# Patient Record
Sex: FEMALE | Race: WHITE
[De-identification: names, ages, dates, MRNs, and addresses within clinical notes are randomized per-mention and may not be internally consistent; named-entity substitution may affect disease eponyms.]

---

## 2017-01-05 ENCOUNTER — RX ONLY (OUTPATIENT)
Age: 62
Setting detail: RX ONLY
End: 2017-01-05

## 2017-02-04 ENCOUNTER — OFFICE VISIT (OUTPATIENT)
Dept: URGENT CARE | Facility: CLINIC | Age: 62
End: 2017-02-04
Payer: COMMERCIAL

## 2017-02-04 VITALS
SYSTOLIC BLOOD PRESSURE: 130 MMHG | BODY MASS INDEX: 23.8 KG/M2 | OXYGEN SATURATION: 97 % | TEMPERATURE: 98.5 F | HEART RATE: 87 BPM | WEIGHT: 143 LBS | DIASTOLIC BLOOD PRESSURE: 84 MMHG

## 2017-02-04 DIAGNOSIS — R21 SKIN ERUPTION: ICD-10-CM

## 2017-02-04 PROCEDURE — 99202 OFFICE O/P NEW SF 15 MIN: CPT | Performed by: FAMILY MEDICINE

## 2017-02-04 RX ORDER — PREDNISONE 10 MG/1
30 TABLET ORAL EVERY MORNING
Qty: 21 TAB | Refills: 0 | Status: SHIPPED | OUTPATIENT
Start: 2017-02-04 | End: 2017-02-11

## 2017-02-04 RX ORDER — VALACYCLOVIR HYDROCHLORIDE 1 G/1
1000 TABLET, FILM COATED ORAL 3 TIMES DAILY
Qty: 21 TAB | Refills: 0 | Status: SHIPPED | OUTPATIENT
Start: 2017-02-04 | End: 2017-02-11

## 2017-02-04 RX ORDER — PRAMOXINE HYDROCHLORIDE 10 MG/ML
LOTION TOPICAL
Qty: 1 BOTTLE | Refills: 0 | Status: SHIPPED | OUTPATIENT
Start: 2017-02-04 | End: 2022-12-20

## 2017-02-04 RX ORDER — HYDROXYZINE PAMOATE 25 MG/1
CAPSULE ORAL
Qty: 20 CAP | Refills: 1 | Status: SHIPPED | OUTPATIENT
Start: 2017-02-04 | End: 2020-01-11

## 2017-02-04 ASSESSMENT — ENCOUNTER SYMPTOMS
FEVER: 0
SHORTNESS OF BREATH: 0
FOCAL WEAKNESS: 0
DIZZINESS: 0
ORTHOPNEA: 0
CHILLS: 0
HEMOPTYSIS: 0

## 2017-02-04 NOTE — MR AVS SNAPSHOT
Adrienne Morales   2017 4:00 PM   Office Visit   MRN: [id number]    Department:  [facility]   Dept Phone:  [phone]    Description:  Female : 1955   Provider:  Wild Mullen M.D.           Reason for Visit     Rash rash on torso and legs,  itchy x 2 days      Allergies as of 2017     Allergen Noted Reactions    Nkda [No Known Drug Allergy] 10/21/2011         You were diagnosed with     Skin eruption   [[id number]]         Vital Signs     Blood Pressure Pulse Temperature Weight Oxygen Saturation Smoking Status    130/84 mmHg 87 36.9 °C (98.5 °F) 64.864 kg (143 lb) 97% Never Smoker       Basic Information     Date Of Birth Sex Race Ethnicity Preferred Language    1955 Female White Non- English      Problem List              ICD-10-CM Priority Class Noted - Resolved    Preventative health care (Chronic) Z00.00   10/21/2011 - Present    Insomnia G47.00   10/21/2011 - Present    History of endometrial cancer Z85.42   10/21/2011 - Present    History of migraine Z86.69   10/21/2011 - Present    Osteopenia (Chronic) M85.80   10/21/2011 - Present    Perforated tympanic membrane H72.90   2012 - Present    Family history of cardiovascular disease (Chronic) Z82.49   2013 - Present    Dyslipidemia (Chronic) E78.5   2014 - Present      Health Maintenance        Date Due Completion Dates    IMM ZOSTER VACCINE 3/28/2015 ---    IMM INFLUENZA (1) 2016 ---    MAMMOGRAM 2017, 2015, 6/3/2013, 6/3/2013, 6/3/2013, 6/3/2013, 6/3/2013    COLONOSCOPY 2023    IMM DTaP/Tdap/Td Vaccine (2 - Td) 2024            Current Immunizations     Tdap Vaccine 2014      Below and/or attached are the medications your provider expects you to take. Review all of your home medications and newly ordered medications with your provider and/or pharmacist. Follow medication instructions as directed by your provider and/or pharmacist. Please keep  your medication list with you and share with your provider. Update the information when medications are discontinued, doses are changed, or new medications (including over-the-counter products) are added; and carry medication information at all times in the event of emergency situations     Allergies:  NKDA - (reactions not documented)               Medications  Valid as of: February 04, 2017 -  4:37 PM    Generic Name Brand Name Tablet Size Instructions for use    HydrOXYzine Pamoate (Cap) VISTARIL 25 MG 1-2 tabs qhs for itching        Naproxen Sodium   Take  by mouth.        Pramoxine HCl (Lotion) Pramoxine HCl 1 % otc use as directed        PredniSONE (Tab) DELTASONE 10 MG Take 3 Tabs by mouth every morning for 7 days.        SUMAtriptan Succinate (Tab) IMITREX 100 MG Take 1 Tab by mouth Once PRN for Migraine (take one at onset of migraine, maximum 2 per day).        Triamcinolone Acetonide (Cream) KENALOG 0.1 % Apply 1 Application to affected area(s) 2 times a day as needed.        ValACYclovir HCl (Tab) VALTREX 1 GM Take 1 Tab by mouth 3 times a day for 7 days.        .                 Medicines prescribed today were sent to:     [facility] [address] [place] [address], [place] - [address]    [address] [facility] [place] [address]    Phone: [phone] Fax: [phone]    Open 24 Hours?: No      Medication refill instructions:       If your prescription bottle indicates you have medication refills left, it is not necessary to call your provider’s office. Please contact your pharmacy and they will refill your medication.    If your prescription bottle indicates you do not have any refills left, you may request refills at any time through one of the following ways: The online Light[employer] system (except Urgent Care), by calling your provider’s office, or by asking your pharmacy to contact your provider’s office with a refill request. Medication refills are processed only during regular business hours and may not be  available until the next business day. Your provider may request additional information or to have a follow-up visit with you prior to refilling your medication.   *Please Note: Medication refills are assigned a new Rx number when refilled electronically. Your pharmacy may indicate that no refills were authorized even though a new prescription for the same medication is available at the pharmacy. Please request the medicine by name with the pharmacy before contacting your provider for a refill.        Other Notes About Your Plan     10/11/16  right hip, right knee burning sensation, if persists consider MRI lumbar spine, and conduction study right lower extremity                      MyChart Status: Patient Declined

## 2017-02-04 NOTE — PROGRESS NOTES
Subjective:      Adrienne Morales is a 61 y.o. female who presents with Rash    Chief Complaint   Patient presents with   • Rash     rash on torso and legs,  itchy x 2 days        - itchy rash started last night all over back trunk upper legs. Nothing new she can think of. She is very worried she has shingles and wants valtrex.           Rash  Pertinent negatives include no fever or shortness of breath.       Review of Systems   Constitutional: Negative for fever and chills.   Respiratory: Negative for hemoptysis and shortness of breath.    Cardiovascular: Negative for chest pain and orthopnea.   Skin: Positive for rash.   Neurological: Negative for dizziness and focal weakness.          Objective:     /84 mmHg  Pulse 87  Temp(Src) 36.9 °C (98.5 °F)  Wt 64.864 kg (143 lb)  SpO2 97%     Physical Exam   Constitutional: She appears well-developed. No distress.   HENT:   Head: Normocephalic and atraumatic.   Cardiovascular: Regular rhythm.    No murmur heard.  Neurological: She is alert.   Skin: Skin is warm and dry.   Psychiatric: She has a normal mood and affect. Judgment normal.   Nursing note and vitals reviewed.  skin seen: unremarkable except for some excoriation marks             Assessment/Plan:         1. Skin eruption  valacyclovir (VALTREX) 1 GM Tab    hydrOXYzine (VISTARIL) 25 MG Cap    predniSONE (DELTASONE) 10 MG Tab    Pramoxine HCl 1 % Lotion             Dx & d/c instructions discussed w/ patient and/or family members. Follow up w/ Prvt Dr or here in 3-4 days if not getting better, sooner if needed,  ER if worse and UC/PCP unavailable.        Possible side effects (i.e. Rash, GI upset/constipation, sedation, elevation of BP or sugars) of any medications given discussed.

## 2017-07-06 ENCOUNTER — RX ONLY (OUTPATIENT)
Age: 62
Setting detail: RX ONLY
End: 2017-07-06

## 2017-09-29 ENCOUNTER — TELEPHONE (OUTPATIENT)
Dept: MEDICAL GROUP | Facility: MEDICAL CENTER | Age: 62
End: 2017-09-29

## 2017-09-30 NOTE — TELEPHONE ENCOUNTER
Please notify patient that her mammogram at [facility] is negative, repeat one year

## 2017-10-02 ENCOUNTER — TELEPHONE (OUTPATIENT)
Dept: MEDICAL GROUP | Facility: MEDICAL CENTER | Age: 62
End: 2017-10-02

## 2017-10-02 NOTE — TELEPHONE ENCOUNTER
----- Message from Feliciano Olson M.D. sent at 9/29/2017  6:31 PM PDT -----  Please notify patient that her mammogram at [facility] is negative, repeat one year

## 2017-10-05 ENCOUNTER — TELEPHONE (OUTPATIENT)
Dept: MEDICAL GROUP | Facility: MEDICAL CENTER | Age: 62
End: 2017-10-05

## 2017-10-05 DIAGNOSIS — Z00.00 PREVENTATIVE HEALTH CARE: ICD-10-CM

## 2017-10-05 NOTE — TELEPHONE ENCOUNTER
1. Caller Name: Pt                      Call Back Number: [phone] (home)     2. Message: Pt would like lab orders placed for her annual visit. She would like to have Thyroid and Vitamin D included. Please contact pt when ready to      3. Patient approves office to leave a detailed voicemail/MyChart message: yes

## 2017-10-24 ENCOUNTER — TELEPHONE (OUTPATIENT)
Dept: MEDICAL GROUP | Facility: MEDICAL CENTER | Age: 62
End: 2017-10-24

## 2017-10-24 ENCOUNTER — OFFICE VISIT (OUTPATIENT)
Dept: MEDICAL GROUP | Facility: MEDICAL CENTER | Age: 62
End: 2017-10-24
Payer: COMMERCIAL

## 2017-10-24 VITALS
BODY MASS INDEX: 25.16 KG/M2 | TEMPERATURE: 98.3 F | HEIGHT: 65 IN | HEART RATE: 89 BPM | WEIGHT: 151 LBS | DIASTOLIC BLOOD PRESSURE: 64 MMHG | SYSTOLIC BLOOD PRESSURE: 90 MMHG | OXYGEN SATURATION: 97 %

## 2017-10-24 DIAGNOSIS — Z00.00 PREVENTATIVE HEALTH CARE: Chronic | ICD-10-CM

## 2017-10-24 DIAGNOSIS — Z86.69 HISTORY OF MIGRAINE: ICD-10-CM

## 2017-10-24 PROCEDURE — 99396 PREV VISIT EST AGE 40-64: CPT | Performed by: INTERNAL MEDICINE

## 2017-10-24 RX ORDER — SUMATRIPTAN 100 MG/1
100 TABLET, FILM COATED ORAL
Qty: 12 TAB | Refills: 6 | Status: SHIPPED | OUTPATIENT
Start: 2017-10-24 | End: 2018-10-23 | Stop reason: SDUPTHER

## 2017-10-24 ASSESSMENT — ENCOUNTER SYMPTOMS
DEPRESSION: 0
SHORTNESS OF BREATH: 0
INSOMNIA: 0
WEIGHT LOSS: 0
DIARRHEA: 0
COUGH: 0
HEARTBURN: 0
BACK PAIN: 0
DIZZINESS: 0
PALPITATIONS: 0
ABDOMINAL PAIN: 0
BLURRED VISION: 0
NERVOUS/ANXIOUS: 0
DOUBLE VISION: 0

## 2017-10-24 ASSESSMENT — PATIENT HEALTH QUESTIONNAIRE - PHQ9: CLINICAL INTERPRETATION OF PHQ2 SCORE: 0

## 2017-10-24 NOTE — PROGRESS NOTES
Subjective:      Adrienne Morales is a 62 y.o. female who presents with Annual Exam            HPI  Annual exam  Sees eye doctor in [place] this year, contact lenses   No hearing changes  Teeth cleaning twice per year  Sees gyn  2017  meds and allergies reviewed  Has had more migraine headaches usually more in the morning relieved with imitrex and has not had any aura, no emesis, no photophobia, no precipitating factor, will have 6-10 migraine flare ups per month over the last few months, has tried multiple medications previously without benefit for prophylaxis, Imitrex is effective for relieving acute migraine headaches, occasionally she will take Naprosyn as well  FH mother passed away in [place] atrial fibrillation fall and brain bleed and father passed away as well This year  SH , no tobacco, occasional etoh, tries to keep active and walks [phone] steps per days   Denies depression or anxiety   Occasional insomnia      Current Outpatient Prescriptions   Medication Sig Dispense Refill   • Naproxen Sodium (ALEVE PO) Take  by mouth.     • hydrOXYzine (VISTARIL) 25 MG Cap 1-2 tabs qhs for itching 20 Cap 1   • Pramoxine HCl 1 % Lotion otc use as directed 1 Bottle 0   • sumatriptan (IMITREX) 100 MG tablet Take 1 Tab by mouth Once PRN for Migraine (take one at onset of migraine, maximum 2 per day). 9 Tab 5   • triamcinolone acetonide (KENALOG) 0.1 % Cream Apply 1 Application to affected area(s) 2 times a day as needed. 45 g 2     No current facility-administered medications for this visit.      Patient Active Problem List    Diagnosis Date Noted   • Dyslipidemia 09/16/2014   • Family history of cardiovascular disease 11/18/2013   • Perforated tympanic membrane 12/06/2012   • Preventative health care 10/21/2011   • Insomnia 10/21/2011   • History of endometrial cancer 10/21/2011   • History of migraine 10/21/2011   • Osteopenia 10/21/2011     Depression Screening    Little interest or pleasure in doing  things?  0 - not at all  Feeling down, depressed , or hopeless? 0 - not at all  Patient Health Questionnaire Score: 0         Preventative health  12/13/13 colon per [facility] diverticulosis repeat 10 years  9/12/14 tdap  3/15  gyn   10/6/15 dexa LS 0.0,hip -1.8 ;FRAX 9% major,1.0% hip  10/11/16 declines shingles and influenza vaccine  10/19/16 vit d 35 on 2000 units done at [facility]  10/26/16 FIT at [facility] negative  9/29/17 mammogram at [facility] negative repeat one year     Perforated tympanic membrane     Osteopenia  12/10/12 dexa LS -0.9, hip -1.2 per gyn  9/16/14 vit d 28 start 2000 units  10/6/15 dexa LS 0.0,hip -1.8 ;FRAX 9% major,1.0% hip  10/14/15 vit d 32   7/22/16 start 2000 units   10/19/16 vit d 35 on 2000 units done at [facility]     Insomnia uses Ambien for travel     History migraine tried Inderal previously but caused low blood pressure     History endometrial cancer  3/10 [facility] BSO for stage I endometrial cancer   2013 gyn exam  sees twice per year  9/14 sees  gyn     Family history cardiovascular disease  Father CABG and sister heart disease  11/20/13 echo normal LV 60%  12/10/13 CT calcium score 0     Dyslipidemia  11/20/13 echo normal LV 60%  11/14/13 chol 191,trig 113,hdl 61,ldl 107  12/10/13 CT calcium score 0  9/16/14 chol 206,trig 91,hdl 74,ldl 114  10/14/15 chol 215,trig 83,hdl 81,ldl 117  10/19/16 chol 189,trig 127,hdl 61,ldl 103 done at [facility]    Depression Screening    Little interest or pleasure in doing things?  0 - not at all  Feeling down, depressed , or hopeless? 0 - not at all  Patient Health Questionnaire Score: 0       Review of Systems   Constitutional: Negative for weight loss.   Eyes: Negative for blurred vision and double vision.   Respiratory: Negative for cough and shortness of breath.    Cardiovascular: Negative for chest pain and palpitations.   Gastrointestinal: Negative for abdominal pain, diarrhea and heartburn.   Genitourinary: Negative for dysuria.   Musculoskeletal:  "Negative for back pain and joint pain.   Skin: Negative for rash.   Neurological: Negative for dizziness.   Endo/Heme/Allergies: Negative for environmental allergies.   Psychiatric/Behavioral: Negative for depression. The patient is not nervous/anxious and does not have insomnia.           Objective:     BP (!) 90/64   Pulse 89   Temp 36.8 °C (98.3 °F)   Ht 1.651 m (5' 5\")   Wt 68.5 kg (151 lb)   SpO2 97%   BMI 25.13 kg/m²      Physical Exam   Constitutional: She appears well-developed and well-nourished. No distress.   HENT:   Head: Normocephalic and atraumatic.   Eyes: Conjunctivae are normal. Right eye exhibits no discharge. Left eye exhibits no discharge.   Neck: No JVD present. No thyromegaly present.   Cardiovascular: Normal rate and regular rhythm.    No murmur heard.  Pulmonary/Chest: Effort normal. No respiratory distress. She has no wheezes.   Abdominal: She exhibits no distension. There is no tenderness.   Musculoskeletal: She exhibits no edema.   Neurological: She is alert.   Skin: Skin is warm. She is not diaphoretic.   Psychiatric: She has a normal mood and affect. Her behavior is normal.   Nursing note and vitals reviewed.              Assessment/Plan:     Assessment  #! Annual    #2 Osteopenia 10/6/15 dexa LS 0.0,hip -1.8 ;FRAX 9% major,1.0% hip    #3 History migraine tried Inderal previously but caused low blood pressure, more recent migraine flareups 6-10 per month question related to stress from parents passing away from having to handle details financially, denies depression, good social support with      #4 History endometrial cancer 3/10 [facility] BSO for stage I endometrial cancer followed by gynecology , no recurrence, saw gynecology earlier this year, no records      #5 Dyslipidemia diet-controlled no medication 10/19/16 chol 189,trig 127,hdl 61,ldl 103 done at [facility]    #6 Preventative health  12/13/13 colon per [facility] diverticulosis repeat 10 years  9/12/14 tdap  3/15  " gyn   10/6/15 dexa LS 0.0,hip -1.8 ;FRAX 9% major,1.0% hip  10/11/16 declines shingles and influenza vaccine  10/19/16 vit d 35 on 2000 units done at [facility]  10/26/16 FIT at [facility] negative  9/29/17 mammogram at [facility] negative repeat one year        Plan  #! dexa will get at [facility]     #2 had labs at [facility], will call for those results And notify her    #3 follow up with dentist for migraines, declines meds for prophylaxis and declines botox , recommend consider acupuncture, massage therapy for migraines, she will consider, possible stress component from parents passing away this year, no depression    #4 had mammogram earlier this year, repeat next year, follow-up gynecology    #5 next colonoscopy 2023    #6 recommend flu vaccine and shingles vaccine at pharmacy    #7 lifestyle modification discussed    #8 declines psychotherapy    #9 nutrition, exercise discussed, minimize alcohol, no tobacco    #10 follow-up one year, sooner if migraines do not improve

## 2017-10-25 NOTE — TELEPHONE ENCOUNTER
I called patient and left a message, please notify her that we received her blood test results from [facility]  (1) Her cholesterol is 205, good cholesterol or HDL is 66 (goal is above 40), her bad cholesterol is 121 (goal is less than 100), her cholesterol did increase slightly from last year, no medications are necessary, have her continue to work on her exercise and nutrition program  (2) her blood sugar is normal at 98, her 3 month average blood sugar percent is normal at 5.4%  (3) her liver function, kidney function, thyroid tests are normal  (4) her vitamin D level is normal at 37  (5) we can repeat her blood test in one year; notify me if she has any questions

## 2017-10-25 NOTE — TELEPHONE ENCOUNTER
Tried calling pt, unable to leave a message.     Records will be faxed to [phone] within 5-10 business days.

## 2017-11-16 ENCOUNTER — TELEPHONE (OUTPATIENT)
Dept: MEDICAL GROUP | Facility: MEDICAL CENTER | Age: 62
End: 2017-11-16

## 2017-11-17 NOTE — TELEPHONE ENCOUNTER
Please notify patient that her bone density test done at [facility] shows:  (1) normal bone strength of her spine  (2) slightly decreased bone strength of her hip, not osteoporosis, but slightly decreased from 2015  (3) no medications are necessary  (4) continue vitamin D supplementation 2000 units daily, continue regular weightbearing exercise  (5) we will repeat her bone density test in 2 years

## 2017-12-15 ENCOUNTER — TELEPHONE (OUTPATIENT)
Dept: MEDICAL GROUP | Facility: MEDICAL CENTER | Age: 62
End: 2017-12-15

## 2017-12-15 NOTE — TELEPHONE ENCOUNTER
1. Caller Name: Adrienne Morales                                           Call Back Number: [phone] (home)         Patient approves a detailed voicemail message: no    Pt needs her bone scan and labs recoded. Will be dropping off copies so that we may address it with the lab and billing.

## 2017-12-16 NOTE — PROGRESS NOTES
Please notify the patient that all the labs were ordered as preventative, we will have to check with billing or [facility].    The bone density was done at [facility], I can try a different code. Please try using both of the following codes for the bone density test.  Z00.00   Z13.820   We will have to call [facility] to see if those codes are sufficient to cover the test

## 2017-12-18 NOTE — PROGRESS NOTES
Left message for pt to call back.    Per Marni at [facility], this test was applied to her deductible. The pt will need to contact her insurance to see if a screening bone density test is a covered benefit. If it is, we can fax a corrected order to [facility] with updated screening codes.

## 2017-12-21 NOTE — TELEPHONE ENCOUNTER
The order for the initial DEXA at [facility] was printed and I use another code for a different preventative diagnosis.  Please check with [facility] to see if this would be sufficient to change the coverage    The lab tests were all coded as preventative.  I do not need to change anything for that, we would have to check with [facility] as far as coverage is concerned

## 2017-12-21 NOTE — TELEPHONE ENCOUNTER
Please see orders only encounter dated 11/10/17.     Pt notified that Bone Density test was not coded as screening. She would like codes changed and submitted to [facility]. She is also requesting update on [facility] labs coding. She states she dropped off paperwork on 12/15/17.

## 2017-12-22 NOTE — TELEPHONE ENCOUNTER
Marni at [facility] called back to notify they resubmitted after receiving updated order. Reports this will take up to 30 days for [employer] [place] to process. [phone] ext: [phone] if we need anything else. Act on hold until this processes.     Pt notified of this process.

## 2017-12-22 NOTE — TELEPHONE ENCOUNTER
Spoke with Elda re: rebilling her labs. Meeta sent me to billing where I had to leave a VM. The message said someone would contact us without 24 hrs. Veronica took the paperwork re: billing issues for this.

## 2017-12-22 NOTE — TELEPHONE ENCOUNTER
New order with screening codes for DEXA scan was faxed by Bea yesterday. I left a message for [facility] to call back and let us know they received this.     I spoke with [facility] and they are resubmitting the CMP, CBC and TSH because they were partially covered. The Vit D was also partially covered, but [employer] [place] generally does not consider this test preventative. Her co-pay for this test will be 48 dollars. It will take up to 45 days for them to reprocess.     [facility] message sent to pt with update.

## 2018-02-22 ENCOUNTER — HOSPITAL ENCOUNTER (OUTPATIENT)
Dept: RADIOLOGY | Facility: MEDICAL CENTER | Age: 63
End: 2018-02-22

## 2018-10-10 ENCOUNTER — TELEPHONE (OUTPATIENT)
Dept: MEDICAL GROUP | Facility: MEDICAL CENTER | Age: 63
End: 2018-10-10

## 2018-11-21 ENCOUNTER — TELEPHONE (OUTPATIENT)
Dept: MEDICAL GROUP | Facility: MEDICAL CENTER | Age: 63
End: 2018-11-21

## 2018-11-21 DIAGNOSIS — Z00.00 PREVENTATIVE HEALTH CARE: Chronic | ICD-10-CM

## 2018-11-21 NOTE — TELEPHONE ENCOUNTER
Regarding: Non-Urgent Medical Question  Contact: [phone]  ----- Message from Osmin George Ass't sent at 11/20/2018 12:50 PM PST -----       ----- Message from Rachel Morales to Feliciano Olson M.D. sent at 11/20/2018 11:54 AM -----   I have a physical scheduled for 12/11.  I would like to get my blood work done  ahead of the appointment.  Preferably next week, we use [facility].  I stopped by the office yesterday, 11/19, and they said they would mail it to me.  Could you please email it to me?  [email or web address]  Thanks!   Rachel Morales ([phone])

## 2018-11-28 LAB
25(OH)D3+25(OH)D2 SERPL-MCNC: 42.6 NG/ML (ref 30–100)
ALBUMIN SERPL-MCNC: 4.3 G/DL (ref 3.6–4.8)
ALBUMIN/GLOB SERPL: 1.7 {RATIO} (ref 1.2–2.2)
ALP SERPL-CCNC: 121 IU/L (ref 39–117)
ALT SERPL-CCNC: 50 IU/L (ref 0–32)
AST SERPL-CCNC: 42 IU/L (ref 0–40)
BASOPHILS # BLD AUTO: 0 X10E3/UL (ref 0–0.2)
BASOPHILS NFR BLD AUTO: 0 %
BILIRUB SERPL-MCNC: 0.3 MG/DL (ref 0–1.2)
BUN SERPL-MCNC: 14 MG/DL (ref 8–27)
BUN/CREAT SERPL: 22 (ref 12–28)
CALCIUM SERPL-MCNC: 9.6 MG/DL (ref 8.7–10.3)
CHLORIDE SERPL-SCNC: 103 MMOL/L (ref 96–106)
CHOLEST SERPL-MCNC: 204 MG/DL (ref 100–199)
CO2 SERPL-SCNC: 26 MMOL/L (ref 20–29)
CREAT SERPL-MCNC: 0.65 MG/DL (ref 0.57–1)
EOSINOPHIL # BLD AUTO: 0.1 X10E3/UL (ref 0–0.4)
EOSINOPHIL NFR BLD AUTO: 1 %
ERYTHROCYTE [DISTWIDTH] IN BLOOD BY AUTOMATED COUNT: 13.7 % (ref 12.3–15.4)
GLOBULIN SER CALC-MCNC: 2.5 G/DL (ref 1.5–4.5)
GLUCOSE SERPL-MCNC: 92 MG/DL (ref 65–99)
HCT VFR BLD AUTO: 44.1 % (ref 34–46.6)
HDLC SERPL-MCNC: 62 MG/DL
HGB BLD-MCNC: 14.7 G/DL (ref 11.1–15.9)
IF AFRICAN AMERICAN  100797: 109 ML/MIN/1.73
IF NON AFRICAN AMER 100791: 95 ML/MIN/1.73
IMM GRANULOCYTES # BLD: 0 X10E3/UL (ref 0–0.1)
IMM GRANULOCYTES NFR BLD: 0 %
IMMATURE CELLS  115398: NORMAL
LABORATORY COMMENT REPORT: ABNORMAL
LDLC SERPL CALC-MCNC: 118 MG/DL (ref 0–99)
LYMPHOCYTES # BLD AUTO: 1.5 X10E3/UL (ref 0.7–3.1)
LYMPHOCYTES NFR BLD AUTO: 21 %
MCH RBC QN AUTO: 30.1 PG (ref 26.6–33)
MCHC RBC AUTO-ENTMCNC: 33.3 G/DL (ref 31.5–35.7)
MCV RBC AUTO: 90 FL (ref 79–97)
MONOCYTES # BLD AUTO: 0.7 X10E3/UL (ref 0.1–0.9)
MONOCYTES NFR BLD AUTO: 10 %
MORPHOLOGY BLD-IMP: NORMAL
NEUTROPHILS # BLD AUTO: 4.7 X10E3/UL (ref 1.4–7)
NEUTROPHILS NFR BLD AUTO: 68 %
NRBC BLD AUTO-RTO: NORMAL %
PLATELET # BLD AUTO: 247 X10E3/UL (ref 150–379)
POTASSIUM SERPL-SCNC: 4.6 MMOL/L (ref 3.5–5.2)
PROT SERPL-MCNC: 6.8 G/DL (ref 6–8.5)
RBC # BLD AUTO: 4.89 X10E6/UL (ref 3.77–5.28)
SODIUM SERPL-SCNC: 143 MMOL/L (ref 134–144)
TRIGL SERPL-MCNC: 121 MG/DL (ref 0–149)
TSH SERPL DL<=0.005 MIU/L-ACNC: 1.88 UIU/ML (ref 0.45–4.5)
VLDLC SERPL CALC-MCNC: 24 MG/DL (ref 5–40)
WBC # BLD AUTO: 6.9 X10E3/UL (ref 3.4–10.8)

## 2018-12-10 ENCOUNTER — TELEPHONE (OUTPATIENT)
Dept: MEDICAL GROUP | Facility: MEDICAL CENTER | Age: 63
End: 2018-12-10

## 2018-12-10 NOTE — TELEPHONE ENCOUNTER
ESTABLISHED PATIENT PRE-VISIT PLANNING     Patient was NOT contacted to complete PVP.     Note: Patient will not be contacted if there is no indication to call.     1.  Reviewed notes from the last few office visits within the medical group: Yes 10/11/16, 10/24/17    2.  If any orders were placed at last visit or intended to be done for this visit (i.e. 6 mos follow-up), do we have Results/Consult Notes?        •  Labs - Labs were not ordered at last office visit.   Note: If patient appointment is for lab review and patient did not complete labs, check with provider if OK to reschedule patient until labs completed.       •  Imaging - Imaging was not ordered at last office visit.       •  Referrals - No referrals were ordered at last office visit.    3. Is this appointment scheduled as a Hospital Follow-Up? No    4.  Immunizations were updated in Epic using [employer]?: Yes       •  [employer] Recommendations: TD and ZOSTAVAX (Shingles)    5.  Patient is due for the following Health Maintenance Topics:   Health Maintenance Due   Topic Date Due   • IMM ZOSTER VACCINES (1 of 2) 03/28/2005     6.  MDX printed for Provider? NO    7.  Patient was NOT informed to arrive 15 min prior to their scheduled appointment and bring in their medication bottles.

## 2018-12-11 ENCOUNTER — OFFICE VISIT (OUTPATIENT)
Dept: MEDICAL GROUP | Facility: MEDICAL CENTER | Age: 63
End: 2018-12-11
Payer: COMMERCIAL

## 2018-12-11 VITALS
BODY MASS INDEX: 25.16 KG/M2 | TEMPERATURE: 97.9 F | HEART RATE: 102 BPM | SYSTOLIC BLOOD PRESSURE: 124 MMHG | DIASTOLIC BLOOD PRESSURE: 72 MMHG | WEIGHT: 151 LBS | OXYGEN SATURATION: 98 % | HEIGHT: 65 IN

## 2018-12-11 DIAGNOSIS — Z85.42 HISTORY OF ENDOMETRIAL CANCER: ICD-10-CM

## 2018-12-11 DIAGNOSIS — M85.80 OSTEOPENIA, UNSPECIFIED LOCATION: Chronic | ICD-10-CM

## 2018-12-11 DIAGNOSIS — E78.5 DYSLIPIDEMIA: Chronic | ICD-10-CM

## 2018-12-11 DIAGNOSIS — R94.5 ABNORMAL LIVER FUNCTION: ICD-10-CM

## 2018-12-11 DIAGNOSIS — H53.9 VISION CHANGES: ICD-10-CM

## 2018-12-11 DIAGNOSIS — Z86.69 HISTORY OF MIGRAINE: ICD-10-CM

## 2018-12-11 DIAGNOSIS — G47.00 INSOMNIA, UNSPECIFIED TYPE: ICD-10-CM

## 2018-12-11 DIAGNOSIS — Z00.00 PREVENTATIVE HEALTH CARE: Chronic | ICD-10-CM

## 2018-12-11 PROCEDURE — 99396 PREV VISIT EST AGE 40-64: CPT | Performed by: INTERNAL MEDICINE

## 2018-12-11 RX ORDER — CHOLECALCIFEROL (VITAMIN D3) 50 MCG
TABLET ORAL DAILY
COMMUNITY

## 2018-12-11 RX ORDER — ZOLPIDEM TARTRATE 10 MG/1
10 TABLET ORAL NIGHTLY PRN
Qty: 30 TAB | Refills: 0 | Status: SHIPPED
Start: 2018-12-11 | End: 2019-01-10

## 2018-12-11 ASSESSMENT — PATIENT HEALTH QUESTIONNAIRE - PHQ9: CLINICAL INTERPRETATION OF PHQ2 SCORE: 0

## 2018-12-11 NOTE — PROGRESS NOTES
Subjective:      Adrienne Morales is a 63 y.o. female who presents with Annual Exam            HPI       Annual exam  meds and allergies reviewed and updated, medical history, surgical history, social history, family history reviewed and updated  On imitrex uses as needed  Sees gyn annually    Sees eye doctor in [place] needs a new ophthalmologist in [facility] has seen Dr. Roy previously, family history of eye disease, occasional vision changes.  Uses ambien for travel and no side effects, no drowsiness or hangover effect next day, no mood changes or anxiety or depression  SH , exercises by walking in the summer, no tobacco, rare etoh, tries to eat clean  Recent labs reviewed  9/15/14 AST 22,ALT 35,alk phos 118,bili 0.3  11/27/18 AST 42,ALT 50,alk phos 121,bili 0.3  11/27/18 chol 204,trig 121,hdl 62,ldl 121  11/27/18 vit d 42        Current Outpatient Prescriptions   Medication Sig Dispense Refill   • Cholecalciferol (VITAMIN D) 2000 UNIT Tab Take  by mouth every day.     • sumatriptan (IMITREX) 100 MG tablet TAKE ONE TABLET BY MOUTH ONCE AT ONSET OF MIGRAINE AS NEEDED. MAXIMUM 2 TABLETS DAILY 9 Tab 9   • Naproxen Sodium (ALEVE PO) Take  by mouth.     • hydrOXYzine (VISTARIL) 25 MG Cap 1-2 tabs qhs for itching (Patient not taking: Reported on 12/11/2018) 20 Cap 1   • Pramoxine HCl 1 % Lotion otc use as directed 1 Bottle 0     No current facility-administered medications for this visit.      Preventative health  12/13/13 colon per [facility] diverticulosis repeat 10 years  9/12/14 tdap  10/26/16 FIT at [facility] negative  3/7/17  gyn pap negative  10/20/17 vit d 37 done at [facility]  10/24/17 declines shingles    11/10/17 dexa LS-0.5,hip-2.0;FRAX 10% major,1.5% hip done at [facility]  10/5/18 mammogram at [facility]     Perforated tympanic membrane     Osteopenia  12/10/12 dexa LS -0.9, hip -1.2 per gyn  9/16/14 vit d 28 start 2000 units  10/6/15 dexa LS 0.0,hip -1.8 ;FRAX 9% major,1.0% hip  10/14/15 vit d 32   7/22/16 start  2000 units   10/19/16 vit d 35 on [facility]  10/20/17 [facility] d 37 [facility]  11/10/17 dexa LS-0.5,hip-2.0;FRAX 10% major,1.5% hip done at [facility]     Insomnia uses Ambien for travel     History migraine tried Inderal previously but caused low blood pressure  Tried inderal in her 20s, but caused low bp  10/24/17 more migraine recently and declines medication      History endometrial cancer  3/10 JACKLYN BSO for stage I endometrial cancer   2013 gyn exam  sees twice per year  9/14 sees  gyn  3/7/17 pap per gyn      Family history cardiovascular disease  Father CABG and sister heart disease  11/20/13 echo normal LV 60%  12/10/13 CT calcium score 0     Dyslipidemia  11/20/13 echo normal LV 60%  11/14/13 chol 191,trig 113,hdl 61,ldl 107  12/10/13 CT calcium score 0  9/16/14 chol 206,trig 91,hdl 74,ldl 114  10/14/15 chol 215,trig 83,hdl 81,ldl 117  10/19/16 chol 189,trig 127,hdl 61,ldl 103 [facility]  10/24/17 chol 205,trig 83,hdl 66,ldl 121         Patient Active Problem List   Diagnosis   • Preventative health care   • Insomnia   • History of endometrial cancer   • History of migraine   • Osteopenia   • Perforated tympanic membrane   • Family history of cardiovascular disease   • Dyslipidemia     Depression Screening    Little interest or pleasure in doing things?  0 - not at all  Feeling down, depressed , or hopeless? 0 - not at all  Patient Health Questionnaire Score: 0        Health Maintenance Summary                IMM ZOSTER VACCINES Overdue 3/28/2005     MAMMOGRAM Next Due 10/5/2019      Done 10/5/2018 [id number]     Patient has more history with this topic...    COLONOSCOPY Next Due 12/13/2023      Done 12/13/2013 AMB REFERRAL TO GI FOR COLONOSCOPY    IMM DTaP/Tdap/Td Vaccine Next Due 9/12/2024      Done 9/12/2014 Imm Admin: Tdap Vaccine          Patient Care Team:  Feliciano Olson M.D. as PCP - General (Internal Medicine)      ROS       Objective:     /72 (BP  "Location: Right arm, Patient Position: Sitting, BP Cuff Size: Adult)   Pulse (!) 102   Temp 36.6 °C (97.9 °F)   Ht 1.651 m (5' 5\")   Wt 68.5 kg (151 lb)   SpO2 98%   BMI 25.13 kg/m²      Physical Exam   Constitutional: She appears well-developed and well-nourished. No distress.   HENT:   Head: Normocephalic and atraumatic.   Right Ear: External ear normal.   Left Ear: External ear normal.   Mouth/Throat: Oropharynx is clear and moist.   Eyes: Conjunctivae are normal. Right eye exhibits no discharge. Left eye exhibits no discharge.   Neck: Neck supple. No JVD present. No thyromegaly present.   Cardiovascular: Normal rate, regular rhythm and normal heart sounds.    Pulmonary/Chest: Effort normal and breath sounds normal. No respiratory distress. She has no wheezes.   Abdominal: She exhibits no distension.   Musculoskeletal: She exhibits no edema.   Neurological: She is alert.   Skin: Skin is warm. She is not diaphoretic.   Nursing note and vitals reviewed.              Assessment/Plan:     Assessment  #! Annual exam    #2 osteopenia    #3 history of migraine stable on Imitrex has declined preventative therapy    #4 History endometrial cancer status post JACKLYN/BSO 2010 followed by gynecology    #5 dyslipidemia diet-controlled 11/27/18 chol 204,trig 121,hdl 62,ldl 121    #6 abnormal liver enzymes 11/27/18 AST 42,ALT 50,alk phos 121,bili 0.3, no regular alcohol possible nonalcoholic steatosis    #7 preventative health  12/13/13 colon per [facility] diverticulosis repeat 10 years  9/12/14 tdap  10/26/16 [facility]  3/7/17  gyn pap negative  10/24/17 declines shingles    11/10/17 dexa LS-0.5,hip-2.0;FRAX 10% major,1.5% hip done at [facility]  10/5/18 mammogram at [facility]  12/11/18 declines shingrix vaccine  11/27/18 vit d 42    #8 vision changes, has been seen by ophthalmology in [place] annually     #9 insomnia with travel    Plan  #! shingrix declines at this time    #2 mammogram due next year    #3 dexa next year "     #4 has had flu vaccine    #5 labs    #6 limit alcohol, nutrition, diet, exercise discussed    #7 referral eye exam    #8 follow-up gynecology    #9 refill Ambien for travel, has had previously, can cause drowsiness, hangover effect, no alcohol with medication, long-term use may contribute to memory loss, depression, mood changes    #10

## 2019-03-19 PROBLEM — H02.839 DERMATOCHALASIS: Status: ACTIVE | Noted: 2019-03-19

## 2019-12-12 ENCOUNTER — OFFICE VISIT (OUTPATIENT)
Dept: MEDICAL GROUP | Facility: MEDICAL CENTER | Age: 64
End: 2019-12-12
Payer: COMMERCIAL

## 2019-12-12 ENCOUNTER — TELEPHONE (OUTPATIENT)
Dept: MEDICAL GROUP | Facility: MEDICAL CENTER | Age: 64
End: 2019-12-12

## 2019-12-12 VITALS
SYSTOLIC BLOOD PRESSURE: 126 MMHG | WEIGHT: 150 LBS | TEMPERATURE: 97.3 F | HEART RATE: 86 BPM | OXYGEN SATURATION: 97 % | HEIGHT: 65 IN | BODY MASS INDEX: 24.99 KG/M2 | DIASTOLIC BLOOD PRESSURE: 78 MMHG

## 2019-12-12 DIAGNOSIS — Z86.69 HISTORY OF MIGRAINE: ICD-10-CM

## 2019-12-12 DIAGNOSIS — Z12.11 COLON CANCER SCREENING: ICD-10-CM

## 2019-12-12 DIAGNOSIS — L65.9 HAIR THINNING: ICD-10-CM

## 2019-12-12 DIAGNOSIS — Z11.59 NEED FOR HEPATITIS C SCREENING TEST: ICD-10-CM

## 2019-12-12 DIAGNOSIS — Z00.00 PREVENTATIVE HEALTH CARE: Chronic | ICD-10-CM

## 2019-12-12 PROCEDURE — 99396 PREV VISIT EST AGE 40-64: CPT | Performed by: INTERNAL MEDICINE

## 2019-12-12 RX ORDER — SUMATRIPTAN 100 MG/1
100 TABLET, FILM COATED ORAL
Qty: 12 TAB | Refills: 6 | Status: SHIPPED | OUTPATIENT
Start: 2019-12-12 | End: 2020-11-02

## 2019-12-12 ASSESSMENT — PATIENT HEALTH QUESTIONNAIRE - PHQ9: CLINICAL INTERPRETATION OF PHQ2 SCORE: 0

## 2019-12-12 NOTE — PROGRESS NOTES
Subjective:      Adrienne Morales is a 64 y.o. female who presents with Annual Exam            HPI    Annual exam  Followed by gynecology, recent mammogram at [facility] we do not have those records  meds and allergies reviewed and updated, med and allergies reviewed and updated  Migraine headaches occurring multiple times 8-10 times per month, uses imitrex as needed   Not exercising on a regular basis had been walking until the weather got too cold.  Tries to eat a good nutritious diet, avoiding sweets and candies, no significant weight gain.      Current Outpatient Medications   Medication Sig Dispense Refill   • sumatriptan (IMITREX) 100 MG tablet Take 1 Tab by mouth Once PRN for Migraine for up to 1 dose. 9 Tab 6   • Cholecalciferol (VITAMIN D) 2000 UNIT Tab Take  by mouth every day.     • Naproxen Sodium (ALEVE PO) Take  by mouth.     • hydrOXYzine (VISTARIL) 25 MG Cap 1-2 tabs qhs for itching (Patient not taking: Reported on 12/11/2018) 20 Cap 1   • Pramoxine HCl 1 % Lotion otc use as directed 1 Bottle 0     No current facility-administered medications for this visit.             Preventative health  12/13/13 colon per [facility] diverticulosis repeat 10 years  9/12/14 tdap  10/26/16 FIT at [facility] negative  3/7/17  gyn pap negative  10/24/17 declines shingles    11/10/17 dexa LS-0.5,hip-2.0;FRAX 10% major,1.5% hip done at [facility]  10/5/18 mammogram at [facility]  12/11/18 declines shingrix vaccine  11/27/18 vit d 42     Perforated tympanic membrane     Osteopenia  12/10/12 dexa LS -0.9, hip -1.2 per gyn  9/16/14 vit d 28 start 2000 units  10/6/15 dexa LS 0.0,hip -1.8 ;FRAX 9% major,1.0% hip  10/14/15 vit d 32   7/22/16 start 2000 units   10/19/16 vit d 35 on 2000 units done at [facility]  10/20/17 vit d 37 done at [facility]  11/10/17 dexa LS-0.5,hip-2.0;FRAX 10% major,1.5% hip done at [facility]  11/27/18 vit d 42     Insomnia uses Ambien for travel     History migraine tried Inderal previously but caused low blood pressure  Tried inderal in her  20s, but caused low bp  10/24/17 more migraine recently and declines medication      History endometrial cancer  3/10 JACKLYN BSO for stage I endometrial cancer   2013 gyn exam  sees twice per year  9/14 sees  gyn  3/7/17 pap per gyn      Family history cardiovascular disease  Father CABG and sister heart disease  11/20/13 echo normal LV 60%  12/10/13 CT calcium score 0     Dyslipidemia  11/20/13 echo normal LV 60%  11/14/13 chol 191,trig 113,hdl 61,ldl 107  12/10/13 CT calcium score 0  9/16/14 chol 206,trig 91,hdl 74,ldl 114  10/14/15 chol 215,trig 83,hdl 81,ldl 117  10/19/16 chol 189,trig 127,hdl 61,ldl 103 [facility]  10/24/17 chol 205,trig 83,hdl 66,ldl 121  11/27/18 chol 204,trig 121,hdl 62,ldl 121    abn liver enzymes  9/15/14 AST 22,ALT 35,alk phos 118,bili 0.3  11/27/18 AST 42,ALT 50,alk phos 121,bili 0.3                  Patient Active Problem List   Diagnosis   • Preventative health care   • Insomnia   • History of endometrial cancer   • History of migraine   • Osteopenia   • Perforated tympanic membrane   • Family history of cardiovascular disease   • Dyslipidemia   • Abnormal liver function   • Dermatochalasis     Depression Screening    Little interest or pleasure in doing things?  0 - not at all  Feeling down, depressed , or hopeless? 0 - not at all  Patient Health Questionnaire Score: 0          Health Maintenance Summary                HEPATITIS C SCREENING Overdue 1955     IMM ZOSTER VACCINES Overdue 3/28/2005     IMM INFLUENZA Overdue 9/1/2019      Done 10/26/2018 Imm Admin: Influenza Vaccine Quad Inj (Pf)    MAMMOGRAM Overdue 10/5/2019      Done 10/5/2018 [id number]     Patient has more history with this topic...    COLONOSCOPY Next Due 12/13/2023      Done 12/13/2013 AMB REFERRAL TO GI FOR COLONOSCOPY    IMM DTaP/Tdap/Td Vaccine Next Due 9/12/2024      Done 9/12/2014 Imm Admin: Tdap Vaccine          Patient Care Team:  Feliciano Olson M.D. as PCP - General  "(Internal Medicine)      ROS  Occasional hair thinning     Objective:     /78 (BP Location: Right arm, Patient Position: Sitting, BP Cuff Size: Adult)   Pulse 86   Temp 36.3 °C (97.3 °F) (Temporal)   Ht 1.651 m (5' 5\")   Wt 68 kg (150 lb)   SpO2 97%   BMI 24.96 kg/m²      Physical Exam  Vitals signs and nursing note reviewed.   Constitutional:       Appearance: Normal appearance.   HENT:      Head: Normocephalic and atraumatic.      Right Ear: Tympanic membrane normal.      Left Ear: Tympanic membrane normal.      Nose: No congestion.      Mouth/Throat:      Pharynx: No oropharyngeal exudate.   Eyes:      General:         Right eye: No discharge.         Left eye: No discharge.   Cardiovascular:      Rate and Rhythm: Normal rate and regular rhythm.      Heart sounds: No murmur.   Pulmonary:      Effort: Pulmonary effort is normal. No respiratory distress.      Breath sounds: Normal breath sounds.   Abdominal:      General: Abdomen is flat.   Musculoskeletal:         General: No swelling.   Skin:     General: Skin is warm.   Neurological:      General: No focal deficit present.      Mental Status: She is alert.   Psychiatric:         Mood and Affect: Mood normal.         Thought Content: Thought content normal.                 Assessment/Plan:       Assessment  #1 annual exam    #2 migraine headaches using Imitrex 9-10 times per month    #3  Dyslipidemia diet controlled    #4 osteopenia no fragility fracture    #5 preventative health  12/13/13 colon per [facility] diverticulosis repeat 10 years  9/12/14 tdap  10/26/16 [facility]  3/7/17  gyn pap negative  11/10/17 dexa LS-0.5,hip-2.0;FRAX 10% major,1.5% hip done at [facility]  10/5/18 mammogram at [facility]  12/11/18 declines shingrix vaccine  11/27/18 vit d 42    #6 hair thinning      Plan  #! [facility] old mammogram records done in october or november     #2 labs    #3 dexa ordered for [facility]    #4 cologuard    #5 preferred home OPO question sleep apnea " contributing to headache    #6 biotin and B12 labs ordered this may not be preventative patient understands    #7 start exercising by walking on a regular basis, continue good nutritious diet    #8 declines influenza vaccine    #9 offered medication for migraine prophylaxis she declines    #10 follow-up gynecology    #11 follow-up 1 year

## 2019-12-17 ENCOUNTER — TELEPHONE (OUTPATIENT)
Dept: MEDICAL GROUP | Facility: MEDICAL CENTER | Age: 64
End: 2019-12-17

## 2019-12-17 NOTE — TELEPHONE ENCOUNTER
Left a message for patient to call back at [phone].     Barb Kline  [facility] Assistant

## 2019-12-17 NOTE — TELEPHONE ENCOUNTER
Please notify patient that her mammogram done at [facility] on October 31 was negative, she can repeat that in 1 year.  We apologize for the delay but I only received the results today.

## 2019-12-31 ENCOUNTER — TELEPHONE (OUTPATIENT)
Dept: MEDICAL GROUP | Facility: MEDICAL CENTER | Age: 64
End: 2019-12-31

## 2019-12-31 DIAGNOSIS — R79.89 ABNORMAL LIVER FUNCTION TESTS: ICD-10-CM

## 2019-12-31 PROBLEM — R73.09 IMPAIRED GLUCOSE METABOLISM: Status: ACTIVE | Noted: 2019-12-31

## 2019-12-31 LAB
ALBUMIN SERPL-MCNC: 4.3 G/DL (ref 3.6–4.8)
ALBUMIN/GLOB SERPL: 2 {RATIO} (ref 1.2–2.2)
ALP SERPL-CCNC: 122 IU/L (ref 39–117)
ALT SERPL-CCNC: 56 IU/L (ref 0–32)
APPEARANCE UR: NORMAL
AST SERPL-CCNC: 30 IU/L (ref 0–40)
BASOPHILS # BLD AUTO: 0 X10E3/UL (ref 0–0.2)
BASOPHILS NFR BLD AUTO: 0 %
BILIRUB SERPL-MCNC: 0.4 MG/DL (ref 0–1.2)
BILIRUB UR QL STRIP: NORMAL
BUN SERPL-MCNC: 13 MG/DL (ref 8–27)
BUN/CREAT SERPL: 21 (ref 12–28)
CALCIUM SERPL-MCNC: 9.2 MG/DL (ref 8.7–10.3)
CHLORIDE SERPL-SCNC: 101 MMOL/L (ref 96–106)
CHOLEST SERPL-MCNC: 204 MG/DL (ref 100–199)
CO2 SERPL-SCNC: 23 MMOL/L (ref 20–29)
COLOR UR: NORMAL
CREAT SERPL-MCNC: 0.63 MG/DL (ref 0.57–1)
EOSINOPHIL # BLD AUTO: 0.1 X10E3/UL (ref 0–0.4)
EOSINOPHIL NFR BLD AUTO: 2 %
ERYTHROCYTE [DISTWIDTH] IN BLOOD BY AUTOMATED COUNT: 13.4 % (ref 12.3–15.4)
GLOBULIN SER CALC-MCNC: 2.1 G/DL (ref 1.5–4.5)
GLUCOSE SERPL-MCNC: 96 MG/DL (ref 65–99)
GLUCOSE UR QL: NORMAL
HBA1C MFR BLD: 5.8 % (ref 4.8–5.6)
HCT VFR BLD AUTO: 43.1 % (ref 34–46.6)
HCV AB S/CO SERPL IA: <0.1 S/CO RATIO (ref 0–0.9)
HDLC SERPL-MCNC: 65 MG/DL
HGB BLD-MCNC: 14.4 G/DL (ref 11.1–15.9)
HGB UR QL STRIP: NORMAL
IMM GRANULOCYTES # BLD AUTO: 0 X10E3/UL (ref 0–0.1)
IMM GRANULOCYTES NFR BLD AUTO: 0 %
IMMATURE CELLS  115398: NORMAL
KETONES UR QL STRIP: NORMAL
LABORATORY COMMENT REPORT: ABNORMAL
LDLC SERPL CALC-MCNC: 113 MG/DL (ref 0–99)
LEUKOCYTE ESTERASE UR QL STRIP: NORMAL
LYMPHOCYTES # BLD AUTO: 2.1 X10E3/UL (ref 0.7–3.1)
LYMPHOCYTES NFR BLD AUTO: 37 %
MCH RBC QN AUTO: 30.6 PG (ref 26.6–33)
MCHC RBC AUTO-ENTMCNC: 33.4 G/DL (ref 31.5–35.7)
MCV RBC AUTO: 92 FL (ref 79–97)
MICRO URNS: NORMAL
MICRO URNS: NORMAL
MONOCYTES # BLD AUTO: 0.5 X10E3/UL (ref 0.1–0.9)
MONOCYTES NFR BLD AUTO: 10 %
MORPHOLOGY BLD-IMP: NORMAL
NEUTROPHILS # BLD AUTO: 2.8 X10E3/UL (ref 1.4–7)
NEUTROPHILS NFR BLD AUTO: 51 %
NITRITE UR QL STRIP: NORMAL
NRBC BLD AUTO-RTO: NORMAL %
PH UR STRIP: NORMAL [PH]
PLATELET # BLD AUTO: 232 X10E3/UL (ref 150–450)
POTASSIUM SERPL-SCNC: 4.2 MMOL/L (ref 3.5–5.2)
PROT SERPL-MCNC: 6.4 G/DL (ref 6–8.5)
PROT UR QL STRIP: NORMAL
RBC # BLD AUTO: 4.7 X10E6/UL (ref 3.77–5.28)
REQUEST PROBLEM   100552: NORMAL
SODIUM SERPL-SCNC: 140 MMOL/L (ref 134–144)
SP GR UR: NORMAL
TRIGL SERPL-MCNC: 130 MG/DL (ref 0–149)
TSH SERPL DL<=0.005 MIU/L-ACNC: 2.33 UIU/ML (ref 0.45–4.5)
URINALYSIS REFLEX  377202: NORMAL
UROBILINOGEN UR STRIP-MCNC: NORMAL MG/DL
VLDLC SERPL CALC-MCNC: 26 MG/DL (ref 5–40)
WBC # BLD AUTO: 5.5 X10E3/UL (ref 3.4–10.8)

## 2020-01-01 NOTE — TELEPHONE ENCOUNTER
Called patient and left message.  Please notify her that her test results shows:  (1) normal blood sugar, kidney function, thyroid function  (2) total cholesterol is 204, unchanged from a year ago and 5 years ago, good cholesterol is 65 (goal is above 40), bad cholesterol is 113 (goal is 100 or less), no medications are necessary  (3) her fasting blood sugar is normal, she does not have diabetes  (4) her liver enzymes are minimally increased, it is essentially unchanged from a year ago but since she has this persistent mild elevation of her liver enzymes I would like to run further tests to evaluate this.  (5) the nonfasting blood test orders are in the computer system   (6) I will also order a liver ultrasound to check for inflammation, she can call [phone] to schedule the ultrasound

## 2020-01-05 ENCOUNTER — TELEPHONE (OUTPATIENT)
Dept: MEDICAL GROUP | Facility: MEDICAL CENTER | Age: 65
End: 2020-01-05

## 2020-01-06 ENCOUNTER — TELEPHONE (OUTPATIENT)
Dept: MEDICAL GROUP | Facility: MEDICAL CENTER | Age: 65
End: 2020-01-06

## 2020-01-06 NOTE — TELEPHONE ENCOUNTER
Please notify the patient that we received her bone density test results done at [facility] on December 31.      Her bone density test shows no significant change, the bone strength of her spine is normal, she still has decreased bone strength of the hip but that has not changed compared to 2 years ago.    Have her continue regular weightbearing exercise, continue vitamin D supplementation and we can repeat her bone density test in 2 years.

## 2020-01-06 NOTE — TELEPHONE ENCOUNTER
----- Message from Pearl Cazares sent at 1/6/2020  3:29 PM PST -----  Regarding: Cologard  Patient has a family history of colon cancer, the test says if there is a history don't do test.  Any suggestions. Please reply via mychart to avoid playing phone tag.  Patient was given her bone density results

## 2020-01-07 NOTE — TELEPHONE ENCOUNTER
This is true only if she is having screening colonoscopies every 5 years but her gastroenterologist in 2013 indicated she needs to repeat her colonoscopy only every 10 years, thus she should still have a Cologuard done until her next colonoscopy in 2023

## 2020-01-08 ENCOUNTER — TELEPHONE (OUTPATIENT)
Dept: MEDICAL GROUP | Facility: MEDICAL CENTER | Age: 65
End: 2020-01-08

## 2020-01-08 PROBLEM — G47.30 SLEEP DISORDER BREATHING: Status: ACTIVE | Noted: 2020-01-08

## 2020-01-09 NOTE — TELEPHONE ENCOUNTER
[facility] on Monday, we still do not have those results, please call [facility] and have them fax the results to us.    I did notify her about her ultrasound result done at [facility] showing hepatic steatosis and her overnight pulse oximetry showing minimal desaturation, no further work-up for that necessary at this time.  That was done to see if sleep apnea could be contributing to her migraine headaches.

## 2020-01-11 ENCOUNTER — TELEPHONE (OUTPATIENT)
Dept: MEDICAL GROUP | Facility: MEDICAL CENTER | Age: 65
End: 2020-01-11

## 2020-01-11 DIAGNOSIS — R74.8 ABNORMAL ALKALINE PHOSPHATASE TEST: ICD-10-CM

## 2020-01-11 LAB
25(OH)D3+25(OH)D2 SERPL-MCNC: 43.4 NG/ML (ref 30–100)
ACTIN IGG SERPL-ACNC: 6 UNITS (ref 0–19)
ALBUMIN SERPL ELPH-MCNC: 3.7 G/DL (ref 2.9–4.4)
ALBUMIN/GLOB SERPL: 1.3 {RATIO} (ref 0.7–1.7)
ALP SERPL-CCNC: 137 IU/L (ref 39–117)
ALPHA1 GLOB SERPL ELPH-MCNC: 0.2 G/DL (ref 0–0.4)
ALPHA2 GLOB SERPL ELPH-MCNC: 0.8 G/DL (ref 0.4–1)
B-GLOBULIN SERPL ELPH-MCNC: 1.2 G/DL (ref 0.7–1.3)
FERRITIN SERPL-MCNC: 75 NG/ML (ref 15–150)
GAMMA GLOB SERPL ELPH-MCNC: 0.6 G/DL (ref 0.4–1.8)
GLOBULIN SER CALC-MCNC: 2.8 G/DL (ref 2.2–3.9)
HAV IGM SERPL QL IA: NEGATIVE
HBV CORE IGM SERPL QL IA: NEGATIVE
HBV SURFACE AG SERPL QL IA: NEGATIVE
HCV AB S/CO SERPL IA: <0.1 S/CO RATIO (ref 0–0.9)
IRON SATN MFR SERPL: 30 % (ref 15–55)
IRON SERPL-MCNC: 108 UG/DL (ref 27–139)
LABORATORY COMMENT REPORT: NORMAL
M PROTEIN SERPL ELPH-MCNC: NORMAL G/DL
MITOCHONDRIA M2 IGG SER-ACNC: <20 UNITS (ref 0–20)
PROT SERPL-MCNC: 6.5 G/DL (ref 6–8.5)
TIBC SERPL-MCNC: 357 UG/DL (ref 250–450)
UIBC SERPL-MCNC: 249 UG/DL (ref 118–369)
VIT B12 SERPL-MCNC: 545 PG/ML (ref 232–1245)
VIT B6 SERPL-MCNC: 6.4 UG/L (ref 2–32.8)

## 2020-01-11 NOTE — TELEPHONE ENCOUNTER
1/11/20 notified with labs, elevated isolated alkaline phosphatase level, will repeat labs 3 months including alkaline phosphatase isoenzymes, if still elevated consider MRCP, laboratory slip printed and mailed to her   SPECIFIC PATIENT INSTRUCTIONS FROM THE PHYSICIAN WHO TREATED YOU IN THE ER TODAY  1. Return if any concerns or worsening of condition(s)  2. Penicillin as prescribed until finished. Norco for pain not controlled with over the counter Tylenol. 3.  Follow up with your dentist or a dental clinic from the sheets given to you in the ER today as soon as possible. Tooth and Gum Pain: Care Instructions  Your Care Instructions    The most common causes of dental pain are tooth decay and gum disease. Pain can also be caused by an infection of the tooth (abscess) or the gums. Or you may have pain from a broken or cracked tooth. Other causes of pain include infection and damage to a tooth from nervous grinding of your teeth. A wisdom tooth can be painful when it is coming in but cannot break through the gum. It can also be painful when the tooth is only partway in and extra gum tissue has formed around it. The tissue can get inflamed (pericoronitis), and sometimes it gets infected. Prompt dental care can help find the cause of your toothache and keep the tooth from dying or gum disease from getting worse. Self-care at home may reduce your pain and discomfort. Follow-up care is a key part of your treatment and safety. Be sure to make and go to all appointments, and call your dentist or doctor if you are having problems. It's also a good idea to know your test results and keep a list of the medicines you take. How can you care for yourself at home? · To reduce pain and facial swelling, put an ice or cold pack on the outside of your cheek for 10 to 20 minutes at a time. Put a thin cloth between the ice and your skin. Do not use heat. · If your doctor prescribed antibiotics, take them as directed. Do not stop taking them just because you feel better. You need to take the full course of antibiotics.   · Ask your doctor if you can take an over-the-counter pain medicine, such as acetaminophen (Tylenol), ibuprofen (Advil, Motrin), or naproxen (Aleve). Be safe with medicines. Read and follow all instructions on the label. · Avoid very hot, cold, or sweet foods and drinks if they increase your pain. · Rinse your mouth with warm salt water every 2 hours to help relieve pain and swelling. Mix 1 teaspoon of salt in 8 ounces of water. · Talk to your dentist about using special toothpaste for sensitive teeth. To reduce pain on contact with heat or cold or when brushing, brush with this toothpaste regularly or rub a small amount of the paste on the sensitive area with a clean finger 2 or 3 times a day. Floss gently between your teeth. · Do not smoke or use spit tobacco. Tobacco use can make gum problems worse, decreases your ability to fight infection in your gums, and delays healing. If you need help quitting, talk to your doctor about stop-smoking programs and medicines. These can increase your chances of quitting for good. When should you call for help? Call 911 anytime you think you may need emergency care. For example, call if:  · You have trouble breathing. Call your dentist or doctor now or seek immediate medical care if:  · You have signs of infection, such as:  ¨ Increased pain, swelling, warmth, or redness. ¨ Red streaks leading from the area. ¨ Pus draining from the area. ¨ A fever. Watch closely for changes in your health, and be sure to contact your doctor if:  · You do not get better as expected. Where can you learn more? Go to http://mar-david.info/. Enter 0363 [id number] in the search box to learn more about \"Tooth and Gum Pain: Care Instructions. \"  Current as of: August 11, 2016  Content Version: 11.3  © 2096-5625 [employer]. Care instructions adapted under license by [employer] (which disclaims liability or warranty for this information).  If you have questions about a medical condition or this instruction, always ask your healthcare professional. [employer] disclaims any warranty or liability for your use of this information. MyChart Activation    Thank you for requesting access to [employer]. Please follow the instructions below to securely access and download your online medical record. [employer] allows you to send messages to your doctor, view your test results, renew your prescriptions, schedule appointments, and more. How Do I Sign Up? 1. In your internet browser, go to [email or web address]. 2. Click on the First Time User? Click Here link in the Sign In box. You will see the New Member Sign Up page. 3. Enter your [employer] Access Code exactly as it appears below. You will not need to use this code after youve completed the sign-up process. If you do not sign up before the expiration date, you must request a new code. [employer] Access Code: [email or web address]  Expires: 2017  7:00 PM (This is the date your [employer] access code will )    4. Enter the last four digits of your Social Security Number (xxxx) and Date of Birth (mm/dd/yyyy) as indicated and click Submit. You will be taken to the next sign-up page. 5. Create a [employer] ID. This will be your [employer] login ID and cannot be changed, so think of one that is secure and easy to remember. 6. Create a [employer] password. You can change your password at any time. 7. Enter your Password Reset Question and Answer. This can be used at a later time if you forget your password. 8. Enter your e-mail address. You will receive e-mail notification when new information is available in [address] 9. Click Sign Up. You can now view and download portions of your medical record. 10. Click the Download Summary menu link to download a portable copy of your medical information. Additional Information    If you have questions, please visit the Frequently Asked Questions section of the [employer] website at [email or web address] Remember, [employer] is NOT to be used for urgent needs. For medical emergencies, dial 911.

## 2020-01-15 ENCOUNTER — HOSPITAL ENCOUNTER (OUTPATIENT)
Dept: RADIOLOGY | Facility: MEDICAL CENTER | Age: 65
End: 2020-01-15

## 2020-01-22 ENCOUNTER — TELEPHONE (OUTPATIENT)
Dept: MEDICAL GROUP | Facility: MEDICAL CENTER | Age: 65
End: 2020-01-22

## 2020-01-31 ENCOUNTER — HOSPITAL ENCOUNTER (OUTPATIENT)
Dept: RADIOLOGY | Facility: MEDICAL CENTER | Age: 65
End: 2020-01-31

## 2020-02-04 NOTE — TELEPHONE ENCOUNTER
Left a message for patient to call back at [phone].     Barb Kline  [facility] Assistant

## 2020-05-22 LAB
ALBUMIN SERPL-MCNC: 4.2 G/DL (ref 3.8–4.8)
ALP BONE CFR SERPL: 42 % (ref 14–68)
ALP INTEST CFR SERPL: 0 % (ref 0–18)
ALP LIVER CFR SERPL: 58 % (ref 18–85)
ALP SERPL-CCNC: 115 IU/L (ref 39–117)
ALT SERPL-CCNC: 40 IU/L (ref 0–32)
AST SERPL-CCNC: 35 IU/L (ref 0–40)
BILIRUB DIRECT SERPL-MCNC: 0.08 MG/DL (ref 0–0.4)
BILIRUB SERPL-MCNC: 0.2 MG/DL (ref 0–1.2)
GGT SERPL-CCNC: 69 IU/L (ref 0–60)
PROT SERPL-MCNC: 6.4 G/DL (ref 6–8.5)

## 2020-05-23 ENCOUNTER — TELEPHONE (OUTPATIENT)
Dept: MEDICAL GROUP | Facility: MEDICAL CENTER | Age: 65
End: 2020-05-23

## 2020-05-23 NOTE — TELEPHONE ENCOUNTER
Notified with results, hepatic steatosis on ultrasound, likely BANKS, alkaline phosphatase normalized, fractionation is negative, ultrasound did show fatty liver otherwise negative, advised her to continue limit alcohol, avoid processed foods, continue nutrition, diet, exercise, no follow-up needed at this time, will monitor yearly hepatic function panel

## 2020-11-04 ENCOUNTER — TELEPHONE (OUTPATIENT)
Dept: MEDICAL GROUP | Facility: MEDICAL CENTER | Age: 65
End: 2020-11-04

## 2020-11-05 ENCOUNTER — TELEPHONE (OUTPATIENT)
Dept: MEDICAL GROUP | Facility: MEDICAL CENTER | Age: 65
End: 2020-11-05

## 2020-11-05 NOTE — TELEPHONE ENCOUNTER
Please notify the patient that her mammogram done at [facility] is negative, we can repeat the mammogram in 1 year.

## 2020-11-05 NOTE — TELEPHONE ENCOUNTER
----- Message from Feliciano Olson M.D. sent at 11/4/2020  4:22 PM PST -----  Please notify the patient that her mammogram done at [facility] is negative, we can repeat the mammogram in 1 year.

## 2020-12-14 ENCOUNTER — TELEPHONE (OUTPATIENT)
Dept: MEDICAL GROUP | Facility: MEDICAL CENTER | Age: 65
End: 2020-12-14

## 2020-12-14 ENCOUNTER — OFFICE VISIT (OUTPATIENT)
Dept: MEDICAL GROUP | Facility: MEDICAL CENTER | Age: 65
End: 2020-12-14
Payer: COMMERCIAL

## 2020-12-14 VITALS
DIASTOLIC BLOOD PRESSURE: 76 MMHG | HEIGHT: 65 IN | WEIGHT: 154 LBS | TEMPERATURE: 98.5 F | OXYGEN SATURATION: 94 % | SYSTOLIC BLOOD PRESSURE: 134 MMHG | BODY MASS INDEX: 25.66 KG/M2 | HEART RATE: 77 BPM

## 2020-12-14 DIAGNOSIS — R94.5 ABNORMAL LIVER FUNCTION: ICD-10-CM

## 2020-12-14 DIAGNOSIS — Z86.69 HISTORY OF MIGRAINE: ICD-10-CM

## 2020-12-14 DIAGNOSIS — Z00.00 PREVENTATIVE HEALTH CARE: Chronic | ICD-10-CM

## 2020-12-14 PROCEDURE — 99397 PER PM REEVAL EST PAT 65+ YR: CPT | Performed by: INTERNAL MEDICINE

## 2020-12-14 ASSESSMENT — ENCOUNTER SYMPTOMS
HEARTBURN: 0
INSOMNIA: 0
SHORTNESS OF BREATH: 0
PALPITATIONS: 0
BLOOD IN STOOL: 0
FOCAL WEAKNESS: 0
ABDOMINAL PAIN: 0
HEADACHES: 1
DIARRHEA: 0
BACK PAIN: 0
COUGH: 0
DEPRESSION: 0
WEIGHT LOSS: 0
DIZZINESS: 0

## 2020-12-14 ASSESSMENT — PATIENT HEALTH QUESTIONNAIRE - PHQ9: CLINICAL INTERPRETATION OF PHQ2 SCORE: 0

## 2020-12-14 ASSESSMENT — FIBROSIS 4 INDEX: FIB4 SCORE: 1.55

## 2020-12-14 NOTE — PROGRESS NOTES
Subjective:      Adrienne Morales is a 65 y.o. female who presents with annual           HPI     Here for annual exam   medication, allergies, medical history, surgical history, social history reviewed and updated  SH retired, , lives with daughter and her son, trying to keep active by walking outside, tries to eat healthy and does cooking at home, limits sweets, caffeine 4 cups per day, stops caffeine at noon, no soda regularly, drinks water, limited etoh,  practicing social distancing, limiting gatherings during COVID-19 pandemic  Sees gynecologist annually    Sees  eye exam   Taking vit d 2000 units daily   On imitrex as needed, has tried inderal in the past, as well as other medications for prevention of migraine headache, and it has been quite sometime before she has tried medication for prevention of headaches.  No obvious triggers, triggered by meals or exercise related.  Imitrex is effective in alleviating her migraine headaches.      Current Outpatient Medications   Medication Sig Dispense Refill   • sumatriptan (IMITREX) 100 MG tablet TAKE 1 TABLET BY MOUTH ONCE AS NEEDED FOR MIGRIANE FOR UP TO 1 DOSE 12 Tab 3   • Cholecalciferol (VITAMIN D) 2000 UNIT Tab Take  by mouth every day.     • Naproxen Sodium (ALEVE PO) Take  by mouth.     • Pramoxine HCl 1 % Lotion otc use as directed 1 Bottle 0     No current facility-administered medications for this visit.         Sleep disordered breathing  12/12/19 OPO through preferred see if perhaps she has sleep apnea contributing to migraine headaches  1/6/20 OPO through preferred time less than 88% 23 minutes, low saturation 76%, basal saturation 92%    Preventative health  12/13/13 colon per [facility] diverticulosis repeat 10 years  9/12/14 tdap  3/7/17  gyn pap negative  12/11/18 declines shingrix vaccine  12/31/19 hep c ab negative  12/31/19 dexa at [facility] LS-0.5,hip-1.9  1/11/20 vit d 43  1/15/20 cologuard negative  11/3/20 mammogram at  [facility] negative     Osteopenia  12/10/12 dexa LS -0.9, hip -1.2 per gyn  9/16/14 vit d 28 start 2000 units  10/6/15 dexa LS 0.0,hip -1.8 ;FRAX 9% major,1.0% hip  10/14/15 vit d 32   7/22/16 start 2000 units   10/19/16 vit d 35 on 2000 units done at [facility]  10/20/17 vit d 37 done at [facility]  11/10/17 dexa LS-0.5,hip-2.0;FRAX 10% major,1.5% hip done at [facility]  11/27/18 vit d 42  12/31/19 dexa at [facility] LS-0.5,hip-1.9 done at [facility],FRAX 10.8% major,2.5% hip  1/11/20 vit d 43     Insomnia uses Ambien for travel    Impaired glucose metabolism  12/31/19 A1c 5.8%    History of perforated tympanic membrane     History migraine tried Inderal previously but caused low blood pressure  Tried inderal in her 20s, but caused low bp  10/24/17 more migraine recently and declines medication      History endometrial cancer  3/10 JACKLYN BSO for stage I endometrial cancer   2013 gyn exam  sees twice per year  9/14 sees  gyn  3/7/17 pap per gyn   3/18 saw  gyn      Family history cardiovascular disease  Father CABG and sister heart disease  11/20/13 echo normal LV 60%  12/10/13 CT calcium score 0     Dyslipidemia  11/20/13 echo normal LV 60%  11/14/13 chol 191,trig 113,hdl 61,ldl 107  12/10/13 CT calcium score 0  9/16/14 chol 206,trig 91,hdl 74,ldl 114  10/14/15 chol 215,trig 83,hdl 81,ldl 117  10/19/16 chol 189,trig 127,hdl 61,ldl 103 done at [facility]  10/24/17 chol 205,trig 83,hdl 66,ldl 121  11/27/18 chol 204,trig 121,hdl 62,ldl 121  12/31/19 chol 204,trig 130,hdl 65,ldl 113     abn liver enzymes  9/15/14 AST 22,ALT 35,alk phos 118,bili 0.3  11/27/18 AST 42,ALT 50,alk phos 121,bili 0.3  12/31/19 AST 56,ALT 30,alk phos 122,bili 0.4  1/8/20 ultrasound abdomen done at [facility], diffusely echogenic liver likely representing hepatic steatosis, no focal mass, common bile duct within normal limits 0.3 cm, pancreas unremarkable, gallbladder and spleen unremarkable  1/11/20 alk phos 137, acute hepatitis panel negative, iron 108,%sat 30,  ferritin 75, SPEP negative, AMA negative, smooth muscle antibody negative  5/22/20 AST 35,ALT 40,GGT 69, alkaline phosphatase 115, liver fraction 58% (18-85%), bone fractions 42% (14-68%), bilirubin 0.2              Patient Active Problem List   Diagnosis   • Preventative health care   • Insomnia   • History of endometrial cancer   • History of migraine   • Osteopenia   • History of perforated tympanic membrane   • Family history of cardiovascular disease   • Dyslipidemia   • Abnormal liver function   • Dermatochalasis   • Impaired glucose metabolism   • Sleep disorder breathing       Depression Screening    Little interest or pleasure in doing things?  0 - not at all  Feeling down, depressed , or hopeless? 0 - not at all  Patient Health Questionnaire Score: 0            Health Maintenance Summary                IMM ZOSTER VACCINES Postponed 5/14/2021 Originally 3/28/2005. Patient Refused    IMM INFLUENZA Postponed 12/14/2021 Originally 9/1/2020. Patient Refused     Done 10/26/2018 Imm Admin: Influenza Vaccine Quad Inj (Pf)    IMM PNEUMOCOCCAL VACCINE: 65+ Years Postponed 12/14/2021 Originally 3/28/2020. Patient Refused    MAMMOGRAM Next Due 11/3/2021      Done 11/3/2020 [id number]     Patient has more history with this topic...    BONE DENSITY Next Due 11/10/2022      Done 11/10/2017 DS-BONE DENSITY STUDY (DEXA)     Patient has more history with this topic...    COLONOSCOPY Next Due 12/13/2023      Done 12/13/2013 AMB REFERRAL TO GI FOR COLONOSCOPY    IMM DTaP/Tdap/Td Vaccine Next Due 9/12/2024      Done 9/12/2014 Imm Admin: Tdap Vaccine          Patient Care Team:  Feliciano Olson M.D. as PCP - General (Internal Medicine)      Review of Systems   Constitutional: Negative for weight loss.   HENT: Negative for hearing loss.    Respiratory: Negative for cough and shortness of breath.    Cardiovascular: Negative for chest pain and palpitations.   Gastrointestinal: Negative for abdominal pain, blood in  stool, diarrhea and heartburn.   Genitourinary: Negative for frequency.   Musculoskeletal: Negative for back pain.   Neurological: Positive for headaches. Negative for dizziness and focal weakness.   Psychiatric/Behavioral: Negative for depression. The patient does not have insomnia.          Objective    Physical Exam  Vitals signs and nursing note reviewed.   Constitutional:       Appearance: Normal appearance.   HENT:      Head: Normocephalic and atraumatic.      Right Ear: External ear normal.      Left Ear: External ear normal.   Eyes:      General:         Right eye: No discharge.   Cardiovascular:      Rate and Rhythm: Normal rate and regular rhythm.      Heart sounds: Normal heart sounds. No murmur.   Pulmonary:      Effort: No respiratory distress.      Breath sounds: Normal breath sounds.   Abdominal:      General: There is no distension.   Skin:     General: Skin is warm.   Neurological:      General: No focal deficit present.      Mental Status: She is alert.   Psychiatric:         Mood and Affect: Mood normal.         Behavior: Behavior normal.                 Assessment/Plan:        Assessment  #! Annual exam    #2  Dyslipidemia diet controlled 12/31/19 chol 204,trig 130,hdl 65,ldl 113    #3 history abnormal liver enzymes 5/22/20 AST 35,ALT 40,GGT 69, alkaline phosphatase 115, liver fraction 58% (18-85%), bone fractions 42% (14-68%), bilirubin 0.2, ultrasound 1/8/20 echogenic fatty liver, otherwise unremarkable    #4 migraine headaches, uses Imitrex as needed and is effective, she has tried previously medications for prevention although it has been quite some time since she has tried any medications to prevent migraines from occurring    #5 history of endometrial cancer status post JACKLYN/BSO followed by gynecology    #6 osteopenia, taking vitamin D most recent level 43 in January    #7 preventative health  12/13/13 colon per [facility] diverticulosis repeat 10 years  9/12/14 tdap  3/7/17  gyn pap  negative  12/31/19 hep c ab negative  12/31/19 dexa at [facility] LS-0.5,hip-1.9  1/11/20 vit d 43  1/15/20 cologuard negative  11/3/20 mammogram at [facility] negative  12/14/20 declines pneumovax, prevnar, influenza, shingrix vaccines    #8 history of low vitamin D on supplementation      Plan   #! Declines prevnar and pneumovax vaccines, it is recommended to get one of the 2 vaccines at 65 subsequently to receive the second vaccine 1 year later    #2 declines flu vaccine    #3 declines shingrix vaccine although if she changes her mind she can check with a local pharmacy    #4 continue imitrex offered other preventative medication options discussed but she declines for now, continue Imitrex refills    #5 next colon due 2023    #6 labs     #7 continue annual mammogram, bone density every 2 years, follow-up gynecology    #8 nutrition, diet, exercise discussed    #9 continue social distancing, mask wearing in public during COVID-19 pandemic    #10 continue to limit social gatherings and get-togethers during the holidays because of Covid pandemic    #11 labs    #12 follow-up 1 year

## 2020-12-16 ENCOUNTER — HOSPITAL ENCOUNTER (OUTPATIENT)
Dept: RADIOLOGY | Facility: MEDICAL CENTER | Age: 65
End: 2020-12-16
Payer: COMMERCIAL

## 2020-12-17 ENCOUNTER — HOSPITAL ENCOUNTER (OUTPATIENT)
Dept: RADIOLOGY | Facility: MEDICAL CENTER | Age: 65
End: 2020-12-17
Payer: COMMERCIAL

## 2021-01-01 NOTE — TELEPHONE ENCOUNTER
Please notify patient that her Cologuard stool test is negative   received about 14 hours of phototherapy

## 2021-01-06 ENCOUNTER — TELEPHONE (OUTPATIENT)
Dept: MEDICAL GROUP | Facility: MEDICAL CENTER | Age: 66
End: 2021-01-06

## 2021-01-06 DIAGNOSIS — R94.5 ABNORMAL LIVER FUNCTION: ICD-10-CM

## 2021-01-06 LAB
25(OH)D3+25(OH)D2 SERPL-MCNC: 43.7 NG/ML (ref 30–100)
ALBUMIN SERPL-MCNC: 4.4 G/DL (ref 3.8–4.8)
ALBUMIN/GLOB SERPL: 2 {RATIO} (ref 1.2–2.2)
ALP SERPL-CCNC: 142 IU/L (ref 39–117)
ALT SERPL-CCNC: 73 IU/L (ref 0–32)
AST SERPL-CCNC: 39 IU/L (ref 0–40)
BASOPHILS # BLD AUTO: 0.1 X10E3/UL (ref 0–0.2)
BASOPHILS NFR BLD AUTO: 1 %
BILIRUB SERPL-MCNC: 0.3 MG/DL (ref 0–1.2)
BUN SERPL-MCNC: 11 MG/DL (ref 8–27)
BUN/CREAT SERPL: 18 (ref 12–28)
CALCIUM SERPL-MCNC: 9.4 MG/DL (ref 8.7–10.3)
CHLORIDE SERPL-SCNC: 103 MMOL/L (ref 96–106)
CHOLEST SERPL-MCNC: 206 MG/DL (ref 100–199)
CO2 SERPL-SCNC: 27 MMOL/L (ref 20–29)
CREAT SERPL-MCNC: 0.62 MG/DL (ref 0.57–1)
EOSINOPHIL # BLD AUTO: 0.1 X10E3/UL (ref 0–0.4)
EOSINOPHIL NFR BLD AUTO: 1 %
ERYTHROCYTE [DISTWIDTH] IN BLOOD BY AUTOMATED COUNT: 12.6 % (ref 11.7–15.4)
GLOBULIN SER CALC-MCNC: 2.2 G/DL (ref 1.5–4.5)
GLUCOSE SERPL-MCNC: 101 MG/DL (ref 65–99)
HBA1C MFR BLD: 5.6 % (ref 4.8–5.6)
HCT VFR BLD AUTO: 43.9 % (ref 34–46.6)
HDLC SERPL-MCNC: 61 MG/DL
HGB BLD-MCNC: 14.6 G/DL (ref 11.1–15.9)
IMM GRANULOCYTES # BLD AUTO: 0 X10E3/UL (ref 0–0.1)
IMM GRANULOCYTES NFR BLD AUTO: 0 %
IMMATURE CELLS  115398: NORMAL
LABORATORY COMMENT REPORT: ABNORMAL
LDLC SERPL CALC-MCNC: 118 MG/DL (ref 0–99)
LYMPHOCYTES # BLD AUTO: 2 X10E3/UL (ref 0.7–3.1)
LYMPHOCYTES NFR BLD AUTO: 38 %
MCH RBC QN AUTO: 30.5 PG (ref 26.6–33)
MCHC RBC AUTO-ENTMCNC: 33.3 G/DL (ref 31.5–35.7)
MCV RBC AUTO: 92 FL (ref 79–97)
MONOCYTES # BLD AUTO: 0.5 X10E3/UL (ref 0.1–0.9)
MONOCYTES NFR BLD AUTO: 9 %
MORPHOLOGY BLD-IMP: NORMAL
NEUTROPHILS # BLD AUTO: 2.8 X10E3/UL (ref 1.4–7)
NEUTROPHILS NFR BLD AUTO: 51 %
NRBC BLD AUTO-RTO: NORMAL %
PLATELET # BLD AUTO: 234 X10E3/UL (ref 150–450)
POTASSIUM SERPL-SCNC: 4.4 MMOL/L (ref 3.5–5.2)
PROT SERPL-MCNC: 6.6 G/DL (ref 6–8.5)
RBC # BLD AUTO: 4.78 X10E6/UL (ref 3.77–5.28)
SODIUM SERPL-SCNC: 143 MMOL/L (ref 134–144)
TRIGL SERPL-MCNC: 154 MG/DL (ref 0–149)
TSH SERPL DL<=0.005 MIU/L-ACNC: 2.05 UIU/ML (ref 0.45–4.5)
VLDLC SERPL CALC-MCNC: 27 MG/DL (ref 5–40)
WBC # BLD AUTO: 5.4 X10E3/UL (ref 3.4–10.8)

## 2021-01-07 NOTE — TELEPHONE ENCOUNTER
Notified with labs, cholesterol stable, 10-year risk less than 6% no need for statin therapy, alkaline phosphatase and ALT elevated, previous work-up negative including ultrasound and serology.  Possible NAFLD, versus BANKS, no significant alcohol, follows a healthy diet.  We will repeat liver enzymes, lab ordered and FibroSure in May.  Lab slip printed and mailed to patient.

## 2021-02-15 ENCOUNTER — OFFICE VISIT (OUTPATIENT)
Dept: URGENT CARE | Facility: CLINIC | Age: 66
End: 2021-02-15
Payer: COMMERCIAL

## 2021-02-15 VITALS
BODY MASS INDEX: 26.02 KG/M2 | HEIGHT: 65 IN | WEIGHT: 156.2 LBS | SYSTOLIC BLOOD PRESSURE: 110 MMHG | RESPIRATION RATE: 16 BRPM | DIASTOLIC BLOOD PRESSURE: 64 MMHG | OXYGEN SATURATION: 97 % | HEART RATE: 94 BPM | TEMPERATURE: 98.1 F

## 2021-02-15 DIAGNOSIS — B02.9 HERPES ZOSTER WITHOUT COMPLICATION: ICD-10-CM

## 2021-02-15 PROCEDURE — 99214 OFFICE O/P EST MOD 30 MIN: CPT | Performed by: PHYSICIAN ASSISTANT

## 2021-02-15 RX ORDER — VALACYCLOVIR HYDROCHLORIDE 1 G/1
1000 TABLET, FILM COATED ORAL 3 TIMES DAILY
Qty: 21 TABLET | Refills: 0 | Status: SHIPPED | OUTPATIENT
Start: 2021-02-15 | End: 2021-02-22

## 2021-02-15 ASSESSMENT — FIBROSIS 4 INDEX: FIB4 SCORE: 1.27

## 2021-02-16 ENCOUNTER — TELEPHONE (OUTPATIENT)
Dept: URGENT CARE | Facility: CLINIC | Age: 66
End: 2021-02-16

## 2021-02-16 NOTE — PROGRESS NOTES
"Subjective:   Adrienne Morales is a 65 y.o. female who presents for Blister (on forehead x5 days)    Patient identity confirmed using two patient identifiers of last name and date of birth.     Blister  Associated symptoms include a rash.   Patient presents to urgent care with painful blister on right side of forehead present for the past 4 days with new smaller blisters today on right eyebrow and surrounding the larger blister. Patient reports scalp and right sided forehead sensitivity as well.    Denies eye pain, blurred vision, photosensitivity.   Patient has not yet had her Shigrix vaccine due to availability issues.      Reviewed past medical, surgical and family history.  Reviewed prescription and over-the-counter medications with patient and electronic health record today.    Review of Systems   Skin: Positive for rash.   All other systems reviewed and are negative.     Objective:   /64 (BP Location: Right arm, Patient Position: Sitting)   Pulse 94   Temp 36.7 °C (98.1 °F)   Resp 16   Ht 1.651 m (5' 5\")   Wt 70.9 kg (156 lb 3.2 oz) Comment: 156.2lb  SpO2 97%   BMI 25.99 kg/m²   Physical Exam  Vitals reviewed.   Constitutional:       Appearance: She is well-developed.   HENT:      Head: Normocephalic and atraumatic.        Comments: Right side of forehead with patch of scabbed vesicles with surrounding smaller vesicles as well as new vesicles beginning in the right eyebrow.  Tip of nose is clear.     Right Ear: External ear normal.      Left Ear: External ear normal.      Nose: Nose normal.      Mouth/Throat:      Mouth: Mucous membranes are moist.   Eyes:      General: Lids are normal.      Extraocular Movements: Extraocular movements intact.      Conjunctiva/sclera: Conjunctivae normal.      Right eye: Right conjunctiva is not injected.      Left eye: Left conjunctiva is not injected.      Pupils: Pupils are equal, round, and reactive to light.      Right eye: No fluorescein uptake.      Comments: " 1 drop of fluorescein instilled into right eye.  No fluorescein uptake or dendritic lesion identified.   Cardiovascular:      Rate and Rhythm: Normal rate and regular rhythm.      Heart sounds: Normal heart sounds.   Pulmonary:      Effort: Pulmonary effort is normal.      Breath sounds: Normal breath sounds.   Musculoskeletal:         General: Normal range of motion.      Cervical back: Normal range of motion and neck supple.   Lymphadenopathy:      Cervical: No cervical adenopathy.   Skin:     General: Skin is warm and dry.      Findings: No rash.   Neurological:      Mental Status: She is alert and oriented to person, place, and time.      Cranial Nerves: Cranial nerves are intact.      Sensory: Sensation is intact.      Motor: Motor function is intact.      Coordination: Coordination is intact.   Psychiatric:         Attention and Perception: Attention normal.         Mood and Affect: Mood normal.         Speech: Speech normal.         Behavior: Behavior normal.         Thought Content: Thought content normal.         Judgment: Judgment normal.          Assessment/Plan:   1. Herpes zoster without complication  - valacyclovir (VALTREX) 1 GM Tab; Take 1 tablet by mouth 3 times a day for 7 days.  Dispense: 21 tablet; Refill: 0    Patient will be treated with Valtrex as above as she is developing new vesicles today.  Given the location of her zoster we did discuss close observation for ophthalmologic symptoms.  If patient does develop eye pain, photosensitivity, etc. I would recommend ophthalmologic evaluation.  If she is unable to be seen by ophthalmology I recommend presentation to emergency department or urgent care for further evaluation and referral to specialty if warranted.    Upon entering exam room I ensured patient was wearing a mask.  This provider wore appropriate PPE throughout entire visit.  Patient wore mask entire visit except for a brief period while examining oropharynx.    Differential diagnosis,  natural history, supportive care, and indications for immediate follow-up discussed.  Red flag warning symptoms and strict ER/follow-up precautions given.  The patient demonstrated a good understanding and agreed with the treatment plan.  Please note that this note was created using voice recognition speech to text software. Every effort has been made to correct obvious errors.  However, I expect there are errors of grammar and possibly context that were not discovered prior to finalizing the note  SHAILESH Dumont PA-C

## 2021-02-16 NOTE — TELEPHONE ENCOUNTER
Patient had a few questions in regards to her Diagnosis from 02/15/2021. She'd like to have provider call her back to go over those.   Phone number is up to date.  [phone]: Patient contacted by telephone, all questions answered.  Patient has questions regarding if she could get this in her ear and if she does what she should do, she has been researching this online and is concerned about your involvement.  She has been having very mild intermittent discomfort in the right ear.  Patient also has questions if this could become oral manifestation as well.  She also has questions if there is some type of injectable antiviral as she has been told that a family member had that many years ago.  Reviewed with patient that if she does develop significant ear pain that she should be reevaluated.  Proceed with treatment discussed.  Nilam Dumont PA-C

## 2021-03-03 DIAGNOSIS — Z23 NEED FOR VACCINATION: ICD-10-CM

## 2021-04-08 DIAGNOSIS — Z86.69 HISTORY OF MIGRAINE: ICD-10-CM

## 2021-04-08 RX ORDER — SUMATRIPTAN 100 MG/1
TABLET, FILM COATED ORAL
Qty: 12 TABLET | Refills: 5 | Status: SHIPPED | OUTPATIENT
Start: 2021-04-08 | End: 2021-04-09 | Stop reason: SDUPTHER

## 2021-04-09 DIAGNOSIS — Z86.69 HISTORY OF MIGRAINE: ICD-10-CM

## 2021-04-09 RX ORDER — SUMATRIPTAN 100 MG/1
100 TABLET, FILM COATED ORAL
Qty: 12 TABLET | Refills: 5 | Status: SHIPPED | OUTPATIENT
Start: 2021-04-09 | End: 2021-05-10 | Stop reason: SDUPTHER

## 2021-04-11 ENCOUNTER — TELEPHONE (OUTPATIENT)
Dept: MEDICAL GROUP | Facility: MEDICAL CENTER | Age: 66
End: 2021-04-11

## 2021-04-12 NOTE — TELEPHONE ENCOUNTER
Need PAR please  (1) sumatriptan 100 mg tabs one po prn migraine headache  (2) #12 per 30 days  (3) diagnosis: migraine headache  (4) ICD 10: Z86.69  (5) comments: has tried nsais, stable and controlled on imitrex since 2012

## 2021-04-28 LAB
A2 MACROGLOB SERPL-MCNC: 222 MG/DL (ref 110–276)
ALBUMIN SERPL-MCNC: 4.6 G/DL (ref 3.8–4.8)
ALP SERPL-CCNC: 144 IU/L (ref 39–117)
ALT SERPL W P-5'-P-CCNC: 89 IU/L (ref 0–40)
ALT SERPL-CCNC: 79 IU/L (ref 0–32)
APO A-I SERPL-MCNC: 170 MG/DL (ref 116–209)
AST SERPL W P-5'-P-CCNC: 66 IU/L (ref 0–40)
AST SERPL-CCNC: 66 IU/L (ref 0–40)
BILIRUB DIRECT SERPL-MCNC: 0.09 MG/DL (ref 0–0.4)
BILIRUB SERPL-MCNC: 0.3 MG/DL (ref 0–1.2)
BILIRUB SERPL-MCNC: 0.3 MG/DL (ref 0–1.2)
CHOLEST SERPL-MCNC: 198 MG/DL (ref 100–199)
FIBROSIS SCORING 550107: ABNORMAL
FIBROSIS STAGE SERPL QL: ABNORMAL
GGT SERPL-CCNC: 95 IU/L (ref 0–60)
GGT SERPL-CCNC: 95 IU/L (ref 0–60)
GLUCOSE SERPL-MCNC: 89 MG/DL (ref 65–99)
HAPTOGLOB SERPL-MCNC: 162 MG/DL (ref 37–355)
LABORATORY COMMENT REPORT: ABNORMAL
LIVER FIBR SCORE SERPL CALC.FIBROSURE: 0.23 (ref 0–0.21)
Lab: 0.65 (ref 0–0.3)
Lab: ABNORMAL
NECROINFLAMMATORY ACT GRADE SERPL QL: ABNORMAL
NECROINFLAMMATORY ACT SCORE SERPL: 0.25
PROT SERPL-MCNC: 7.2 G/DL (ref 6–8.5)
SERVICE CMNT-IMP: ABNORMAL
TRIGL SERPL-MCNC: 128 MG/DL (ref 0–149)

## 2021-04-30 ENCOUNTER — TELEPHONE (OUTPATIENT)
Dept: MEDICAL GROUP | Facility: MEDICAL CENTER | Age: 66
End: 2021-04-30

## 2021-04-30 NOTE — TELEPHONE ENCOUNTER
Notified with results, likely NAFLD, continue to minimize alcohol, work on decreasing processed foods, carbohydrate portion serving sizes in her diet.  Previous work-up negative for viral hepatitis, iron overload, autoimmune disease, ultrasound showed hepatic steatosis.  We will repeat liver enzymes in August.  She will call me when she is ready for that.

## 2021-11-12 ENCOUNTER — TELEPHONE (OUTPATIENT)
Dept: MEDICAL GROUP | Facility: MEDICAL CENTER | Age: 66
End: 2021-11-12

## 2021-11-12 NOTE — TELEPHONE ENCOUNTER
Please notify the patient that her mammogram done at [facility] is negative.  She can repeat that in a year.

## 2021-12-16 ENCOUNTER — OFFICE VISIT (OUTPATIENT)
Dept: MEDICAL GROUP | Facility: MEDICAL CENTER | Age: 66
End: 2021-12-16
Payer: COMMERCIAL

## 2021-12-16 VITALS
WEIGHT: 153 LBS | HEIGHT: 65 IN | OXYGEN SATURATION: 98 % | SYSTOLIC BLOOD PRESSURE: 108 MMHG | TEMPERATURE: 98.6 F | DIASTOLIC BLOOD PRESSURE: 66 MMHG | BODY MASS INDEX: 25.49 KG/M2 | HEART RATE: 87 BPM

## 2021-12-16 DIAGNOSIS — Z86.19 HISTORY OF SHINGLES: ICD-10-CM

## 2021-12-16 DIAGNOSIS — H91.90 HEARING LOSS, UNSPECIFIED HEARING LOSS TYPE, UNSPECIFIED LATERALITY: ICD-10-CM

## 2021-12-16 DIAGNOSIS — Z00.00 PREVENTATIVE HEALTH CARE: Chronic | ICD-10-CM

## 2021-12-16 DIAGNOSIS — H93.13 TINNITUS OF BOTH EARS: ICD-10-CM

## 2021-12-16 DIAGNOSIS — Z86.69 HISTORY OF MIGRAINE: ICD-10-CM

## 2021-12-16 DIAGNOSIS — R94.5 ABNORMAL LIVER FUNCTION: ICD-10-CM

## 2021-12-16 DIAGNOSIS — E55.9 VITAMIN D DEFICIENCY: ICD-10-CM

## 2021-12-16 PROCEDURE — 99397 PER PM REEVAL EST PAT 65+ YR: CPT | Performed by: INTERNAL MEDICINE

## 2021-12-16 RX ORDER — SUMATRIPTAN 100 MG/1
100 TABLET, FILM COATED ORAL
Qty: 10 TABLET | Refills: 6 | Status: SHIPPED | OUTPATIENT
Start: 2021-12-16 | End: 2022-09-13

## 2021-12-16 ASSESSMENT — PATIENT HEALTH QUESTIONNAIRE - PHQ9: CLINICAL INTERPRETATION OF PHQ2 SCORE: 0

## 2021-12-16 ASSESSMENT — FIBROSIS 4 INDEX: FIB4 SCORE: 2.09

## 2021-12-16 NOTE — PROGRESS NOTES
John Morales is a 66 y.o. female who presents with Annual Exam            HPI       Here for annual   Medications, allergies, medical history, surgical history, social history, family history  reviewed and updated  FH reviewed  SH , no tobacco, etoh 4 per week, tries to eat a healthy diet and limit processed food intake. Does drink water. Coffee three per day. No soda.   Recent labs in April abnormal liver enzymes, secondary work-up previously negative, previous ultrasound demonstrated hepatic steatosis, she limits alcohol intake at most 4/week and has been working on her nutrition program.  Migraine headaches have been stable using Imitrex as needed.  She also has been looking for a new gynecologist.  Had shingles early in 2021 with no recurrence, she has not gotten the shingles vaccine yet.  Chronic tinnitus bilateral high pitched, some hearing loss, no dizziness         Current Outpatient Medications   Medication Sig Dispense Refill   • Cholecalciferol (VITAMIN D) 2000 UNIT Tab Take  by mouth every day.     • Naproxen Sodium (ALEVE PO) Take  by mouth.     • sumatriptan (IMITREX) 100 MG tablet Take 1 tablet by mouth one time as needed for Migraine (as needed once daily). TAKE 1 TABLET BY MOUTH ONCE AS NEEDED FOR MIGRAINE FOR UP TO 1 DOSE 10 tablet 6   • Pramoxine HCl 1 % Lotion otc use as directed (Patient not taking: Reported on 2/15/2021) 1 Bottle 0     No current facility-administered medications for this visit.            Sleep disordered breathing  12/12/19 OPO through preferred see if perhaps she has sleep apnea contributing to migraine headaches  1/6/20 OPO through preferred time less than 88% 23 minutes, low saturation 76%, basal saturation 92%     Preventative health  12/13/13 colon per [facility] diverticulosis repeat 10 years  9/12/14 tdap  3/7/17  gyn pap negative  12/31/19 hep c ab negative  12/31/19 dexa at [facility] LS-0.5,hip-1.9  1/15/20 cologuard negative  11/3/20 mammogram at  [facility] negative  12/14/20 declines pneumovax, prevnar, influenza, shingrix vaccines  1/6/21 vit d 44  5/17/21 covid pfizer second  11/10/21 mammogram done at [facility]     Osteopenia  12/10/12 dexa LS -0.9, hip -1.2 per gyn  9/16/14 vit d 28 start 2000 units  10/6/15 dexa LS 0.0,hip -1.8 ;FRAX 9% major,1.0% hip  10/14/15 vit d 32   7/22/16 start 2000 units   10/19/16 vit d 35 on 2000 units done at [facility]  10/20/17 vit d 37 done at [facility]  11/10/17 dexa LS-0.5,hip-2.0;FRAX 10% major,1.5% hip done at [facility]  11/27/18 vit d 42  12/31/19 dexa at [facility] LS-0.5,hip-1.9 done at [facility],FRAX 10.8% major,2.5% hip  1/11/20 vit d 43  1/6/21 vit d 44     Insomnia uses Ambien for travel     Impaired glucose metabolism  12/31/19 A1c 5.8%  1/6/21 A1c 5.6%     History of perforated tympanic membrane     History migraine tried Inderal previously but caused low blood pressure  Tried inderal in her 20s, but caused low bp  10/24/17 more migraine recently and declines medication   12/11/18 uses imitrex as needed  12/14/20 on imitrex as needed     History endometrial cancer  3/10 [facility] for stage I endometrial cancer   2013 gyn exam  sees twice per year  9/14 sees  gyn  3/7/17 pap per gyn   3/18 saw  gyn      Family history cardiovascular disease  Father CABG and sister heart disease  11/20/13 echo normal LV 60%  12/10/13 CT calcium score 0     Dyslipidemia  11/20/13 echo normal LV 60%  11/14/13 chol 191,trig 113,hdl 61,ldl 107  12/10/13 CT calcium score 0  9/16/14 chol 206,trig 91,hdl 74,ldl 114  10/14/15 chol 215,trig 83,hdl 81,ldl 117  10/19/16 chol 189,trig 127,hdl 61,ldl 103 done at [facility]  10/24/17 chol 205,trig 83,hdl 66,ldl 121  11/27/18 chol 204,trig 121,hdl 62,ldl 121  12/31/19 chol 204,trig 130,hdl 65,ldl 113  1/6/21 chol 206,trig 154,hdl 61,ldl 118; 10 year risk 5.75%     abn liver enzymes  9/15/14 AST 22,ALT 35,alk phos 118,bili 0.3  11/27/18 AST 42,ALT 50,alk phos 121,bili 0.3  12/31/19 AST 56,ALT 30,alk phos 122,bili  "0.4  1/8/20 ultrasound abdomen done at [facility], diffusely echogenic liver likely representing hepatic steatosis, no focal mass, common bile duct within normal limits 0.3 cm, pancreas unremarkable, gallbladder and spleen unremarkable  1/11/20 alk phos 137, acute hepatitis panel negative, iron 108,%sat 30, ferritin 75, SPEP negative, AMA negative, smooth muscle antibody negative  5/22/20 AST 35,ALT 40,GGT 69, alkaline phosphatase 115, liver fraction 58% (18-85%), bone fractions 42% (14-68%), bilirubin 0.2  1/6/21 alk phos 142,AST 39,ALT 73,bili 0.3  4/26/21 AST 66, ALT 89, GGT 95, fibrosis score 0.23 (stage F0-F1), steatosis score 0.65 (S2 moderate steatosis), BANKS 0.25 (N0)                    Patient Active Problem List   Diagnosis   • Preventative health care   • Insomnia   • History of endometrial cancer   • History of migraine   • Osteopenia   • History of perforated tympanic membrane   • Family history of cardiovascular disease   • Dyslipidemia   • Abnormal liver function   • Dermatochalasis   • Impaired glucose metabolism   • Sleep disorder breathing     Depression Screening    Little interest or pleasure in doing things?  0 - not at all  Feeling down, depressed , or hopeless? 0 - not at all  Patient Health Questionnaire Score: 0             Patient Care Team:  Feliciano Olson M.D. as PCP - General (Internal Medicine)      ROS           Objective     /66 (BP Location: Right arm, Patient Position: Sitting, BP Cuff Size: Adult)   Pulse 87   Temp 37 °C (98.6 °F)   Ht 1.651 m (5' 5\")   Wt 69.4 kg (153 lb)   SpO2 98%   BMI 25.46 kg/m²      Physical Exam  Vitals and nursing note reviewed.   HENT:      Head: Normocephalic and atraumatic.      Right Ear: External ear normal.      Left Ear: External ear normal.   Eyes:      Conjunctiva/sclera: Conjunctivae normal.   Cardiovascular:      Rate and Rhythm: Normal rate and regular rhythm.      Heart sounds: Normal heart sounds.   Pulmonary:      Breath sounds: " Normal breath sounds.   Abdominal:      General: There is no distension.   Musculoskeletal:         General: No swelling.   Skin:     General: Skin is warm.   Neurological:      Mental Status: She is alert.   Psychiatric:         Mood and Affect: Mood normal.         Behavior: Behavior normal.                             Assessment & Plan          Assessment  #! Annual exam     #2 history shingles left forehead in january     #3 hearing loss and tinnitus     #4 migraine headache stable Imitrex as needed has been effective    #5 osteopenia postmenopausal bone loss last bone density December 31 at [facility]    #6 dyslipidemia diet controlled    #7 abnormal liver enzymes most recent labs 4/26/21 AST 66, ALT 89, GGT 95, fibrosis score 0.23 (stage F0-F1), steatosis score 0.65 (S2 moderate steatosis), BANKS 0.25 (N0), imaging done 1/8/20 ultrasound abdomen done at [facility], diffusely echogenic liver likely representing hepatic steatosis, no focal mass, common bile duct within normal limits 0.3 cm, pancreas unremarkable, gallbladder and spleen unremarkable, previous secondary work-up 1/11/20 alk phos 137, acute hepatitis panel negative, iron 108,%sat 30, ferritin 75, SPEP negative, AMA negative, smooth muscle antibody negative, hepatic steatosis likely nutrition is a contributing factor, alcohol intake no more than 4 drinks per week    #8 preventive health  12/13/13 colon per [facility] diverticulosis repeat 10 years  9/12/14 tdap  3/7/17  gyn pap negative  12/31/19 hep c ab negative  12/31/19 dexa at [facility] LS-0.5,hip-1.9  1/15/20 cologuard negative  11/3/20 mammogram at [facility] negative  12/14/20 declines pneumovax, prevnar, influenza, shingrix vaccines  1/6/21 vit d 44  5/17/21 covid pfizer second  11/10/21 mammogram done at [facility]        Plan  #1 recommend getting the shingrix vaccine at her pharmacy    #2 recommend she consider other vaccines such as influenza vaccine, pneumococcal vaccine, particularly the COVID booster as she is greater  than 6 months out from her second shot, vaccine efficacy does decrease over time and puts her at risk of breakthrough infections, she understands we will consider    #3 dexa at [facility] after 1/2/22 order completed and faxed    #4 [facility] to see if they accept her insurance    #5 up-to-date on mammogram    #6 print out webIZ for covid documentation       #7 ENT evaluation referral placed    #8 labs ordered fasting    #9 refill imitrex     #10 continue work on nutrition, diet, exercise    #11 we will notify her with results    #12 follow-up 1 year

## 2022-01-12 ENCOUNTER — TELEPHONE (OUTPATIENT)
Dept: MEDICAL GROUP | Facility: MEDICAL CENTER | Age: 67
End: 2022-01-12

## 2022-01-13 ENCOUNTER — TELEPHONE (OUTPATIENT)
Dept: MEDICAL GROUP | Facility: MEDICAL CENTER | Age: 67
End: 2022-01-13

## 2022-01-13 LAB
25(OH)D3+25(OH)D2 SERPL-MCNC: 44.4 NG/ML (ref 30–100)
ALBUMIN SERPL-MCNC: 4.5 G/DL (ref 3.8–4.8)
ALBUMIN/GLOB SERPL: 1.8 {RATIO} (ref 1.2–2.2)
ALP SERPL-CCNC: 133 IU/L (ref 44–121)
ALT SERPL-CCNC: 40 IU/L (ref 0–32)
APPEARANCE UR: CLEAR
AST SERPL-CCNC: 24 IU/L (ref 0–40)
BACTERIA #/AREA URNS HPF: NORMAL /[HPF]
BASOPHILS # BLD AUTO: 0.1 X10E3/UL (ref 0–0.2)
BASOPHILS NFR BLD AUTO: 1 %
BILIRUB SERPL-MCNC: 0.3 MG/DL (ref 0–1.2)
BILIRUB UR QL STRIP: NEGATIVE
BUN SERPL-MCNC: 12 MG/DL (ref 8–27)
BUN/CREAT SERPL: 21 (ref 12–28)
CALCIUM SERPL-MCNC: 9.5 MG/DL (ref 8.7–10.3)
CASTS URNS MICRO: NORMAL
CASTS URNS QL MICRO: NORMAL /LPF
CHLORIDE SERPL-SCNC: 103 MMOL/L (ref 96–106)
CHOLEST SERPL-MCNC: 213 MG/DL (ref 100–199)
CO2 SERPL-SCNC: 24 MMOL/L (ref 20–29)
COLOR UR: YELLOW
CREAT SERPL-MCNC: 0.57 MG/DL (ref 0.57–1)
CRYSTALS URNS MICRO: NORMAL
EOSINOPHIL # BLD AUTO: 0.1 X10E3/UL (ref 0–0.4)
EOSINOPHIL NFR BLD AUTO: 1 %
EPI CELLS #/AREA URNS HPF: NORMAL /HPF (ref 0–10)
ERYTHROCYTE [DISTWIDTH] IN BLOOD BY AUTOMATED COUNT: 12.4 % (ref 11.7–15.4)
GGT SERPL-CCNC: 70 IU/L (ref 0–60)
GLOBULIN SER CALC-MCNC: 2.5 G/DL (ref 1.5–4.5)
GLUCOSE SERPL-MCNC: 101 MG/DL (ref 65–99)
GLUCOSE UR QL: NEGATIVE
HBA1C MFR BLD: 5.7 % (ref 4.8–5.6)
HCT VFR BLD AUTO: 44.1 % (ref 34–46.6)
HDLC SERPL-MCNC: 57 MG/DL
HGB BLD-MCNC: 14.9 G/DL (ref 11.1–15.9)
HGB UR QL STRIP: NEGATIVE
IMM GRANULOCYTES # BLD AUTO: 0 X10E3/UL (ref 0–0.1)
IMM GRANULOCYTES NFR BLD AUTO: 0 %
IMMATURE CELLS  115398: NORMAL
KETONES UR QL STRIP: NEGATIVE
LABORATORY COMMENT REPORT: ABNORMAL
LDLC SERPL CALC-MCNC: 128 MG/DL (ref 0–99)
LEUKOCYTE ESTERASE UR QL STRIP: NEGATIVE
LYMPHOCYTES # BLD AUTO: 1.9 X10E3/UL (ref 0.7–3.1)
LYMPHOCYTES NFR BLD AUTO: 29 %
MCH RBC QN AUTO: 30.5 PG (ref 26.6–33)
MCHC RBC AUTO-ENTMCNC: 33.8 G/DL (ref 31.5–35.7)
MCV RBC AUTO: 90 FL (ref 79–97)
MICRO URNS: NORMAL
MICRO URNS: NORMAL
MONOCYTES # BLD AUTO: 0.4 X10E3/UL (ref 0.1–0.9)
MONOCYTES NFR BLD AUTO: 6 %
MORPHOLOGY BLD-IMP: NORMAL
MUCOUS THREADS URNS QL MICRO: NORMAL
NEUTROPHILS # BLD AUTO: 4 X10E3/UL (ref 1.4–7)
NEUTROPHILS NFR BLD AUTO: 63 %
NITRITE UR QL STRIP: NEGATIVE
NRBC BLD AUTO-RTO: NORMAL %
PH UR STRIP: 6.5 [PH] (ref 5–7.5)
PLATELET # BLD AUTO: 221 X10E3/UL (ref 150–450)
POTASSIUM SERPL-SCNC: 4.1 MMOL/L (ref 3.5–5.2)
PROT SERPL-MCNC: 7 G/DL (ref 6–8.5)
PROT UR QL STRIP: NORMAL
RBC # BLD AUTO: 4.88 X10E6/UL (ref 3.77–5.28)
RBC #/AREA URNS HPF: NORMAL /HPF (ref 0–2)
RENAL EPI CELLS #/AREA URNS HPF: NORMAL /[HPF]
SODIUM SERPL-SCNC: 141 MMOL/L (ref 134–144)
SP GR UR: 1.02 (ref 1–1.03)
T VAGINALIS URNS QL MICRO: NORMAL
TRIGL SERPL-MCNC: 158 MG/DL (ref 0–149)
TSH SERPL DL<=0.005 MIU/L-ACNC: 2.04 UIU/ML (ref 0.45–4.5)
UNIDENT CRYS URNS QL MICRO: NORMAL
URINALYSIS REFLEX  377202: NORMAL
URNS CMNT MICRO: NORMAL
UROBILINOGEN UR STRIP-MCNC: 0.2 MG/DL (ref 0.2–1)
VLDLC SERPL CALC-MCNC: 28 MG/DL (ref 5–40)
WBC # BLD AUTO: 6.4 X10E3/UL (ref 3.4–10.8)
WBC #/AREA URNS HPF: NORMAL /HPF (ref 0–5)
YEAST #/AREA URNS HPF: NORMAL /[HPF]

## 2022-01-13 NOTE — TELEPHONE ENCOUNTER
Please notify the patient that her bone density test done at [facility] shows:  (1) normal bone strength of her back  (2) she still has decreased bone strength at her hip but that has not changed significantly from her previous bone density test, no medications are necessary  (3) have her continue to take her vitamin D, take calcium over-the-counter 1000 mg total per day, and work on obtaining regular weightbearing exercise and activity  (4) we can repeat her bone density test in 2 years

## 2022-01-14 NOTE — TELEPHONE ENCOUNTER
Notified with results, alkaline phosphatase decreased, previous work-up negative, AST, ALT improved, GGT still slightly high but given the improvement in liver enzymes and previous work-up, no further work-up necessary we will continue to monitor, previous studies indicated steatosis, work on continue to minimize alcohol, decreasing carbohydrate portion serving sizes.  Cholesterol stable, 10-year risk less than 7.5% no need for statin therapy, A1c and fasting blood sugar stable continue work on good exercise and nutrition program.  Continue vitamin D supplementation, based on her bone density she can take at least 500 mg supplemental calcium along with calcium in her standard diet.

## 2022-01-17 ENCOUNTER — TELEPHONE (OUTPATIENT)
Dept: MEDICAL GROUP | Facility: MEDICAL CENTER | Age: 67
End: 2022-01-17

## 2022-01-17 ENCOUNTER — PATIENT MESSAGE (OUTPATIENT)
Dept: MEDICAL GROUP | Facility: MEDICAL CENTER | Age: 67
End: 2022-01-17

## 2022-01-17 DIAGNOSIS — Z20.822 EXPOSURE TO COVID-19 VIRUS: ICD-10-CM

## 2022-01-17 PROBLEM — Z86.16 HISTORY OF COVID-19: Status: ACTIVE | Noted: 2022-01-17

## 2022-01-22 ENCOUNTER — TELEPHONE (OUTPATIENT)
Dept: MEDICAL GROUP | Facility: MEDICAL CENTER | Age: 67
End: 2022-01-22

## 2022-01-22 DIAGNOSIS — H93.19 TINNITUS, UNSPECIFIED LATERALITY: ICD-10-CM

## 2022-02-10 ENCOUNTER — HOSPITAL ENCOUNTER (OUTPATIENT)
Facility: MEDICAL CENTER | Age: 67
End: 2022-02-10
Attending: PHYSICIAN ASSISTANT
Payer: COMMERCIAL

## 2022-02-10 ENCOUNTER — OFFICE VISIT (OUTPATIENT)
Dept: MEDICAL GROUP | Facility: MEDICAL CENTER | Age: 67
End: 2022-02-10
Payer: COMMERCIAL

## 2022-02-10 VITALS
TEMPERATURE: 97.5 F | OXYGEN SATURATION: 98 % | DIASTOLIC BLOOD PRESSURE: 62 MMHG | SYSTOLIC BLOOD PRESSURE: 110 MMHG | HEART RATE: 83 BPM | BODY MASS INDEX: 25.66 KG/M2 | HEIGHT: 65 IN | WEIGHT: 154 LBS

## 2022-02-10 DIAGNOSIS — J02.9 PHARYNGITIS, UNSPECIFIED ETIOLOGY: ICD-10-CM

## 2022-02-10 DIAGNOSIS — R22.1 NECK FULLNESS: ICD-10-CM

## 2022-02-10 LAB
INT CON NEG: NEGATIVE
INT CON POS: POSITIVE
S PYO AG THROAT QL: NEGATIVE

## 2022-02-10 PROCEDURE — 87880 STREP A ASSAY W/OPTIC: CPT | Performed by: PHYSICIAN ASSISTANT

## 2022-02-10 PROCEDURE — 87070 CULTURE OTHR SPECIMN AEROBIC: CPT

## 2022-02-10 PROCEDURE — 99214 OFFICE O/P EST MOD 30 MIN: CPT | Performed by: PHYSICIAN ASSISTANT

## 2022-02-10 ASSESSMENT — FIBROSIS 4 INDEX: FIB4 SCORE: 1.13

## 2022-02-10 ASSESSMENT — PATIENT HEALTH QUESTIONNAIRE - PHQ9: CLINICAL INTERPRETATION OF PHQ2 SCORE: 0

## 2022-02-10 NOTE — ASSESSMENT & PLAN NOTE
Patient is a pleasant 66-year-old female here for persistent sore throat. She tested positive for Covid approximately month ago on January 13, 2022. At that time developed sore throat. States that she has had a persistent sore throat since then. Is able to tolerate liquids and solids but does note sensation that is sore. Does not have any trouble swallowing. Denies any fevers or chills or associated congestion or cough he denies postnasal drip. Has no known history of acid reflux. Is not a smoker

## 2022-02-10 NOTE — PROGRESS NOTES
"Subjective:   Adrienne Morales is a 66 y.o. female here today for sore throat    Pharyngitis  Patient is a pleasant 66-year-old female here for persistent sore throat. She tested positive for Covid approximately month ago on January 13, 2022. At that time developed sore throat. States that she has had a persistent sore throat since then. Is able to tolerate liquids and solids but does note sensation that is sore. Does not have any trouble swallowing. Denies any fevers or chills or associated congestion or cough he denies postnasal drip. Has no known history of acid reflux. Is not a smoker         Current medicines (including changes today)  Current Outpatient Medications   Medication Sig Dispense Refill   • sumatriptan (IMITREX) 100 MG tablet Take 1 Tablet by mouth one time as needed for Migraine (as needed once daily). TAKE 1 TABLET BY MOUTH ONCE AS NEEDED FOR MIGRAINE FOR UP TO 1 DOSE 10 Tablet 6   • Cholecalciferol (VITAMIN D) 2000 UNIT Tab Take  by mouth every day.     • Naproxen Sodium (ALEVE PO) Take  by mouth.     • Pramoxine HCl 1 % Lotion otc use as directed (Patient not taking: Reported on 2/15/2021) 1 Bottle 0     No current facility-administered medications for this visit.     She  has a past medical history of Diverticulosis of colon, Endometrial cancer (HCC) (3/10/10), Hemorrhoids, HSV-1 infection, Insomnia, Migraine, Osteopenia, Rotator cuff dysfunction, and Vitamin D deficiency.  Nkda [no known drug allergy]     Social History and Family History were reviewed and updated.    ROS   No headaches, chest pain, no shortness of breath, abdominal pain, nausea, or vomiting.  All other systems were reviewed and are negative or noted as positive in the HPI.       Objective:     /62 (BP Location: Right arm, Patient Position: Sitting, BP Cuff Size: Adult)   Pulse 83   Temp 36.4 °C (97.5 °F) (Temporal)   Ht 1.651 m (5' 5\")   Wt 69.9 kg (154 lb)   SpO2 98%  Body mass index is 25.63 kg/m².     Physical " Exam:  Constitutional: ANO x3, no acute distress, well-nourished, well-hydrated   Skin: Warm, dry, good turgor, no rashes in visible areas.  HEENT: Is normocephalic atraumatic, good PERRLA, extraocular movements intact, TMs and oropharynx are clear with good dentition.  Tonsils have atrophied.  There is no redness, erythema, or exudate.  Uvula is midline  Neck: Patient has bilateral neck fullness appreciated on exam.  May be bilateral anterior cervical lymphadenopathy versus thyroid megaly   cardiovascular: Regular rate and rhythm.  Normal S1 and 2, no murmurs, rubs or gallops.  No edema noted  Lungs: Clear to auscultation bilaterally.  No wheezes, rales or rhonchi.  Good inspiratory and expiratory breath sounds  Abdomen: Soft, non-tender, no masses.  No hepatosplenomegaly.  Negative Barrera's  Psych: Alert and oriented x3, normal affect and mood.  Neuro: Cranial nerves II through XII were assessed and intact.  Normal gait, normal cerebellar function noted  Musculoskeletal: Full range of motion, good strength against resistance    Clinical Course/Lab Analysis:        Assessment and Plan:   The following treatment plan was discussed.  Signs and symptoms for which to return were discussed with patient at length.  Patient verbalized understanding.    1. Pharyngitis, unspecified etiology  New and unstable see below  - POCT Rapid Strep A  - CULTURE THROAT; Future  - Referral to ENT    2. Neck fullness  New and unstable  - US-SOFT TISSUES OF HEAD - NECK; Future  - Referral to ENT     Both conditions are new and unstable.  Patient had recent diagnosis of COVID about a month ago so it is unclear if she just has a reactive lymphadenopathy going on.  Throat culture was sent and is pending.  She may also have enlarged thyroid.  Giving her referral to ENT as well as doing soft tissue of the head and neck to characterize further    Followup: 2-4 weeks    Please note that this dictation was created using voice recognition  software. I have made every reasonable attempt to correct obvious errors, but I expect that there are errors of grammar and possibly content that I did not discover before finalizing the note.

## 2022-02-11 DIAGNOSIS — J02.9 PHARYNGITIS, UNSPECIFIED ETIOLOGY: ICD-10-CM

## 2022-02-13 LAB
BACTERIA SPEC RESP CULT: NORMAL
SIGNIFICANT IND 70042: NORMAL
SITE SITE: NORMAL
SOURCE SOURCE: NORMAL

## 2022-02-15 DIAGNOSIS — E04.1 LEFT THYROID NODULE: ICD-10-CM

## 2022-02-19 PROBLEM — J02.9 PHARYNGITIS: Status: RESOLVED | Noted: 2022-02-10 | Resolved: 2022-02-19

## 2022-03-05 DIAGNOSIS — E04.1 THYROID NODULE: ICD-10-CM

## 2022-03-24 ENCOUNTER — APPOINTMENT (RX ONLY)
Dept: URBAN - METROPOLITAN AREA CLINIC 4 | Facility: CLINIC | Age: 67
Setting detail: DERMATOLOGY
End: 2022-03-24

## 2022-03-24 DIAGNOSIS — D18.0 HEMANGIOMA: ICD-10-CM

## 2022-03-24 DIAGNOSIS — L81.4 OTHER MELANIN HYPERPIGMENTATION: ICD-10-CM

## 2022-03-24 DIAGNOSIS — L44.8 OTHER SPECIFIED PAPULOSQUAMOUS DISORDERS: ICD-10-CM

## 2022-03-24 DIAGNOSIS — Z71.89 OTHER SPECIFIED COUNSELING: ICD-10-CM

## 2022-03-24 DIAGNOSIS — D22 MELANOCYTIC NEVI: ICD-10-CM

## 2022-03-24 DIAGNOSIS — L82.1 OTHER SEBORRHEIC KERATOSIS: ICD-10-CM

## 2022-03-24 DIAGNOSIS — L30.8 OTHER SPECIFIED DERMATITIS: ICD-10-CM | Status: WELL CONTROLLED

## 2022-03-24 PROBLEM — D48.5 NEOPLASM OF UNCERTAIN BEHAVIOR OF SKIN: Status: ACTIVE | Noted: 2022-03-24

## 2022-03-24 PROBLEM — D22.5 MELANOCYTIC NEVI OF TRUNK: Status: ACTIVE | Noted: 2022-03-24

## 2022-03-24 PROBLEM — D18.01 HEMANGIOMA OF SKIN AND SUBCUTANEOUS TISSUE: Status: ACTIVE | Noted: 2022-03-24

## 2022-03-24 PROCEDURE — ? COUNSELING

## 2022-03-24 PROCEDURE — ? OBSERVATION

## 2022-03-24 PROCEDURE — ? PHOTO-DOCUMENTATION

## 2022-03-24 PROCEDURE — ? DIAGNOSIS COMMENT

## 2022-03-24 PROCEDURE — 99203 OFFICE O/P NEW LOW 30 MIN: CPT

## 2022-03-24 ASSESSMENT — LOCATION DETAILED DESCRIPTION DERM
LOCATION DETAILED: RIGHT ANTERIOR DISTAL THIGH
LOCATION DETAILED: RIGHT VENTRAL PROXIMAL FOREARM
LOCATION DETAILED: RIGHT SUPERIOR UPPER BACK
LOCATION DETAILED: LEFT CENTRAL MALAR CHEEK
LOCATION DETAILED: LEFT ANTERIOR DISTAL THIGH
LOCATION DETAILED: LEFT ANTERIOR PROXIMAL UPPER ARM
LOCATION DETAILED: SUPERIOR LUMBAR SPINE
LOCATION DETAILED: LEFT PROXIMAL CALF
LOCATION DETAILED: RIGHT INFERIOR MEDIAL MIDBACK
LOCATION DETAILED: RIGHT SUPERIOR MEDIAL UPPER BACK
LOCATION DETAILED: LEFT INFERIOR MEDIAL MIDBACK
LOCATION DETAILED: RIGHT PROXIMAL CALF
LOCATION DETAILED: RIGHT CENTRAL MALAR CHEEK
LOCATION DETAILED: LEFT DISTAL POSTERIOR UPPER ARM
LOCATION DETAILED: EPIGASTRIC SKIN
LOCATION DETAILED: RIGHT PROXIMAL POSTERIOR UPPER ARM
LOCATION DETAILED: RIGHT RADIAL DORSAL HAND
LOCATION DETAILED: UPPER STERNUM
LOCATION DETAILED: LEFT RADIAL DORSAL HAND
LOCATION DETAILED: RIGHT SUPERIOR MEDIAL MALAR CHEEK
LOCATION DETAILED: RIGHT ANTERIOR PROXIMAL UPPER ARM
LOCATION DETAILED: LEFT MID-UPPER BACK
LOCATION DETAILED: RIGHT MID-UPPER BACK
LOCATION DETAILED: LEFT VENTRAL PROXIMAL FOREARM

## 2022-03-24 ASSESSMENT — LOCATION SIMPLE DESCRIPTION DERM
LOCATION SIMPLE: LOWER BACK
LOCATION SIMPLE: RIGHT LOWER BACK
LOCATION SIMPLE: LEFT UPPER BACK
LOCATION SIMPLE: LEFT FOREARM
LOCATION SIMPLE: RIGHT HAND
LOCATION SIMPLE: RIGHT CHEEK
LOCATION SIMPLE: ABDOMEN
LOCATION SIMPLE: CHEST
LOCATION SIMPLE: LEFT POSTERIOR UPPER ARM
LOCATION SIMPLE: RIGHT POSTERIOR UPPER ARM
LOCATION SIMPLE: LEFT UPPER ARM
LOCATION SIMPLE: RIGHT FOREARM
LOCATION SIMPLE: LEFT THIGH
LOCATION SIMPLE: LEFT LOWER BACK
LOCATION SIMPLE: RIGHT THIGH
LOCATION SIMPLE: LEFT CHEEK
LOCATION SIMPLE: LEFT CALF
LOCATION SIMPLE: RIGHT UPPER ARM
LOCATION SIMPLE: RIGHT CALF
LOCATION SIMPLE: RIGHT UPPER BACK
LOCATION SIMPLE: LEFT HAND

## 2022-03-24 ASSESSMENT — LOCATION ZONE DERM
LOCATION ZONE: ARM
LOCATION ZONE: LEG
LOCATION ZONE: TRUNK
LOCATION ZONE: FACE
LOCATION ZONE: HAND

## 2022-03-24 NOTE — PROCEDURE: DIAGNOSIS COMMENT
Render Risk Assessment In Note?: no
Comment: keratotic component with pseudocysts. Monitor for changes.
Detail Level: Simple
Comment: Patient has previously prescribed topical steroids she uses for flares.

## 2022-03-24 NOTE — PROCEDURE: MIPS QUALITY
Quality 111:Pneumonia Vaccination Status For Older Adults: Pneumococcal Vaccination not Administered or Previously Received, Reason not Otherwise Specified
Quality 226: Preventive Care And Screening: Tobacco Use: Screening And Cessation Intervention: Patient screened for tobacco use and is an ex/non-smoker
Quality 431: Preventive Care And Screening: Unhealthy Alcohol Use - Screening: Patient screened for unhealthy alcohol use using a single question and scores less than 2 times per year
Detail Level: Detailed
Quality 130: Documentation Of Current Medications In The Medical Record: Current Medications Documented

## 2022-04-22 ENCOUNTER — HOSPITAL ENCOUNTER (OUTPATIENT)
Dept: RADIOLOGY | Facility: MEDICAL CENTER | Age: 67
End: 2022-04-22
Payer: COMMERCIAL

## 2022-04-26 PROBLEM — R22.1 NECK FULLNESS: Status: RESOLVED | Noted: 2022-02-10 | Resolved: 2022-04-26

## 2022-08-03 ENCOUNTER — APPOINTMENT (RX ONLY)
Dept: URBAN - METROPOLITAN AREA CLINIC 6 | Facility: CLINIC | Age: 67
Setting detail: DERMATOLOGY
End: 2022-08-03

## 2022-08-03 DIAGNOSIS — L82.1 OTHER SEBORRHEIC KERATOSIS: ICD-10-CM

## 2022-08-03 DIAGNOSIS — L30.8 OTHER SPECIFIED DERMATITIS: ICD-10-CM | Status: INADEQUATELY CONTROLLED

## 2022-08-03 PROCEDURE — ? MEDICATION COUNSELING

## 2022-08-03 PROCEDURE — ? DIAGNOSIS COMMENT

## 2022-08-03 PROCEDURE — 99214 OFFICE O/P EST MOD 30 MIN: CPT

## 2022-08-03 PROCEDURE — ? COUNSELING

## 2022-08-03 PROCEDURE — ? PRESCRIPTION

## 2022-08-03 RX ORDER — TACROLIMUS 1 MG/G
1 OINTMENT TOPICAL
Qty: 60 | Refills: 3 | Status: ERX | COMMUNITY
Start: 2022-08-03

## 2022-08-03 RX ORDER — FLUOCINONIDE 0.5 MG/G
OINTMENT TOPICAL
Qty: 60 | Refills: 2 | Status: ERX | COMMUNITY
Start: 2022-08-03

## 2022-08-03 RX ADMIN — FLUOCINONIDE 1: 0.5 OINTMENT TOPICAL at 00:00

## 2022-08-03 RX ADMIN — TACROLIMUS 1: 1 OINTMENT TOPICAL at 00:00

## 2022-08-03 ASSESSMENT — LOCATION ZONE DERM
LOCATION ZONE: TRUNK
LOCATION ZONE: FACE
LOCATION ZONE: HAND

## 2022-08-03 ASSESSMENT — LOCATION DETAILED DESCRIPTION DERM
LOCATION DETAILED: LEFT RADIAL DORSAL HAND
LOCATION DETAILED: RIGHT INFERIOR MEDIAL MIDBACK
LOCATION DETAILED: RIGHT RADIAL DORSAL HAND
LOCATION DETAILED: LEFT INFERIOR MEDIAL MIDBACK
LOCATION DETAILED: RIGHT SUPERIOR MEDIAL MALAR CHEEK
LOCATION DETAILED: RIGHT SUPERIOR MEDIAL MIDBACK

## 2022-08-03 ASSESSMENT — LOCATION SIMPLE DESCRIPTION DERM
LOCATION SIMPLE: RIGHT CHEEK
LOCATION SIMPLE: RIGHT HAND
LOCATION SIMPLE: LEFT HAND
LOCATION SIMPLE: RIGHT LOWER BACK
LOCATION SIMPLE: LEFT LOWER BACK

## 2022-08-03 NOTE — PROCEDURE: MEDICATION COUNSELING
5-Fu Counseling: 5-Fluorouracil Counseling:  I discussed with the patient the risks of 5-fluorouracil including but not limited to erythema, scaling, itching, weeping, crusting, and pain.
Itraconazole Counseling:  I discussed with the patient the risks of itraconazole including but not limited to liver damage, nausea/vomiting, neuropathy, and severe allergy.  The patient understands that this medication is best absorbed when taken with acidic beverages such as non-diet cola or ginger ale.  The patient understands that monitoring is required including baseline LFTs and repeat LFTs at intervals.  The patient understands that they are to contact us or the primary physician if concerning signs are noted.
Rifampin Pregnancy And Lactation Text: This medication is Pregnancy Category C and it isn't know if it is safe during pregnancy. It is also excreted in breast milk and should not be used if you are breast feeding.
Ivermectin Pregnancy And Lactation Text: This medication is Pregnancy Category C and it isn't known if it is safe during pregnancy. It is also excreted in breast milk.
Oxybutynin Counseling:  I discussed with the patient the risks of oxybutynin including but not limited to skin rash, drowsiness, dry mouth, difficulty urinating, and blurred vision.
Cosentyx Pregnancy And Lactation Text: This medication is Pregnancy Category B and is considered safe during pregnancy. It is unknown if this medication is excreted in breast milk.
Topical Ketoconazole Pregnancy And Lactation Text: This medication is Pregnancy Category B and is considered safe during pregnancy. It is unknown if it is excreted in breast milk.
Doxepin Pregnancy And Lactation Text: This medication is Pregnancy Category C and it isn't known if it is safe during pregnancy. It is also excreted in breast milk and breast feeding isn't recommended.
Tremfya Pregnancy And Lactation Text: The risk during pregnancy and breastfeeding is uncertain with this medication.
Azithromycin Counseling:  I discussed with the patient the risks of azithromycin including but not limited to GI upset, allergic reaction, drug rash, diarrhea, and yeast infections.
Hydroquinone Pregnancy And Lactation Text: This medication has not been assigned a Pregnancy Risk Category but animal studies failed to show danger with the topical medication. It is unknown if the medication is excreted in breast milk.
Niacinamide Pregnancy And Lactation Text: These medications are considered safe during pregnancy.
Rhofade Pregnancy And Lactation Text: This medication has not been assigned a Pregnancy Risk Category. It is unknown if the medication is excreted in breast milk.
Opzelura Counseling:  I discussed with the patient the risks of Opzelura including but not limited to nasopharngitis, bronchitis, ear infection, eosinophila, hives, diarrhea, folliculitis, tonsillitis, and rhinorrhea.  Taken orally, this medication has been linked to serious infections; higher rate of mortality; malignancy and lymphoproliferative disorders; major adverse cardiovascular events; thrombosis; thrombocytopenia, anemia, and neutropenia; and lipid elevations.
Gabapentin Counseling: I discussed with the patient the risks of gabapentin including but not limited to dizziness, somnolence, fatigue and ataxia.
Cyclophosphamide Counseling:  I discussed with the patient the risks of cyclophosphamide including but not limited to hair loss, hormonal abnormalities, decreased fertility, abdominal pain, diarrhea, nausea and vomiting, bone marrow suppression and infection. The patient understands that monitoring is required while taking this medication.
Simponi Counseling:  I discussed with the patient the risks of golimumab including but not limited to myelosuppression, immunosuppression, autoimmune hepatitis, demyelinating diseases, lymphoma, and serious infections.  The patient understands that monitoring is required including a PPD at baseline and must alert us or the primary physician if symptoms of infection or other concerning signs are noted.
Topical Ketoconazole Counseling: Patient counseled that this medication may cause skin irritation or allergic reactions.  In the event of skin irritation, the patient was advised to reduce the amount of the drug applied or use it less frequently.   The patient verbalized understanding of the proper use and possible adverse effects of ketoconazole.  All of the patient's questions and concerns were addressed.
Acitretin Pregnancy And Lactation Text: This medication is Pregnancy Category X and should not be given to women who are pregnant or may become pregnant in the future. This medication is excreted in breast milk.
Thalidomide Counseling: I discussed with the patient the risks of thalidomide including but not limited to birth defects, anxiety, weakness, chest pain, dizziness, cough and severe allergy.
Azelaic Acid Counseling: Patient counseled that medicine may cause skin irritation and to avoid applying near the eyes.  In the event of skin irritation, the patient was advised to reduce the amount of the drug applied or use it less frequently.   The patient verbalized understanding of the proper use and possible adverse effects of azelaic acid.  All of the patient's questions and concerns were addressed.
Doxycycline Pregnancy And Lactation Text: This medication is Pregnancy Category D and not consider safe during pregnancy. It is also excreted in breast milk but is considered safe for shorter treatment courses.
Griseofulvin Pregnancy And Lactation Text: This medication is Pregnancy Category X and is known to cause serious birth defects. It is unknown if this medication is excreted in breast milk but breast feeding should be avoided.
Cantharidin Pregnancy And Lactation Text: The use of this medication during pregnancy or lactation is not recommended as there is insufficient data.
Rifampin Counseling: I discussed with the patient the risks of rifampin including but not limited to liver damage, kidney damage, red-orange body fluids, nausea/vomiting and severe allergy.
Colchicine Pregnancy And Lactation Text: This medication is Pregnancy Category C and isn't considered safe during pregnancy. It is excreted in breast milk.
Topical Sulfur Applications Counseling: Topical Sulfur Counseling: Patient counseled that this medication may cause skin irritation or allergic reactions.  In the event of skin irritation, the patient was advised to reduce the amount of the drug applied or use it less frequently.   The patient verbalized understanding of the proper use and possible adverse effects of topical sulfur application.  All of the patient's questions and concerns were addressed.
Hydroxyzine Counseling: Patient advised that the medication is sedating and not to drive a car after taking this medication.  Patient informed of potential adverse effects including but not limited to dry mouth, urinary retention, and blurry vision.  The patient verbalized understanding of the proper use and possible adverse effects of hydroxyzine.  All of the patient's questions and concerns were addressed.
Xeljanz Counseling: I discussed with the patient the risks of Xeljanz therapy including increased risk of infection, liver issues, headache, diarrhea, or cold symptoms. Live vaccines should be avoided. They were instructed to call if they have any problems.
Dupixent Counseling: I discussed with the patient the risks of dupilumab including but not limited to eye infection and irritation, cold sores, injection site reactions, worsening of asthma, allergic reactions and increased risk of parasitic infection.  Live vaccines should be avoided while taking dupilumab. Dupilumab will also interact with certain medications such as warfarin and cyclosporine. The patient understands that monitoring is required and they must alert us or the primary physician if symptoms of infection or other concerning signs are noted.
Hydroquinone Counseling:  Patient advised that medication may result in skin irritation, lightening (hypopigmentation), dryness, and burning.  In the event of skin irritation, the patient was advised to reduce the amount of the drug applied or use it less frequently.  Rarely, spots that are treated with hydroquinone can become darker (pseudoochronosis).  Should this occur, patient instructed to stop medication and call the office. The patient verbalized understanding of the proper use and possible adverse effects of hydroquinone.  All of the patient's questions and concerns were addressed.
Nsaids Counseling: NSAID Counseling: I discussed with the patient that NSAIDs should be taken with food. Prolonged use of NSAIDs can result in the development of stomach ulcers.  Patient advised to stop taking NSAIDs if abdominal pain occurs.  The patient verbalized understanding of the proper use and possible adverse effects of NSAIDs.  All of the patient's questions and concerns were addressed.
Rhofade Counseling: Rhofade is a topical medication which can decrease superficial blood flow where applied. Side effects are uncommon and include stinging, redness and allergic reactions.
Cyclophosphamide Pregnancy And Lactation Text: This medication is Pregnancy Category D and it isn't considered safe during pregnancy. This medication is excreted in breast milk.
Bexarotene Counseling:  I discussed with the patient the risks of bexarotene including but not limited to hair loss, dry lips/skin/eyes, liver abnormalities, hyperlipidemia, pancreatitis, depression/suicidal ideation, photosensitivity, drug rash/allergic reactions, hypothyroidism, anemia, leukopenia, infection, cataracts, and teratogenicity.  Patient understands that they will need regular blood tests to check lipid profile, liver function tests, white blood cell count, thyroid function tests and pregnancy test if applicable.
Aklief Pregnancy And Lactation Text: It is unknown if this medication is safe to use during pregnancy.  It is unknown if this medication is excreted in breast milk.  Breastfeeding women should use the topical cream on the smallest area of the skin for the shortest time needed while breastfeeding.  Do not apply to nipple and areola.
Doxycycline Counseling:  Patient counseled regarding possible photosensitivity and increased risk for sunburn.  Patient instructed to avoid sunlight, if possible.  When exposed to sunlight, patients should wear protective clothing, sunglasses, and sunscreen.  The patient was instructed to call the office immediately if the following severe adverse effects occur:  hearing changes, easy bruising/bleeding, severe headache, or vision changes.  The patient verbalized understanding of the proper use and possible adverse effects of doxycycline.  All of the patient's questions and concerns were addressed.
Thalidomide Pregnancy And Lactation Text: This medication is Pregnancy Category X and is absolutely contraindicated during pregnancy. It is unknown if it is excreted in breast milk.
Olumiant Counseling: I discussed with the patient the risks of Olumiant therapy including but not limited to upper respiratory tract infections, shingles, cold sores, and nausea. Live vaccines should be avoided.  This medication has been linked to serious infections; higher rate of mortality; malignancy and lymphoproliferative disorders; major adverse cardiovascular events; thrombosis; gastrointestinal perforations; neutropenia; lymphopenia; anemia; liver enzyme elevations; and lipid elevations.
Cantharidin Counseling: Calcipotriene Counseling:  I discussed with the patient the risks of calcipotriene including but not limited to erythema, scaling, itching, and irritation.
Griseofulvin Counseling:  I discussed with the patient the risks of griseofulvin including but not limited to photosensitivity, cytopenia, liver damage, nausea/vomiting and severe allergy.  The patient understands that this medication is best absorbed when taken with a fatty meal (e.g., ice cream or french fries).
Propranolol Counseling:  I discussed with the patient the risks of propranolol including but not limited to low heart rate, low blood pressure, low blood sugar, restlessness and increased cold sensitivity. They should call the office if they experience any of these side effects.
Dapsone Counseling: I discussed with the patient the risks of dapsone including but not limited to hemolytic anemia, agranulocytosis, rashes, methemoglobinemia, kidney failure, peripheral neuropathy, headaches, GI upset, and liver toxicity.  Patients who start dapsone require monitoring including baseline LFTs and weekly CBCs for the first month, then every month thereafter.  The patient verbalized understanding of the proper use and possible adverse effects of dapsone.  All of the patient's questions and concerns were addressed.
Topical Sulfur Applications Pregnancy And Lactation Text: This medication is Pregnancy Category C and has an unknown safety profile during pregnancy. It is unknown if this topical medication is excreted in breast milk.
Quinolones Pregnancy And Lactation Text: This medication is Pregnancy Category C and it isn't know if it is safe during pregnancy. It is also excreted in breast milk.
Dupixent Pregnancy And Lactation Text: This medication likely crosses the placenta but the risk for the fetus is uncertain. This medication is excreted in breast milk.
Xelimguelz Pregnancy And Lactation Text: This medication is Pregnancy Category D and is not considered safe during pregnancy.  The risk during breast feeding is also uncertain.
Nsaids Pregnancy And Lactation Text: These medications are considered safe up to 30 weeks gestation. It is excreted in breast milk.
Qbrexza Pregnancy And Lactation Text: There is no available data on Qbrexza use in pregnant women.  There is no available data on Qbrexza use in lactation.
Adbry Counseling: I discussed with the patient the risks of tralokinumab including but not limited to eye infection and irritation, cold sores, injection site reactions, worsening of asthma, allergic reactions and increased risk of parasitic infection.  Live vaccines should be avoided while taking tralokinumab. The patient understands that monitoring is required and they must alert us or the primary physician if symptoms of infection or other concerning signs are noted.
Mirvaso Counseling: Mirvaso is a topical medication which can decrease superficial blood flow where applied. Side effects are uncommon and include stinging, redness and allergic reactions.
Cyclosporine Counseling:  I discussed with the patient the risks of cyclosporine including but not limited to hypertension, gingival hyperplasia,myelosuppression, immunosuppression, liver damage, kidney damage, neurotoxicity, lymphoma, and serious infections. The patient understands that monitoring is required including baseline blood pressure, CBC, CMP, lipid panel and uric acid, and then 1-2 times monthly CMP and blood pressure.
Glycopyrrolate Counseling:  I discussed with the patient the risks of glycopyrrolate including but not limited to skin rash, drowsiness, dry mouth, difficulty urinating, and blurred vision.
Aklief counseling:  Patient advised to apply a pea-sized amount only at bedtime and wait 30 minutes after washing their face before applying.  If too drying, patient may add a non-comedogenic moisturizer.  The most commonly reported side effects including irritation, redness, scaling, dryness, stinging, burning, itching, and increased risk of sunburn.  The patient verbalized understanding of the proper use and possible adverse effects of retinoids.  All of the patient's questions and concerns were addressed.
Opioid Counseling: I discussed with the patient the potential side effects of opioids including but not limited to addiction, altered mental status, and depression. I stressed avoiding alcohol, benzodiazepines, muscle relaxants and sleep aids unless specifically okayed by a physician. The patient verbalized understanding of the proper use and possible adverse effects of opioids. All of the patient's questions and concerns were addressed. They were instructed to flush the remaining pills down the toilet if they did not need them for pain.
Skyrizi Counseling: I discussed with the patient the risks of risankizumab-rzaa including but not limited to immunosuppression, and serious infections.  The patient understands that monitoring is required including a PPD at baseline and must alert us or the primary physician if symptoms of infection or other concerning signs are noted.
Clindamycin Pregnancy And Lactation Text: This medication can be used in pregnancy if certain situations. Clindamycin is also present in breast milk.
Tranexamic Acid Counseling:  Patient advised of the small risk of bleeding problems with tranexamic acid. They were also instructed to call if they developed any nausea, vomiting or diarrhea. All of the patient's questions and concerns were addressed.
Bexarotene Pregnancy And Lactation Text: This medication is Pregnancy Category X and should not be given to women who are pregnant or may become pregnant. This medication should not be used if you are breast feeding.
Topical Clindamycin Counseling: Patient counseled that this medication may cause skin irritation or allergic reactions.  In the event of skin irritation, the patient was advised to reduce the amount of the drug applied or use it less frequently.   The patient verbalized understanding of the proper use and possible adverse effects of clindamycin.  All of the patient's questions and concerns were addressed.
Calcipotriene Pregnancy And Lactation Text: This medication has not been proven safe during pregnancy. It is unknown if this medication is excreted in breast milk.
Propranolol Pregnancy And Lactation Text: This medication is Pregnancy Category C and it isn't known if it is safe during pregnancy. It is excreted in breast milk.
Dapsone Pregnancy And Lactation Text: This medication is Pregnancy Category C and is not considered safe during pregnancy or breast feeding.
Olumiant Pregnancy And Lactation Text: Based on animal studies, Olumiant may cause embryo-fetal harm when administered to pregnant women.  The medication should not be used in pregnancy.  Breastfeeding is not recommended during treatment.
Wartpeel Counseling:  I discussed with the patient the risks of Wartpeel including but not limited to erythema, scaling, itching, weeping, crusting, and pain.
Quinolones Counseling:  I discussed with the patient the risks of fluoroquinolones including but not limited to GI upset, allergic reaction, drug rash, diarrhea, dizziness, photosensitivity, yeast infections, liver function test abnormalities, tendonitis/tendon rupture.
Enbrel Counseling:  I discussed with the patient the risks of etanercept including but not limited to myelosuppression, immunosuppression, autoimmune hepatitis, demyelinating diseases, lymphoma, and infections.  The patient understands that monitoring is required including a PPD at baseline and must alert us or the primary physician if symptoms of infection or other concerning signs are noted.
Eucrisa Counseling: Patient may experience a mild burning sensation during topical application. Eucrisa is not approved in children less than 2 years of age.
Odomzo Counseling- I discussed with the patient the risks of Odomzo including but not limited to nausea, vomiting, diarrhea, constipation, weight loss, changes in the sense of taste, decreased appetite, muscle spasms, and hair loss.  The patient verbalized understanding of the proper use and possible adverse effects of Odomzo.  All of the patient's questions and concerns were addressed.
Xolair Counseling:  Patient informed of potential adverse effects including but not limited to fever, muscle aches, rash and allergic reactions.  The patient verbalized understanding of the proper use and possible adverse effects of Xolair.  All of the patient's questions and concerns were addressed.
Qbrexza Counseling:  I discussed with the patient the risks of Qbrexza including but not limited to headache, mydriasis, blurred vision, dry eyes, nasal dryness, dry mouth, dry throat, dry skin, urinary hesitation, and constipation.  Local skin reactions including erythema, burning, stinging, and itching can also occur.
Adbry Pregnancy And Lactation Text: It is unknown if this medication will adversely affect pregnancy or breast feeding.
Cyclosporine Pregnancy And Lactation Text: This medication is Pregnancy Category C and it isn't know if it is safe during pregnancy. This medication is excreted in breast milk.
Include Pregnancy/Lactation Warning?: No
Glycopyrrolate Pregnancy And Lactation Text: This medication is Pregnancy Category B and is considered safe during pregnancy. It is unknown if it is excreted breast milk.
Tranexamic Acid Pregnancy And Lactation Text: It is unknown if this medication is safe during pregnancy or breast feeding.
Clindamycin Counseling: I counseled the patient regarding use of clindamycin as an antibiotic for prophylactic and/or therapeutic purposes. Clindamycin is active against numerous classes of bacteria, including skin bacteria. Side effects may include nausea, diarrhea, gastrointestinal upset, rash, hives, yeast infections, and in rare cases, colitis.
Tazorac Pregnancy And Lactation Text: This medication is not safe during pregnancy. It is unknown if this medication is excreted in breast milk.
Fluconazole Counseling:  Patient counseled regarding adverse effects of fluconazole including but not limited to headache, diarrhea, nausea, upset stomach, liver function test abnormalities, taste disturbance, and stomach pain.  There is a rare possibility of liver failure that can occur when taking fluconazole.  The patient understands that monitoring of LFTs and kidney function test may be required, especially at baseline. The patient verbalized understanding of the proper use and possible adverse effects of fluconazole.  All of the patient's questions and concerns were addressed.
Opioid Pregnancy And Lactation Text: These medications can lead to premature delivery and should be avoided during pregnancy. These medications are also present in breast milk in small amounts.
Dutasteride Male Counseling: Dustasteride Counseling:  I discussed with the patient the risks of use of dutasteride including but not limited to decreased libido, decreased ejaculate volume, and gynecomastia. Women who can become pregnant should not handle medication.  All of the patient's questions and concerns were addressed.
Rinvoq Counseling: I discussed with the patient the risks of Rinvoq therapy including but not limited to upper respiratory tract infections, shingles, cold sores, bronchitis, nausea, cough, fever, acne, and headache. Live vaccines should be avoided.  This medication has been linked to serious infections; higher rate of mortality; malignancy and lymphoproliferative disorders; major adverse cardiovascular events; thrombosis; thrombocytopenia, anemia, and neutropenia; lipid elevations; liver enzyme elevations; and gastrointestinal perforations.
Terbinafine Counseling: Patient counseling regarding adverse effects of terbinafine including but not limited to headache, diarrhea, rash, upset stomach, liver function test abnormalities, itching, taste/smell disturbance, nausea, abdominal pain, and flatulence.  There is a rare possibility of liver failure that can occur when taking terbinafine.  The patient understands that a baseline LFT and kidney function test may be required. The patient verbalized understanding of the proper use and possible adverse effects of terbinafine.  All of the patient's questions and concerns were addressed.
Wartpeel Pregnancy And Lactation Text: This medication is Pregnancy Category X and contraindicated in pregnancy and in women who may become pregnant. It is unknown if this medication is excreted in breast milk.
Minocycline Pregnancy And Lactation Text: This medication is Pregnancy Category D and not consider safe during pregnancy. It is also excreted in breast milk.
Birth Control Pills Counseling: Birth Control Pill Counseling: I discussed with the patient the potential side effects of OCPs including but not limited to increased risk of stroke, heart attack, thrombophlebitis, deep venous thrombosis, hepatic adenomas, breast changes, GI upset, headaches, and depression.  The patient verbalized understanding of the proper use and possible adverse effects of OCPs. All of the patient's questions and concerns were addressed.
Elidel Pregnancy And Lactation Text: This medication is Pregnancy Category C. It is unknown if this medication is excreted in breast milk.
Protopic Pregnancy And Lactation Text: This medication is Pregnancy Category C. It is unknown if this medication is excreted in breast milk when applied topically.
Xolair Pregnancy And Lactation Text: This medication is Pregnancy Category B and is considered safe during pregnancy. This medication is excreted in breast milk.
Methotrexate Counseling:  Patient counseled regarding adverse effects of methotrexate including but not limited to nausea, vomiting, abnormalities in liver function tests. Patients may develop mouth sores, rash, diarrhea, and abnormalities in blood counts. The patient understands that monitoring is required including LFT's and blood counts.  There is a rare possibility of scarring of the liver and lung problems that can occur when taking methotrexate. Persistent nausea, loss of appetite, pale stools, dark urine, cough, and shortness of breath should be reported immediately. Patient advised to discontinue methotrexate treatment at least three months before attempting to become pregnant.  I discussed the need for folate supplements while taking methotrexate.  These supplements can decrease side effects during methotrexate treatment. The patient verbalized understanding of the proper use and possible adverse effects of methotrexate.  All of the patient's questions and concerns were addressed.
Stelara Counseling:  I discussed with the patient the risks of ustekinumab including but not limited to immunosuppression, malignancy, posterior leukoencephalopathy syndrome, and serious infections.  The patient understands that monitoring is required including a PPD at baseline and must alert us or the primary physician if symptoms of infection or other concerning signs are noted.
Cibinqo Counseling: I discussed with the patient the risks of Cibinqo therapy including but not limited to common cold, nausea, headache, cold sores, increased blood CPK levels, dizziness, UTIs, fatigue, acne, and vomitting. Live vaccines should be avoided.  This medication has been linked to serious infections; higher rate of mortality; malignancy and lymphoproliferative disorders; major adverse cardiovascular events; thrombosis; thrombocytopenia and lymphopenia; lipid elevations; and retinal detachment.
Valtrex Counseling: I discussed with the patient the risks of valacyclovir including but not limited to kidney damage, nausea, vomiting and severe allergy.  The patient understands that if the infection seems to be worsening or is not improving, they are to call.
Minoxidil Counseling: Minoxidil is a topical medication which can increase blood flow where it is applied. It is uncertain how this medication increases hair growth. Side effects are uncommon and include stinging and allergic reactions.
Hydroxychloroquine Counseling:  I discussed with the patient that a baseline ophthalmologic exam is needed at the start of therapy and every year thereafter while on therapy. A CBC may also be warranted for monitoring.  The side effects of this medication were discussed with the patient, including but not limited to agranulocytosis, aplastic anemia, seizures, rashes, retinopathy, and liver toxicity. Patient instructed to call the office should any adverse effect occur.  The patient verbalized understanding of the proper use and possible adverse effects of Plaquenil.  All the patient's questions and concerns were addressed.
Detail Level: Simple
Tazorac Counseling:  Patient advised that medication is irritating and drying.  Patient may need to apply sparingly and wash off after an hour before eventually leaving it on overnight.  The patient verbalized understanding of the proper use and possible adverse effects of tazorac.  All of the patient's questions and concerns were addressed.
Hydroxyzine Pregnancy And Lactation Text: This medication is not safe during pregnancy and should not be taken. It is also excreted in breast milk and breast feeding isn't recommended.
Dutasteride Pregnancy And Lactation Text: This medication is absolutely contraindicated in women, especially during pregnancy and breast feeding. Feminization of male fetuses is possible if taking while pregnant.
High Dose Vitamin A Pregnancy And Lactation Text: High dose vitamin A therapy is contraindicated during pregnancy and breast feeding.
Cephalexin Pregnancy And Lactation Text: This medication is Pregnancy Category B and considered safe during pregnancy.  It is also excreted in breast milk but can be used safely for shorter doses.
Terbinafine Pregnancy And Lactation Text: This medication is Pregnancy Category B and is considered safe during pregnancy. It is also excreted in breast milk and breast feeding isn't recommended.
Rinvoq Pregnancy And Lactation Text: Based on animal studies, Rinvoq may cause embryo-fetal harm when administered to pregnant women.  The medication should not be used in pregnancy.  Breastfeeding is not recommended during treatment and for 6 days after the last dose.
Birth Control Pills Pregnancy And Lactation Text: This medication should be avoided if pregnant and for the first 30 days post-partum.
Minocycline Counseling: Patient advised regarding possible photosensitivity and discoloration of the teeth, skin, lips, tongue and gums.  Patient instructed to avoid sunlight, if possible.  When exposed to sunlight, patients should wear protective clothing, sunglasses, and sunscreen.  The patient was instructed to call the office immediately if the following severe adverse effects occur:  hearing changes, easy bruising/bleeding, severe headache, or vision changes.  The patient verbalized understanding of the proper use and possible adverse effects of minocycline.  All of the patient's questions and concerns were addressed.
Humira Counseling:  I discussed with the patient the risks of adalimumab including but not limited to myelosuppression, immunosuppression, autoimmune hepatitis, demyelinating diseases, lymphoma, and serious infections.  The patient understands that monitoring is required including a PPD at baseline and must alert us or the primary physician if symptoms of infection or other concerning signs are noted.
Elidel Counseling: Patient may experience a mild burning sensation during topical application. Elidel is not approved in children less than 2 years of age. There have been case reports of hematologic and skin malignancies in patients using topical calcineurin inhibitors although causality is questionable.
Winlevi Counseling:  I discussed with the patient the risks of topical clascoterone including but not limited to erythema, scaling, itching, and stinging. Patient voiced their understanding.
Oral Minoxidil Counseling- I discussed with the patient the risks of oral minoxidil including but not limited to shortness of breath, swelling of the feet or ankles, dizziness, lightheadedness, unwanted hair growth and allergic reaction.  The patient verbalized understanding of the proper use and possible adverse effects of oral minoxidil.  All of the patient's questions and concerns were addressed.
Arava Counseling:  Patient counseled regarding adverse effects of Arava including but not limited to nausea, vomiting, abnormalities in liver function tests. Patients may develop mouth sores, rash, diarrhea, and abnormalities in blood counts. The patient understands that monitoring is required including LFTs and blood counts.  There is a rare possibility of scarring of the liver and lung problems that can occur when taking methotrexate. Persistent nausea, loss of appetite, pale stools, dark urine, cough, and shortness of breath should be reported immediately. Patient advised to discontinue Arava treatment and consult with a physician prior to attempting conception. The patient will have to undergo a treatment to eliminate Arava from the body prior to conception.
Cibinqo Pregnancy And Lactation Text: It is unknown if this medication will adversely affect pregnancy or breast feeding.  You should not take this medication if you are currently pregnant or planning a pregnancy or while breastfeeding.
Klisyri Pregnancy And Lactation Text: It is unknown if this medication can harm a developing fetus or if it is excreted in breast milk.
Valtrex Pregnancy And Lactation Text: this medication is Pregnancy Category B and is considered safe during pregnancy. This medication is not directly found in breast milk but it's metabolite acyclovir is present.
Methotrexate Pregnancy And Lactation Text: This medication is Pregnancy Category X and is known to cause fetal harm. This medication is excreted in breast milk.
Hydroxychloroquine Pregnancy And Lactation Text: This medication has been shown to cause fetal harm but it isn't assigned a Pregnancy Risk Category. There are small amounts excreted in breast milk.
High Dose Vitamin A Counseling: Side effects reviewed, pt to contact office should one occur.
Azathioprine Counseling:  I discussed with the patient the risks of azathioprine including but not limited to myelosuppression, immunosuppression, hepatotoxicity, lymphoma, and infections.  The patient understands that monitoring is required including baseline LFTs, Creatinine, possible TPMP genotyping and weekly CBCs for the first month and then every 2 weeks thereafter.  The patient verbalized understanding of the proper use and possible adverse effects of azathioprine.  All of the patient's questions and concerns were addressed.
Cephalexin Counseling: I counseled the patient regarding use of cephalexin as an antibiotic for prophylactic and/or therapeutic purposes. Cephalexin (commonly prescribed under brand name Keflex) is a cephalosporin antibiotic which is active against numerous classes of bacteria, including most skin bacteria. Side effects may include nausea, diarrhea, gastrointestinal upset, rash, hives, yeast infections, and in rare cases, hepatitis, kidney disease, seizures, fever, confusion, neurologic symptoms, and others. Patients with severe allergies to penicillin medications are cautioned that there is about a 10% incidence of cross-reactivity with cephalosporins. When possible, patients with penicillin allergies should use alternatives to cephalosporins for antibiotic therapy.
Erivedge Counseling- I discussed with the patient the risks of Erivedge including but not limited to nausea, vomiting, diarrhea, constipation, weight loss, changes in the sense of taste, decreased appetite, muscle spasms, and hair loss.  The patient verbalized understanding of the proper use and possible adverse effects of Erivedge.  All of the patient's questions and concerns were addressed.
Rituxan Counseling:  I discussed with the patient the risks of Rituxan infusions. Side effects can include infusion reactions, severe drug rashes including mucocutaneous reactions, reactivation of latent hepatitis and other infections and rarely progressive multifocal leukoencephalopathy.  All of the patient's questions and concerns were addressed.
Spironolactone Counseling: Patient advised regarding risks of diarrhea, abdominal pain, hyperkalemia, birth defects (for female patients), liver toxicity and renal toxicity. The patient may need blood work to monitor liver and kidney function and potassium levels while on therapy. The patient verbalized understanding of the proper use and possible adverse effects of spironolactone.  All of the patient's questions and concerns were addressed.
Carac Counseling:  I discussed with the patient the risks of Carac including but not limited to erythema, scaling, itching, weeping, crusting, and pain.
Protopic Counseling: Patient may experience a mild burning sensation during topical application. Protopic is not approved in children less than 2 years of age. There have been case reports of hematologic and skin malignancies in patients using topical calcineurin inhibitors although causality is questionable.
Metronidazole Pregnancy And Lactation Text: This medication is Pregnancy Category B and considered safe during pregnancy.  It is also excreted in breast milk.
Ketoconazole Pregnancy And Lactation Text: This medication is Pregnancy Category C and it isn't know if it is safe during pregnancy. It is also excreted in breast milk and breast feeding isn't recommended.
Winlevi Pregnancy And Lactation Text: This medication is considered safe during pregnancy and breastfeeding.
Drysol Pregnancy And Lactation Text: This medication is considered safe during pregnancy and breast feeding.
Albendazole Counseling:  I discussed with the patient the risks of albendazole including but not limited to cytopenia, kidney damage, nausea/vomiting and severe allergy.  The patient understands that this medication is being used in an off-label manner.
Tetracycline Counseling: Patient counseled regarding possible photosensitivity and increased risk for sunburn.  Patient instructed to avoid sunlight, if possible.  When exposed to sunlight, patients should wear protective clothing, sunglasses, and sunscreen.  The patient was instructed to call the office immediately if the following severe adverse effects occur:  hearing changes, easy bruising/bleeding, severe headache, or vision changes.  The patient verbalized understanding of the proper use and possible adverse effects of tetracycline.  All of the patient's questions and concerns were addressed. Patient understands to avoid pregnancy while on therapy due to potential birth defects.
Oral Minoxidil Pregnancy And Lactation Text: This medication should only be used when clearly needed if you are pregnant, attempting to become pregnant or breast feeding.
Cimzia Counseling:  I discussed with the patient the risks of Cimzia including but not limited to immunosuppression, allergic reactions and infections.  The patient understands that monitoring is required including a PPD at baseline and must alert us or the primary physician if symptoms of infection or other concerning signs are noted.
Prednisone Counseling:  I discussed with the patient the risks of prolonged use of prednisone including but not limited to weight gain, insomnia, osteoporosis, mood changes, diabetes, susceptibility to infection, glaucoma and high blood pressure.  In cases where prednisone use is prolonged, patients should be monitored with blood pressure checks, serum glucose levels and an eye exam.  Additionally, the patient may need to be placed on GI prophylaxis, PCP prophylaxis, and calcium and vitamin D supplementation and/or a bisphosphonate.  The patient verbalized understanding of the proper use and the possible adverse effects of prednisone.  All of the patient's questions and concerns were addressed.
Klisyri Counseling:  I discussed with the patient the risks of Klisyri including but not limited to erythema, scaling, itching, weeping, crusting, and pain.
Libtayo Counseling- I discussed with the patient the risks of Libtayo including but not limited to nausea, vomiting, diarrhea, and bone or muscle pain.  The patient verbalized understanding of the proper use and possible adverse effects of Libtayo.  All of the patient's questions and concerns were addressed.
Cimetidine Counseling:  I discussed with the patient the risks of Cimetidine including but not limited to gynecomastia, headache, diarrhea, nausea, drowsiness, arrhythmias, pancreatitis, skin rashes, psychosis, bone marrow suppression and kidney toxicity.
Topical Retinoid counseling:  Patient advised to apply a pea-sized amount only at bedtime and wait 30 minutes after washing their face before applying.  If too drying, patient may add a non-comedogenic moisturizer. The patient verbalized understanding of the proper use and possible adverse effects of retinoids.  All of the patient's questions and concerns were addressed.
Isotretinoin Pregnancy And Lactation Text: This medication is Pregnancy Category X and is considered extremely dangerous during pregnancy. It is unknown if it is excreted in breast milk.
Taltz Counseling: I discussed with the patient the risks of ixekizumab including but not limited to immunosuppression, serious infections, worsening of inflammatory bowel disease and drug reactions.  The patient understands that monitoring is required including a PPD at baseline and must alert us or the primary physician if symptoms of infection or other concerning signs are noted.
Azathioprine Pregnancy And Lactation Text: This medication is Pregnancy Category D and isn't considered safe during pregnancy. It is unknown if this medication is excreted in breast milk.
Bactrim Pregnancy And Lactation Text: This medication is Pregnancy Category D and is known to cause fetal risk.  It is also excreted in breast milk.
Rituxan Pregnancy And Lactation Text: This medication is Pregnancy Category C and it isn't know if it is safe during pregnancy. It is unknown if this medication is excreted in breast milk but similar antibodies are known to be excreted.
Benzoyl Peroxide Pregnancy And Lactation Text: This medication is Pregnancy Category C. It is unknown if benzoyl peroxide is excreted in breast milk.
Metronidazole Counseling:  I discussed with the patient the risks of metronidazole including but not limited to seizures, nausea/vomiting, a metallic taste in the mouth, nausea/vomiting and severe allergy.
Spironolactone Pregnancy And Lactation Text: This medication can cause feminization of the male fetus and should be avoided during pregnancy. The active metabolite is also found in breast milk.
Ketoconazole Counseling:   Patient counseled regarding improving absorption with orange juice.  Adverse effects include but are not limited to breast enlargement, headache, diarrhea, nausea, upset stomach, liver function test abnormalities, taste disturbance, and stomach pain.  There is a rare possibility of liver failure that can occur when taking ketoconazole. The patient understands that monitoring of LFTs may be required, especially at baseline. The patient verbalized understanding of the proper use and possible adverse effects of ketoconazole.  All of the patient's questions and concerns were addressed.
Zyclara Counseling:  I discussed with the patient the risks of imiquimod including but not limited to erythema, scaling, itching, weeping, crusting, and pain.  Patient understands that the inflammatory response to imiquimod is variable from person to person and was educated regarded proper titration schedule.  If flu-like symptoms develop, patient knows to discontinue the medication and contact us.
Clofazimine Counseling:  I discussed with the patient the risks of clofazimine including but not limited to skin and eye pigmentation, liver damage, nausea/vomiting, gastrointestinal bleeding and allergy.
Ilumya Counseling: I discussed with the patient the risks of tildrakizumab including but not limited to immunosuppression, malignancy, posterior leukoencephalopathy syndrome, and serious infections.  The patient understands that monitoring is required including a PPD at baseline and must alert us or the primary physician if symptoms of infection or other concerning signs are noted.
Drysol Counseling:  I discussed with the patient the risks of drysol/aluminum chloride including but not limited to skin rash, itching, irritation, burning.
Otezla Counseling: The side effects of Otezla were discussed with the patient, including but not limited to worsening or new depression, weight loss, diarrhea, nausea, upper respiratory tract infection, and headache. Patient instructed to call the office should any adverse effect occur.  The patient verbalized understanding of the proper use and possible adverse effects of Otezla.  All the patient's questions and concerns were addressed.
Cimzia Pregnancy And Lactation Text: This medication crosses the placenta but can be considered safe in certain situations. Cimzia may be excreted in breast milk.
Libtayo Pregnancy And Lactation Text: This medication is contraindicated in pregnancy and when breast feeding.
Solaraze Pregnancy And Lactation Text: This medication is Pregnancy Category B and is considered safe. There is some data to suggest avoiding during the third trimester. It is unknown if this medication is excreted in breast milk.
Bactrim Counseling:  I discussed with the patient the risks of sulfa antibiotics including but not limited to GI upset, allergic reaction, drug rash, diarrhea, dizziness, photosensitivity, and yeast infections.  Rarely, more serious reactions can occur including but not limited to aplastic anemia, agranulocytosis, methemoglobinemia, blood dyscrasias, liver or kidney failure, lung infiltrates or desquamative/blistering drug rashes.
Isotretinoin Counseling: Patient should get monthly blood tests, not donate blood, not drive at night if vision affected, not share medication, and not undergo elective surgery for 6 months after tx completed. Side effects reviewed, pt to contact office should one occur.
Cellcept Counseling:  I discussed with the patient the risks of mycophenolate mofetil including but not limited to infection/immunosuppression, GI upset, hypokalemia, hypercholesterolemia, bone marrow suppression, lymphoproliferative disorders, malignancy, GI ulceration/bleed/perforation, colitis, interstitial lung disease, kidney failure, progressive multifocal leukoencephalopathy, and birth defects.  The patient understands that monitoring is required including a baseline creatinine and regular CBC testing. In addition, patient must alert us immediately if symptoms of infection or other concerning signs are noted.
Siliq Counseling:  I discussed with the patient the risks of Siliq including but not limited to new or worsening depression, suicidal thoughts and behavior, immunosuppression, malignancy, posterior leukoencephalopathy syndrome, and serious infections.  The patient understands that monitoring is required including a PPD at baseline and must alert us or the primary physician if symptoms of infection or other concerning signs are noted. There is also a special program designed to monitor depression which is required with Siliq.
Erythromycin Pregnancy And Lactation Text: This medication is Pregnancy Category B and is considered safe during pregnancy. It is also excreted in breast milk.
Picato Counseling:  I discussed with the patient the risks of Picato including but not limited to erythema, scaling, itching, weeping, crusting, and pain.
Finasteride Male Counseling: Finasteride Counseling:  I discussed with the patient the risks of use of finasteride including but not limited to decreased libido, decreased ejaculate volume, gynecomastia, and depression. Women should not handle medication.  All of the patient's questions and concerns were addressed.
SSKI Counseling:  I discussed with the patient the risks of SSKI including but not limited to thyroid abnormalities, metallic taste, GI upset, fever, headache, acne, arthralgias, paraesthesias, lymphadenopathy, easy bleeding, arrhythmias, and allergic reaction.
Benzoyl Peroxide Counseling: Patient counseled that medicine may cause skin irritation and bleach clothing.  In the event of skin irritation, the patient was advised to reduce the amount of the drug applied or use it less frequently.   The patient verbalized understanding of the proper use and possible adverse effects of benzoyl peroxide.  All of the patient's questions and concerns were addressed.
Ivermectin Counseling:  Patient instructed to take medication on an empty stomach with a full glass of water.  Patient informed of potential adverse effects including but not limited to nausea, diarrhea, dizziness, itching, and swelling of the extremities or lymph nodes.  The patient verbalized understanding of the proper use and possible adverse effects of ivermectin.  All of the patient's questions and concerns were addressed.
Otezla Pregnancy And Lactation Text: This medication is Pregnancy Category C and it isn't known if it is safe during pregnancy. It is unknown if it is excreted in breast milk.
Sarecycline Counseling: Patient advised regarding possible photosensitivity and discoloration of the teeth, skin, lips, tongue and gums.  Patient instructed to avoid sunlight, if possible.  When exposed to sunlight, patients should wear protective clothing, sunglasses, and sunscreen.  The patient was instructed to call the office immediately if the following severe adverse effects occur:  hearing changes, easy bruising/bleeding, severe headache, or vision changes.  The patient verbalized understanding of the proper use and possible adverse effects of sarecycline.  All of the patient's questions and concerns were addressed.
Cosentyx Counseling:  I discussed with the patient the risks of Cosentyx including but not limited to worsening of Crohn's disease, immunosuppression, allergic reactions and infections.  The patient understands that monitoring is required including a PPD at baseline and must alert us or the primary physician if symptoms of infection or other concerning signs are noted.
Imiquimod Counseling:  I discussed with the patient the risks of imiquimod including but not limited to erythema, scaling, itching, weeping, crusting, and pain.  Patient understands that the inflammatory response to imiquimod is variable from person to person and was educated regarded proper titration schedule.  If flu-like symptoms develop, patient knows to discontinue the medication and contact us.
Niacinamide Counseling: I recommended taking niacin or niacinamide, also know as vitamin B3, twice daily. Recent evidence suggests that taking vitamin B3 (500 mg twice daily) can reduce the risk of actinic keratoses and non-melanoma skin cancers. Side effects of vitamin B3 include flushing and headache.
Doxepin Counseling:  Patient advised that the medication is sedating and not to drive a car after taking this medication. Patient informed of potential adverse effects including but not limited to dry mouth, urinary retention, and blurry vision.  The patient verbalized understanding of the proper use and possible adverse effects of doxepin.  All of the patient's questions and concerns were addressed.
Tremfya Counseling: I discussed with the patient the risks of guselkumab including but not limited to immunosuppression, serious infections, and drug reactions.  The patient understands that monitoring is required including a PPD at baseline and must alert us or the primary physician if symptoms of infection or other concerning signs are noted.
Azithromycin Pregnancy And Lactation Text: This medication is considered safe during pregnancy and is also secreted in breast milk.
Solaraze Counseling:  I discussed with the patient the risks of Solaraze including but not limited to erythema, scaling, itching, weeping, crusting, and pain.
Sski Pregnancy And Lactation Text: This medication is Pregnancy Category D and isn't considered safe during pregnancy. It is excreted in breast milk.
Opzelura Pregnancy And Lactation Text: There is insufficient data to evaluate drug-associated risk for major birth defects, miscarriage, or other adverse maternal or fetal outcomes.  There is a pregnancy registry that monitors pregnancy outcomes in pregnant persons exposed to the medication during pregnancy.  It is unknown if this medication is excreted in breast milk.  Do not breastfeed during treatment and for about 4 weeks after the last dose.
Acitretin Counseling:  I discussed with the patient the risks of acitretin including but not limited to hair loss, dry lips/skin/eyes, liver damage, hyperlipidemia, depression/suicidal ideation, photosensitivity.  Serious rare side effects can include but are not limited to pancreatitis, pseudotumor cerebri, bony changes, clot formation/stroke/heart attack.  Patient understands that alcohol is contraindicated since it can result in liver toxicity and significantly prolong the elimination of the drug by many years.
Finasteride Pregnancy And Lactation Text: This medication is absolutely contraindicated during pregnancy. It is unknown if it is excreted in breast milk.
Erythromycin Counseling:  I discussed with the patient the risks of erythromycin including but not limited to GI upset, allergic reaction, drug rash, diarrhea, increase in liver enzymes, and yeast infections.
Colchicine Counseling:  Patient counseled regarding adverse effects including but not limited to stomach upset (nausea, vomiting, stomach pain, or diarrhea).  Patient instructed to limit alcohol consumption while taking this medication.  Colchicine may reduce blood counts especially with prolonged use.  The patient understands that monitoring of kidney function and blood counts may be required, especially at baseline. The patient verbalized understanding of the proper use and possible adverse effects of colchicine.  All of the patient's questions and concerns were addressed.
Infliximab Counseling:  I discussed with the patient the risks of infliximab including but not limited to myelosuppression, immunosuppression, autoimmune hepatitis, demyelinating diseases, lymphoma, and serious infections.  The patient understands that monitoring is required including a PPD at baseline and must alert us or the primary physician if symptoms of infection or other concerning signs are noted.

## 2022-08-03 NOTE — PROCEDURE: MIPS QUALITY
Quality 111:Pneumonia Vaccination Status For Older Adults: Pneumococcal vaccine administered on or after patient’s 60th birthday and before the end of the measurement period
Quality 226: Preventive Care And Screening: Tobacco Use: Screening And Cessation Intervention: Patient screened for tobacco use and is an ex/non-smoker
Quality 130: Documentation Of Current Medications In The Medical Record: Current Medications Documented
Detail Level: Detailed
Quality 431: Preventive Care And Screening: Unhealthy Alcohol Use - Screening: Patient identified as an unhealthy alcohol user when screened for unhealthy alcohol use using a systematic screening method and received brief counseling

## 2022-08-03 NOTE — PROCEDURE: DIAGNOSIS COMMENT
Render Risk Assessment In Note?: no
Comment: Benign appearing lesion. Appears unchanged. Reassuring signs on dermoscopy. Pt states she has had lesion for decades and appears relatively unchanged.
Detail Level: Simple
Comment: Pt counseled in moisturizer regularly, especially after washing hands. Advised to wear gloves when using dish soap. Pt counseled in wearing gloves to sleep with moisturizer. Pt used to have triancinalome rx from dermatology, states she did not like how worked.\\nPt counseled in using flucocinonide ointment twice a day for fourteen days and tacrolimus ointment twice a day for minor flares / more minor flares.

## 2022-11-16 ENCOUNTER — TELEPHONE (OUTPATIENT)
Dept: MEDICAL GROUP | Facility: MEDICAL CENTER | Age: 67
End: 2022-11-16
Payer: COMMERCIAL

## 2022-11-17 NOTE — TELEPHONE ENCOUNTER
Please notify the patient that her mammogram done at [facility] is negative, she can repeat her mammogram in 1 year.

## 2022-11-17 NOTE — TELEPHONE ENCOUNTER
Phone Number Called: [phone] (home)     Call outcome: Spoke to patient regarding message below.    Message: Patient informed of mammogram results.

## 2022-12-20 ENCOUNTER — TELEPHONE (OUTPATIENT)
Dept: MEDICAL GROUP | Facility: MEDICAL CENTER | Age: 67
End: 2022-12-20

## 2022-12-20 ENCOUNTER — OFFICE VISIT (OUTPATIENT)
Dept: MEDICAL GROUP | Facility: MEDICAL CENTER | Age: 67
End: 2022-12-20
Payer: COMMERCIAL

## 2022-12-20 VITALS
OXYGEN SATURATION: 94 % | BODY MASS INDEX: 26.29 KG/M2 | DIASTOLIC BLOOD PRESSURE: 68 MMHG | HEIGHT: 64 IN | SYSTOLIC BLOOD PRESSURE: 108 MMHG | TEMPERATURE: 97.4 F | HEART RATE: 83 BPM | WEIGHT: 154 LBS

## 2022-12-20 DIAGNOSIS — Z00.00 PREVENTATIVE HEALTH CARE: Chronic | ICD-10-CM

## 2022-12-20 DIAGNOSIS — R94.5 ABNORMAL LIVER FUNCTION: ICD-10-CM

## 2022-12-20 DIAGNOSIS — E78.5 DYSLIPIDEMIA: Chronic | ICD-10-CM

## 2022-12-20 PROCEDURE — 99397 PER PM REEVAL EST PAT 65+ YR: CPT | Performed by: INTERNAL MEDICINE

## 2022-12-20 RX ORDER — TACROLIMUS 1 MG/G
OINTMENT TOPICAL
COMMUNITY
Start: 2022-08-03

## 2022-12-20 RX ORDER — FLUOCINONIDE 0.5 MG/G
OINTMENT TOPICAL
COMMUNITY
Start: 2022-08-03

## 2022-12-20 SDOH — ECONOMIC STABILITY: TRANSPORTATION INSECURITY
IN THE PAST 12 MONTHS, HAS THE LACK OF TRANSPORTATION KEPT YOU FROM MEDICAL APPOINTMENTS OR FROM GETTING MEDICATIONS?: NO

## 2022-12-20 SDOH — ECONOMIC STABILITY: INCOME INSECURITY: IN THE LAST 12 MONTHS, WAS THERE A TIME WHEN YOU WERE NOT ABLE TO PAY THE MORTGAGE OR RENT ON TIME?: NO

## 2022-12-20 SDOH — ECONOMIC STABILITY: HOUSING INSECURITY
IN THE LAST 12 MONTHS, WAS THERE A TIME WHEN YOU DID NOT HAVE A STEADY PLACE TO SLEEP OR SLEPT IN A SHELTER (INCLUDING NOW)?: NO

## 2022-12-20 SDOH — HEALTH STABILITY: PHYSICAL HEALTH: ON AVERAGE, HOW MANY MINUTES DO YOU ENGAGE IN EXERCISE AT THIS LEVEL?: 20 MIN

## 2022-12-20 SDOH — HEALTH STABILITY: PHYSICAL HEALTH: ON AVERAGE, HOW MANY DAYS PER WEEK DO YOU ENGAGE IN MODERATE TO STRENUOUS EXERCISE (LIKE A BRISK WALK)?: 4 DAYS

## 2022-12-20 SDOH — ECONOMIC STABILITY: FOOD INSECURITY: WITHIN THE PAST 12 MONTHS, THE FOOD YOU BOUGHT JUST DIDN'T LAST AND YOU DIDN'T HAVE MONEY TO GET MORE.: NEVER TRUE

## 2022-12-20 SDOH — ECONOMIC STABILITY: TRANSPORTATION INSECURITY
IN THE PAST 12 MONTHS, HAS LACK OF RELIABLE TRANSPORTATION KEPT YOU FROM MEDICAL APPOINTMENTS, MEETINGS, WORK OR FROM GETTING THINGS NEEDED FOR DAILY LIVING?: NO

## 2022-12-20 SDOH — ECONOMIC STABILITY: FOOD INSECURITY: WITHIN THE PAST 12 MONTHS, YOU WORRIED THAT YOUR FOOD WOULD RUN OUT BEFORE YOU GOT MONEY TO BUY MORE.: NEVER TRUE

## 2022-12-20 SDOH — ECONOMIC STABILITY: TRANSPORTATION INSECURITY
IN THE PAST 12 MONTHS, HAS LACK OF TRANSPORTATION KEPT YOU FROM MEETINGS, WORK, OR FROM GETTING THINGS NEEDED FOR DAILY LIVING?: NO

## 2022-12-20 SDOH — ECONOMIC STABILITY: HOUSING INSECURITY: IN THE LAST 12 MONTHS, HOW MANY PLACES HAVE YOU LIVED?: 1

## 2022-12-20 SDOH — HEALTH STABILITY: MENTAL HEALTH
STRESS IS WHEN SOMEONE FEELS TENSE, NERVOUS, ANXIOUS, OR CAN'T SLEEP AT NIGHT BECAUSE THEIR MIND IS TROUBLED. HOW STRESSED ARE YOU?: PATIENT DECLINED

## 2022-12-20 SDOH — ECONOMIC STABILITY: INCOME INSECURITY: HOW HARD IS IT FOR YOU TO PAY FOR THE VERY BASICS LIKE FOOD, HOUSING, MEDICAL CARE, AND HEATING?: NOT HARD AT ALL

## 2022-12-20 ASSESSMENT — LIFESTYLE VARIABLES
HOW MANY STANDARD DRINKS CONTAINING ALCOHOL DO YOU HAVE ON A TYPICAL DAY: 1 OR 2
HOW OFTEN DO YOU HAVE A DRINK CONTAINING ALCOHOL: PATIENT DECLINED
HOW OFTEN DO YOU HAVE SIX OR MORE DRINKS ON ONE OCCASION: NEVER
AUDIT-C TOTAL SCORE: -1
SKIP TO QUESTIONS 9-10: 0

## 2022-12-20 ASSESSMENT — SOCIAL DETERMINANTS OF HEALTH (SDOH)
HOW OFTEN DO YOU ATTEND CHURCH OR RELIGIOUS SERVICES?: MORE THAN 4 TIMES PER YEAR
HOW OFTEN DO YOU GET TOGETHER WITH FRIENDS OR RELATIVES?: TWICE A WEEK
HOW OFTEN DO YOU HAVE A DRINK CONTAINING ALCOHOL: PATIENT DECLINED
IN A TYPICAL WEEK, HOW MANY TIMES DO YOU TALK ON THE PHONE WITH FAMILY, FRIENDS, OR NEIGHBORS?: MORE THAN THREE TIMES A WEEK
HOW MANY DRINKS CONTAINING ALCOHOL DO YOU HAVE ON A TYPICAL DAY WHEN YOU ARE DRINKING: 1 OR 2
DO YOU BELONG TO ANY CLUBS OR ORGANIZATIONS SUCH AS CHURCH GROUPS UNIONS, FRATERNAL OR ATHLETIC GROUPS, OR SCHOOL GROUPS?: YES
DO YOU BELONG TO ANY CLUBS OR ORGANIZATIONS SUCH AS CHURCH GROUPS UNIONS, FRATERNAL OR ATHLETIC GROUPS, OR SCHOOL GROUPS?: YES
HOW OFTEN DO YOU GET TOGETHER WITH FRIENDS OR RELATIVES?: TWICE A WEEK
HOW OFTEN DO YOU ATTENT MEETINGS OF THE CLUB OR ORGANIZATION YOU BELONG TO?: MORE THAN 4 TIMES PER YEAR
IN A TYPICAL WEEK, HOW MANY TIMES DO YOU TALK ON THE PHONE WITH FAMILY, FRIENDS, OR NEIGHBORS?: MORE THAN THREE TIMES A WEEK
HOW OFTEN DO YOU ATTENT MEETINGS OF THE CLUB OR ORGANIZATION YOU BELONG TO?: MORE THAN 4 TIMES PER YEAR
HOW HARD IS IT FOR YOU TO PAY FOR THE VERY BASICS LIKE FOOD, HOUSING, MEDICAL CARE, AND HEATING?: NOT HARD AT ALL
WITHIN THE PAST 12 MONTHS, YOU WORRIED THAT YOUR FOOD WOULD RUN OUT BEFORE YOU GOT THE MONEY TO BUY MORE: NEVER TRUE
HOW OFTEN DO YOU HAVE SIX OR MORE DRINKS ON ONE OCCASION: NEVER
HOW OFTEN DO YOU ATTEND CHURCH OR RELIGIOUS SERVICES?: MORE THAN 4 TIMES PER YEAR

## 2022-12-20 ASSESSMENT — FIBROSIS 4 INDEX: FIB4 SCORE: 1.15

## 2022-12-20 NOTE — LETTER
[employer]  Feliciano Olson M.D.  [address]  [place] [address]  Fax: [phone]   Authorization for Release/Disclosure of   Protected Health Information   Name: NICOLE HAJI : 1955 SSN: [id number]   Address: [address] [place] [address]  [place] [address] Phone:    [phone] (home)    I authorize the entity listed below to release/disclose the PHI below to:   [facility]/Feliciano Olson M.D. and Feliciano Olson M.D.   Provider or Entity Name:                                                        [facility])   Address   City, State, [facility]   Phone:      Fax:                  [phone]   Reason for request: continuity of care   Information to be released: OLD RECORDS   [  ] LAST COLONOSCOPY,  including any PATH REPORT and follow-up  [  ] LAST FIT/COLOGUARD RESULT [  ] LAST DEXA  [  ] LAST MAMMOGRAM  [  ] LAST PAP  [  ] LAST LABS [  ] RETINA EXAM REPORT  [  ] IMMUNIZATION RECORDS  [ x ] Release all info      [  ] Check here and initial the line next to each item to release ALL health information INCLUDING  _____ Care and treatment for drug and / or alcohol abuse  _____ HIV testing, infection status, or AIDS  _____ Genetic Testing    DATES OF SERVICE OR TIME PERIOD TO BE DISCLOSED: _____________  I understand and acknowledge that:  * This Authorization may be revoked at any time by you in writing, except if your health information has already been used or disclosed.  * Your health information that will be used or disclosed as a result of you signing this authorization could be re-disclosed by the recipient. If this occurs, your re-disclosed health information may no longer be protected by State or Federal laws.  * You may refuse to sign this Authorization. Your refusal will not affect your ability to obtain treatment.  * This Authorization becomes effective upon signing and will  on (date) __________.      If no date is indicated, this Authorization will  one (1)  year from the signature date.    Name: Adrienne Morales    Signature:                             Continuity of Care   Date:     12/23/2022       PLEASE FAX REQUESTED RECORDS BACK TO: [phone]

## 2022-12-20 NOTE — PROGRESS NOTES
Subjective     Adrienne Morales is a 67 y.o. female who presents annual           HPI      Annual exam   Sees dermatology   Sees    Se[facility]ology  [facility]  Eye exam annually   Teeth cleaning every six months  She has had a difficult year, her  has had a heart attack, her mother-in-law has passed away, she did get to travel to [place] to see her son and granddaughter  SH , tries to keep active and walks, no falls or balance disorder   Medications, allergies, medical history, surgical history, social history, family history  reviewed and updated      Current Outpatient Medications   Medication Sig Dispense Refill    fluocinonide (LIDEX) 0.05 % Ointment       tacrolimus (PROTOPIC) 0.1 % Ointment       sumatriptan (IMITREX) 100 MG tablet TAKE 1 TABLET BY MOUTH ONCE AS NEEDED FOR  MIGRAINE  FOR UP TO 1 DOSE 10 Tablet 3    Cholecalciferol (VITAMIN D) 2000 UNIT Tab Take  by mouth every day.      Naproxen Sodium (ALEVE PO) Take  by mouth.       No current facility-administered medications for this visit.                  thyroid nodule  2/12/22 ultrasound soft tissue neck at [facility] left upper pole nodule 0.8 x 0.7 x 0.6 cm TIRADS 5 suspicious   4/26/22  endocrine note labs ordered, repeat ultrasound thyroid in 6 months to monitor size and progression  9/27/22 tsh 2.2,free t4 1.23,free t3 3.1,TPO 11 per  endocrine  10/7/22  endocrine note repeat thyroid ultrasound one year     Sleep disordered breathing  12/12/19 OPO through preferred see if perhaps she has sleep apnea contributing to migraine headaches  1/6/20 OPO through preferred time less than 88% 23 minutes, low saturation 76%, basal saturation 92%     Preventative health  12/13/13 colon per [facility] diverticulosis repeat 10 years  9/12/14 tdap  12/31/19 hep c ab negative  1/15/20 cologuard negative  12/14/20 declines pneumovax, prevnar, influenza, shingrix vaccines  1/5/22 covid  booster  1/5/22 dexa at [facility] LS-0.4,hip-1.8   9/27/22 vit d 44 per  endocrine  11/15/22 mammogram at [facility] negative     Osteopenia  12/10/12 dexa LS -0.9, hip -1.2 per gyn  9/16/14 vit d 28 start 2000 units  10/6/15 dexa LS 0.0,hip -1.8 ;FRAX 9% major,1.0% hip  10/14/15 vit d 32   7/22/16 start 2000 units   10/19/16 vit d 35 on 2000 units done at [facility]  10/20/17 vit d 37 done at [facility]  11/10/17 dexa LS-0.5,hip-2.0;FRAX 10% major,1.5% hip done at [facility]  11/27/18 vit d 42  12/31/19 dexa at [facility] LS-0.5,hip-1.9 done at [facility],FRAX 10.8% major,2.5% hip  1/11/20 vit d 43  1/6/21 vit d 44  1/5/22 dexa at [facility] LS-0.4,hip-1.8  1/13/22 vit d 44  9/27/22 vit d 44 per  endocrine    Insomnia uses Ambien for travel     Impaired glucose metabolism  12/31/19 A1c 5.8%  1/6/21 A1c 5.6%  1/13/22 A1c 5.7%     History of perforated tympanic membrane     History migraine tried Inderal previously but caused low blood pressure  Tried inderal in her 20s, but caused low bp  10/24/17 more migraine recently and declines medication   12/11/18 uses imitrex as needed  12/14/20 on imitrex as needed     History endometrial cancer  3/10 JACKLYN BSO for stage I endometrial cancer   2013 gyn exam  sees twice per year  9/14 sees  gyn  3/7/17 pap per gyn   3/18 saw  gyn     history covid  1/17/22 patient MyChart message positive home test, will order PCR  1/18/22 covid positive rapid test     Family history cardiovascular disease  Father CABG and sister heart disease  11/20/13 echo normal LV 60%  12/10/13 CT calcium score 0     Dyslipidemia  11/20/13 echo normal LV 60%  11/14/13 chol 191,trig 113,hdl 61,ldl 107  12/10/13 CT calcium score 0  9/16/14 chol 206,trig 91,hdl 74,ldl 114  10/14/15 chol 215,trig 83,hdl 81,ldl 117  10/19/16 chol 189,trig 127,hdl 61,ldl 103 [facility]  10/24/17 chol 205,trig 83,hdl 66,ldl 121  11/27/18 chol 204,trig 121,hdl 62,ldl 121  12/31/19 chol 204,trig 130,hdl 65,ldl 113  1/6/21 chol  206,trig 154,hdl 61,ldl 118; 10 year risk 5.75%  1/13/22 chol 213,trig 158,hdl 57,ldl 128; 10 year risk 4.5%     abn liver enzymes  9/15/14 AST 22,ALT 35,alk phos 118,bili 0.3  11/27/18 AST 42,ALT 50,alk phos 121,bili 0.3  12/31/19 AST 56,ALT 30,alk phos 122,bili 0.4  1/8/20 ultrasound abdomen done at [facility], diffusely echogenic liver likely representing hepatic steatosis, no focal mass, common bile duct within normal limits 0.3 cm, pancreas unremarkable, gallbladder and spleen unremarkable  1/11/20 alk phos 137, acute hepatitis panel negative, iron 108,%sat 30, ferritin 75, SPEP negative, AMA negative, smooth muscle antibody negative  5/22/20 AST 35,ALT 40,GGT 69, alkaline phosphatase 115, liver fraction 58% (18-85%), bone fractions 42% (14-68%), bilirubin 0.2  1/6/21 alk phos 142,AST 39,ALT 73,bili 0.3  4/26/21 AST 66, ALT 89, GGT 95, fibrosis score 0.23 (stage F0-F1), steatosis score 0.65 (S2 moderate steatosis), BANKS 0.25 (N0)  1/13/22 alk phos 133(),bili 0.3,AST 24,ALT 40,GGT 70       Patient Active Problem List   Diagnosis    Preventative health care    Insomnia    History of endometrial cancer    History of migraine    Osteopenia    History of perforated tympanic membrane    Family history of cardiovascular disease    Dyslipidemia    Abnormal liver function    Dermatochalasis    Impaired glucose metabolism    Sleep disorder breathing    History of shingles    History of COVID-19    Thyroid nodule                Patient Care Team:  Feliciano Olson M.D. as PCP - General (Internal Medicine)      ROS  Occasional left midfoot pain, no tenderness on exam, no edema, normal dorsalis pedis pulse left foot         Objective          Physical Exam  Vitals and nursing note reviewed.   Constitutional:       Appearance: Normal appearance.   HENT:      Head: Normocephalic and atraumatic.      Right Ear: External ear normal.      Left Ear: External ear normal.   Eyes:      Conjunctiva/sclera: Conjunctivae normal.    Cardiovascular:      Rate and Rhythm: Normal rate and regular rhythm.      Heart sounds: Normal heart sounds.   Pulmonary:      Effort: Pulmonary effort is normal.      Breath sounds: Normal breath sounds.   Abdominal:      General: There is no distension.   Skin:     General: Skin is warm.   Neurological:      General: No focal deficit present.      Mental Status: She is alert.   Psychiatric:         Mood and Affect: Mood normal.         Behavior: Behavior normal.         Dry skin on her back                      Assessment & Plan        Assessment  #! annual exam      #2 Thyroid nodule followed by endocrinology 2/12/22 ultrasound soft tissue neck at [facility] left upper pole nodule 0.8 x 0.7 x 0.6 cm TIRADS 5 suspicious most recent visit 10/7/22  endocrine note repeat thyroid ultrasound one year    #3 dyslipidemia 1/13/22 chol 213,trig 158,hdl 57,ldl 128; 10 year risk 4.5% diet controlled    #4 postmenopausal bone loss 1/5/22 dexa at [facility] LS-0.4,hip-1.8    #5 impaired glucose metabolism A1c 5.7% in January followed by endocrinology    #6 history of endometrial cancer status post JACKLYN/BSO 2010 followed by gynecology    #7 eczema followed by dermatology    #8 abn alk phos 1/13/22 alk phos 133 (),bili 0.3,AST 24,ALT 40,GGT 70 previous isoenzymes normal, previous ultrasound hepatic steatosis unremarkable gallbladder and pancreas    Plan   #1 health maintenance reviewed and updated    #2 obtain old records [facility] ultrasound thyroid     #3 skin cancer derm records     #4 old records [facility]ne[facility], she will find a new gynecologist    #5 old records endocrinology, follow-up with endocrinology    #6 obtain the results of labs done by endocrinology in October    #7 recommend she consider the Prevnar 20 vaccine, she declines for now    #8 she can have the shingles vaccine at the pharmacy    #9 recommend she consider the COVID booster, she can get that at the pharmacy    #10 start a regular  exercise program, continue work on a good nutrition program    #11 colonoscopy due next year    #12 we will notify her with lab results, follow-up 1 year

## 2022-12-20 NOTE — TELEPHONE ENCOUNTER
Need old records  (1) ultrasound thyroid [facility] at [facility]  [facility]  (3) [facility]  [facility]

## 2022-12-20 NOTE — LETTER
[employer]  Feliciano Olson M.D.  [address]  [place] [address]  Fax: [phone]   Authorization for Release/Disclosure of   Protected Health Information   Name: NICOLE HAJI : 1955 SSN: [id number]   Address: [address] [place] [address]  [place] [address] Phone:    [phone] (home)    I authorize the entity listed below to release/disclose the PHI below to:   [facility]/Feliciano Olson M.D. and Feliciano Olson M.D.   Provider or Entity Name:                                                      [facility]   Address   City, [facility]   Phone:      Fax:                 [phone]   Reason for request: continuity of care   Information to be released:  OLD RECORDS   [  ] LAST COLONOSCOPY,  including any PATH REPORT and follow-up  [  ] LAST FIT/COLOGUARD RESULT [  ] LAST DEXA  [  ] LAST MAMMOGRAM  [  ] LAST PAP  [  ] LAST LABS [  ] RETINA EXAM REPORT  [  ] IMMUNIZATION RECORDS  [ c ] Release all info      [  ] Check here and initial the line next to each item to release ALL health information INCLUDING  _____ Care and treatment for drug and / or alcohol abuse  _____ HIV testing, infection status, or AIDS  _____ Genetic Testing    DATES OF SERVICE OR TIME PERIOD TO BE DISCLOSED: _____________  I understand and acknowledge that:  * This Authorization may be revoked at any time by you in writing, except if your health information has already been used or disclosed.  * Your health information that will be used or disclosed as a result of you signing this authorization could be re-disclosed by the recipient. If this occurs, your re-disclosed health information may no longer be protected by State or Federal laws.  * You may refuse to sign this Authorization. Your refusal will not affect your ability to obtain treatment.  * This Authorization becomes effective upon signing and will  on (date) __________.      If no date is indicated, this Authorization will  one (1) year from the  signature date.    Name: Adrienne Morales    Signature:                         Continuity of Care   Date:     12/23/2022       PLEASE FAX REQUESTED RECORDS BACK TO: [phone]

## 2022-12-23 PROBLEM — L98.9 SKIN LESION: Status: ACTIVE | Noted: 2022-12-23

## 2023-01-11 ENCOUNTER — HOSPITAL ENCOUNTER (OUTPATIENT)
Dept: RADIOLOGY | Facility: MEDICAL CENTER | Age: 68
End: 2023-01-11
Payer: COMMERCIAL

## 2023-01-23 DIAGNOSIS — Z86.69 HISTORY OF MIGRAINE: ICD-10-CM

## 2023-01-23 RX ORDER — SUMATRIPTAN 100 MG/1
100 TABLET, FILM COATED ORAL
Qty: 10 TABLET | Refills: 6 | Status: SHIPPED | OUTPATIENT
Start: 2023-01-23

## 2023-03-08 ENCOUNTER — TELEPHONE (OUTPATIENT)
Dept: MEDICAL GROUP | Facility: MEDICAL CENTER | Age: 68
End: 2023-03-08
Payer: COMMERCIAL

## 2023-03-08 DIAGNOSIS — R74.8 ABNORMAL ALKALINE PHOSPHATASE TEST: ICD-10-CM

## 2023-03-08 DIAGNOSIS — E55.9 VITAMIN D DEFICIENCY: ICD-10-CM

## 2023-03-08 DIAGNOSIS — E04.1 THYROID NODULE: ICD-10-CM

## 2023-03-08 DIAGNOSIS — M85.80 OSTEOPENIA, UNSPECIFIED LOCATION: Chronic | ICD-10-CM

## 2023-03-08 DIAGNOSIS — R94.5 ABNORMAL LIVER FUNCTION: ICD-10-CM

## 2023-03-08 LAB
ALBUMIN SERPL-MCNC: 4.6 G/DL (ref 3.8–4.8)
ALBUMIN/GLOB SERPL: 1.9 {RATIO} (ref 1.2–2.2)
ALP SERPL-CCNC: 171 IU/L (ref 44–121)
ALT SERPL-CCNC: 62 IU/L (ref 0–32)
APPEARANCE UR: CLEAR
AST SERPL-CCNC: 35 IU/L (ref 0–40)
BACTERIA #/AREA URNS HPF: NORMAL /[HPF]
BASOPHILS # BLD AUTO: 0.1 X10E3/UL (ref 0–0.2)
BASOPHILS NFR BLD AUTO: 1 %
BILIRUB SERPL-MCNC: 0.4 MG/DL (ref 0–1.2)
BILIRUB UR QL STRIP: NEGATIVE
BUN SERPL-MCNC: 12 MG/DL (ref 8–27)
BUN/CREAT SERPL: 18 (ref 12–28)
CALCIUM SERPL-MCNC: 9.4 MG/DL (ref 8.7–10.3)
CASTS URNS MICRO: NORMAL
CASTS URNS QL MICRO: NORMAL /LPF
CHLORIDE SERPL-SCNC: 101 MMOL/L (ref 96–106)
CHOLEST SERPL-MCNC: 218 MG/DL (ref 100–199)
CO2 SERPL-SCNC: 28 MMOL/L (ref 20–29)
COLOR UR: YELLOW
CREAT SERPL-MCNC: 0.66 MG/DL (ref 0.57–1)
CRYSTALS URNS MICRO: NORMAL
EGFRCR SERPLBLD CKD-EPI 2021: 96 ML/MIN/1.73
EOSINOPHIL # BLD AUTO: 0.1 X10E3/UL (ref 0–0.4)
EOSINOPHIL NFR BLD AUTO: 2 %
EPI CELLS #/AREA URNS HPF: NORMAL /HPF (ref 0–10)
ERYTHROCYTE [DISTWIDTH] IN BLOOD BY AUTOMATED COUNT: 12.3 % (ref 11.7–15.4)
GLOBULIN SER CALC-MCNC: 2.4 G/DL (ref 1.5–4.5)
GLUCOSE SERPL-MCNC: 104 MG/DL (ref 70–99)
GLUCOSE UR QL STRIP: NEGATIVE
HBA1C MFR BLD: 5.9 % (ref 4.8–5.6)
HCT VFR BLD AUTO: 43.8 % (ref 34–46.6)
HDLC SERPL-MCNC: 67 MG/DL
HGB BLD-MCNC: 14.3 G/DL (ref 11.1–15.9)
HGB UR QL STRIP: NEGATIVE
IMM GRANULOCYTES # BLD AUTO: 0 X10E3/UL (ref 0–0.1)
IMM GRANULOCYTES NFR BLD AUTO: 0 %
IMMATURE CELLS  115398: NORMAL
KETONES UR QL STRIP: NEGATIVE
LABORATORY COMMENT REPORT: ABNORMAL
LDLC SERPL CALC-MCNC: 132 MG/DL (ref 0–99)
LEUKOCYTE ESTERASE UR QL STRIP: NEGATIVE
LYMPHOCYTES # BLD AUTO: 1.9 X10E3/UL (ref 0.7–3.1)
LYMPHOCYTES NFR BLD AUTO: 22 %
MCH RBC QN AUTO: 28.9 PG (ref 26.6–33)
MCHC RBC AUTO-ENTMCNC: 32.6 G/DL (ref 31.5–35.7)
MCV RBC AUTO: 89 FL (ref 79–97)
MICRO URNS: NORMAL
MICRO URNS: NORMAL
MONOCYTES # BLD AUTO: 0.7 X10E3/UL (ref 0.1–0.9)
MONOCYTES NFR BLD AUTO: 8 %
MORPHOLOGY BLD-IMP: NORMAL
MUCOUS THREADS URNS QL MICRO: NORMAL
NEUTROPHILS # BLD AUTO: 5.7 X10E3/UL (ref 1.4–7)
NEUTROPHILS NFR BLD AUTO: 67 %
NITRITE UR QL STRIP: NEGATIVE
NRBC BLD AUTO-RTO: NORMAL %
PH UR STRIP: 5.5 [PH] (ref 5–7.5)
PLATELET # BLD AUTO: 250 X10E3/UL (ref 150–450)
POTASSIUM SERPL-SCNC: 4.1 MMOL/L (ref 3.5–5.2)
PROT SERPL-MCNC: 7 G/DL (ref 6–8.5)
PROT UR QL STRIP: NEGATIVE
RBC # BLD AUTO: 4.95 X10E6/UL (ref 3.77–5.28)
RBC #/AREA URNS HPF: NORMAL /HPF (ref 0–2)
RENAL EPI CELLS #/AREA URNS HPF: NORMAL /[HPF]
SODIUM SERPL-SCNC: 140 MMOL/L (ref 134–144)
SP GR UR STRIP: 1.01 (ref 1–1.03)
T VAGINALIS URNS QL MICRO: NORMAL
TRIGL SERPL-MCNC: 110 MG/DL (ref 0–149)
UNIDENT CRYS URNS QL MICRO: NORMAL
URINALYSIS REFLEX  377202: NORMAL
URNS CMNT MICRO: NORMAL
UROBILINOGEN UR STRIP-MCNC: 0.2 MG/DL (ref 0.2–1)
VLDLC SERPL CALC-MCNC: 19 MG/DL (ref 5–40)
WBC # BLD AUTO: 8.5 X10E3/UL (ref 3.4–10.8)
WBC #/AREA URNS HPF: NORMAL /HPF (ref 0–5)
YEAST #/AREA URNS HPF: NORMAL /[HPF]

## 2023-03-08 NOTE — TELEPHONE ENCOUNTER
Notified with results, cholesterol stable, A1c stable, continue work on a good nutrition program limiting processed foods, sweets, carbohydrate portion serving sizes.  Abnormal alkaline phosphatase, previous work-up 2020 ultrasound showed hepatic steatosis, alkaline phosphatase enzymes and secondary work-up negative including fractionation.  Given the increase in alkaline phosphatase will repeat work-up again, lab orders printed and mailed to the patient, ultrasound needs to be done at [facility], order had written and faxed.

## 2023-03-08 NOTE — TELEPHONE ENCOUNTER
Notified with results, alkaline phosphatase elevated, previous work-up negative, will repeat ultrasound right upper quadrant at [facility] order faxed, repeat labs including alkaline phosphatase isoenzymes and secondary work-up

## 2023-05-21 ENCOUNTER — TELEPHONE (OUTPATIENT)
Dept: MEDICAL GROUP | Facility: MEDICAL CENTER | Age: 68
End: 2023-05-21
Payer: COMMERCIAL

## 2023-05-22 NOTE — TELEPHONE ENCOUNTER
Notified with ultrasound done at [facility], right upper quadrant ultrasound hepatic steatosis, no other significant findings.  No change from previous ultrasound, hepatic steatosis possibly contributing factor to liver enzyme elevation, she has repeat labs ordered she can get that done end of summer or early fall with her endocrinology labs.

## 2023-05-22 NOTE — TELEPHONE ENCOUNTER
Notified with ultrasound done at [facility], right upper quadrant ultrasound hepatic steatosis, no other significant findings.  No change from previous ultrasound, hepatic steatosis possibly contributing factor to liver enzyme elevation, she has repeat labs ordered she can get that done end of summer or early fall with her endocrinology labs.

## 2023-06-22 ENCOUNTER — GYNECOLOGY VISIT (OUTPATIENT)
Dept: OBGYN | Facility: CLINIC | Age: 68
End: 2023-06-22
Payer: COMMERCIAL

## 2023-06-22 VITALS — WEIGHT: 154.8 LBS | BODY MASS INDEX: 26.57 KG/M2 | DIASTOLIC BLOOD PRESSURE: 62 MMHG | SYSTOLIC BLOOD PRESSURE: 89 MMHG

## 2023-06-22 DIAGNOSIS — Z01.419 ENCOUNTER FOR GYNECOLOGICAL EXAMINATION (GENERAL) (ROUTINE) WITHOUT ABNORMAL FINDINGS: ICD-10-CM

## 2023-06-22 DIAGNOSIS — Z85.42 HISTORY OF ENDOMETRIAL CANCER: ICD-10-CM

## 2023-06-22 PROCEDURE — 3074F SYST BP LT 130 MM HG: CPT | Performed by: STUDENT IN AN ORGANIZED HEALTH CARE EDUCATION/TRAINING PROGRAM

## 2023-06-22 PROCEDURE — 3078F DIAST BP <80 MM HG: CPT | Performed by: STUDENT IN AN ORGANIZED HEALTH CARE EDUCATION/TRAINING PROGRAM

## 2023-06-22 PROCEDURE — 99387 INIT PM E/M NEW PAT 65+ YRS: CPT | Performed by: STUDENT IN AN ORGANIZED HEALTH CARE EDUCATION/TRAINING PROGRAM

## 2023-06-22 ASSESSMENT — FIBROSIS 4 INDEX: FIB4 SCORE: 1.21

## 2023-06-22 NOTE — PROGRESS NOTES
Patient is new here. She is here for Annual Exam. She had a Mammo done on 1/11/23 and she has had a total hysterectomy in March 2010.

## 2023-06-23 NOTE — PROGRESS NOTES
ANNUAL GYNECOLOGY VISIT    HPI:  Patient is a 68 y.o.  who presents for her annual exam. She had a hysterectomy/staging in Mar 2010 for stage I endometrial cancer.  She did not receive any chemo/radiation.  She denies any issues currently.  No vaginal bleeding or dryness.  Denies any bladder/bowel changes. No constipation, changes in appetite or early satiety.  She is due for colonoscopy at the end of this year.  She is up to date on her mammogram.      PREVENTIVE CARE:  Immunization History   Administered Date(s) Administered    Influenza Vaccine Quad Inj (Pf) 10/26/2018, 10/12/2022    PFIZER PURPLE CAP SARS-COV-2 VACCINATION (12+) 2021, 2021, 2022    Tdap Vaccine 2014     Last Mammogram: 2023  Taking Calcium/Vit D Supp: Yes      Last DEXA: 2022      CANCER RISK ASSESSMENT:  Family history of:   - Breast cancer: yes - two maternal cousins   - Ovarian cancer: no   - Uterine cancer: *Yes - personal history*   - Colon cancer: yes - paternal uncle at age 57    GYN HX and ROS:   Last Pap:   No flowsheet data found.  Hx Mod or Severe Dysplasia : No  Age at Menopause: 54  Sexually Active: not currently    Postmenopausal bleeding: No  Sexual problems: No  Significant pelvic pain: No  Bothersome menopausal symptoms: No      ROS:    Positive ROS: none  General: She denies excessive fatigue, weakness, unexplained weight loss or gain, abnormal thirst, unexplained fever/chills.  Neurologic: She denies fainting spells, convulsions, dizzy spells, frequent severe headaches, depression or anxiety or vision changes.  HEENT: She denies unusual skin moles, persistent swollen glands, pain or stiff neck, goiter or lump in her neck.  Heart / Lung: She denies persistent cough, shortness of breath, severe chest pain or recurrent heart flutters.  Breast: She denies breast discharge, pain, lump or lump under her arm.  Gastrointestinal: She denies bloody stools, loss of appetite, change in bowel  habits, constipation, diarrhea, nausea, vomiting or persistent abdominal pain.  Urinary: She denies dysuria, urgency, frequency, incontinence, hematuria, kidney or bladder infections.  Extremeties: She denies joint pain, persistently swollen ankles or recurrent leg cramps.        OBSTETRIC HISTORY:  OB History    Para Term  AB Living   3 3 3     3   SAB IAB Ectopic Molar Multiple Live Births             3      # Outcome Date GA Lbr Danish/2nd Weight Sex Delivery Anes PTL Lv   3 Term 88 40w0d  7 lb M Non-Spontane  N ELSA   2 Term 84 40w0d  7 lb F Non-Spontane  N ELSA   1 Term 80 40w0d  7 lb M   N ELSA       MEDICAL HISTORY:  Past Medical History:   Diagnosis Date    Diverticulosis of colon     Endometrial cancer (HCC) 03/10/2010    JACKLYN/BSO - ADENOcarcinoma, endometrioid type, Grade I    Hemorrhoids     internal and external seen on colonscopy     HSV-1 infection     Insomnia     Migraine     Osteopenia     Rotator cuff dysfunction     Thyroid disease     Vitamin D deficiency        MEDICATIONS:  Current Outpatient Medications on File Prior to Visit   Medication Sig Dispense Refill    sumatriptan (IMITREX) 100 MG tablet Take 1 Tablet by mouth one time as needed for Migraine. TAKE 1 TABLET BY MOUTH ONCE AS NEEDED FOR MIGRAINE FOR UP TO 1 DOSE 10 Tablet 6    fluocinonide (LIDEX) 0.05 % Ointment       tacrolimus (PROTOPIC) 0.1 % Ointment       Cholecalciferol (VITAMIN D) 2000 UNIT Tab Take  by mouth every day.      Naproxen Sodium (ALEVE PO) Take  by mouth.       No current facility-administered medications on file prior to visit.       ALLERGIES / REACTIONS:  Allergies   Allergen Reactions    Nkda [No Known Drug Allergy]        FAMILY HISTORY:  Family History   Problem Relation Age of Onset    Heart Disease Mother     Hypertension Mother     Alcohol/Drug Mother     GI Disease Mother         colon polyps    Arthritis Mother     Hypertension Father     Alcohol/Drug Father     Heart Disease  Father         cabg,valve replaced,carotid    Diabetes Father     GI Disease Father         colon polyps    Psychiatric Illness Sister     Heart Disease Sister         MI    Cancer Paternal Uncle     Cancer Brother         thyroid    Diabetes Maternal Grandmother     Cancer Other         maternal cousin with breast ca       SURGICAL HISTORY:  Past Surgical History:   Procedure Laterality Date    ABDOMINAL HYSTERECTOMY TOTAL  03/09/2010    endometrial cancer    EYE SURGERY         SOCIAL HISTORY:  Social History     Socioeconomic History    Marital status:      Spouse name: Not on file    Number of children: Not on file    Years of education: Not on file    Highest education level: Associate degree: occupational, technical, or vocational program   Occupational History    Not on file   Tobacco Use    Smoking status: Never    Smokeless tobacco: Never   Vaping Use    Vaping Use: Never used   Substance and Sexual Activity    Alcohol use: Yes     Alcohol/week: 0.0 oz     Comment: occasional    Drug use: Never    Sexual activity: Not Currently   Other Topics Concern    Not on file   Social History Narrative    Not on file     Social Determinants of Health     Financial Resource Strain: Low Risk  (12/20/2022)    Overall Financial Resource Strain (CARDIA)     Difficulty of Paying Living Expenses: Not hard at all   Food Insecurity: No Food Insecurity (12/20/2022)    Hunger Vital Sign     Worried About Running Out of Food in the Last Year: Never true     Ran Out of Food in the Last Year: Never true   Transportation Needs: No Transportation Needs (12/20/2022)    PRAPARE - Transportation     Lack of Transportation (Medical): No     Lack of Transportation (Non-Medical): No   Physical Activity: Insufficiently Active (12/20/2022)    Exercise Vital Sign     Days of Exercise per Week: 4 days     Minutes of Exercise per Session: 20 min   Stress: Unknown (12/20/2022)    [employer] [place] [employer] - Occupational  Stress Questionnaire     Feeling of Stress : Patient refused   Social Connections: Socially Integrated (12/20/2022)    Social Connection and Isolation Panel [NHANES]     Frequency of Communication with Friends and Family: More than three times a week     Frequency of Social Gatherings with Friends and Family: Twice a week     Attends Confucianism Services: More than 4 times per year     Active Member of Clubs or Organizations: Yes     Attends Club or Organization Meetings: More than 4 times per year     Marital Status:    Intimate Partner Violence: Not on file   Housing Stability: Low Risk  (12/20/2022)    Housing Stability Vital Sign     Unable to Pay for Housing in the Last Year: No     Number of Places Lived in the Last Year: 1     Unstable Housing in the Last Year: No         PHYSICAL EXAMINATION:  Vital Signs:   Vitals:    06/22/23 1340   BP: (!) 89/62   BP Location: Left arm   Patient Position: Sitting   BP Cuff Size: Adult   Weight: 154 lb 12.8 oz     Body mass index is 26.57 kg/m².  Constitutional: The patient is well developed and well nourished.  Psychiatric: Patient is oriented to time place and person.   Skin: No rash observed.  Neck: Appears symmetric. There are no masses or adenopathy present.  Cardiovascular: Regular rate and rhythm without murmur.  Lungs: Clear to ausculation.  Breast: Inspection reveals no asymetry or nipple discharge, no skin thickening, dimpling or erythema.  Palpation demonstrates no masses.  Abdomen: Soft, non-tender.  Pelvic Exam:     Vulva: External female genitalia within normal limits. No lesions    Urethra - no lesions, no erythema    Vagina: moist, pale pink, decreased ruggae    Cervix, uterus, ovaries: surgically absent  Pap Smear Performed: {No  Extremeties: Legs are symmetric and without tenderness. There is no edema present.    LABS:  TSH   Date Value Ref Range Status   01/12/2022 2.040 0.450 - 4.500 uIU/mL Final     HDL (mg/dL)   Date Value   03/07/2023 67    12/10/2012 67     LDL (mg/dL)   Date Value   2019 113 (H)   12/10/2012 94       ASSESSMENT AND PLAN:  68 y.o. .here for annual exam    1. Encounter for gynecological examination (general) (routine) without abnormal findings  -- breast and pelvic exams benign  -- up to date on mammo  -- due for colonoscopy in Dec 2023  -- all questions answered    2. History of endometrial cancer  -- see above    Follow up: Annually or PRN    Lenny Carrizales DO, FACOG

## 2023-06-26 ENCOUNTER — APPOINTMENT (RX ONLY)
Dept: URBAN - METROPOLITAN AREA CLINIC 35 | Facility: CLINIC | Age: 68
Setting detail: DERMATOLOGY
End: 2023-06-26

## 2023-06-26 DIAGNOSIS — L30.8 OTHER SPECIFIED DERMATITIS: ICD-10-CM

## 2023-06-26 DIAGNOSIS — L82.1 OTHER SEBORRHEIC KERATOSIS: ICD-10-CM

## 2023-06-26 PROBLEM — D48.5 NEOPLASM OF UNCERTAIN BEHAVIOR OF SKIN: Status: ACTIVE | Noted: 2023-06-26

## 2023-06-26 PROCEDURE — ? TREATMENT REGIMEN

## 2023-06-26 PROCEDURE — ? DIAGNOSIS COMMENT

## 2023-06-26 PROCEDURE — ? COUNSELING

## 2023-06-26 PROCEDURE — ? PRESCRIPTION

## 2023-06-26 PROCEDURE — ? OBSERVATION

## 2023-06-26 PROCEDURE — 99214 OFFICE O/P EST MOD 30 MIN: CPT

## 2023-06-26 RX ORDER — FLUOCINONIDE 0.5 MG/G
1 OINTMENT TOPICAL BID
Qty: 60 | Refills: 3 | Status: ERX

## 2023-06-26 RX ORDER — TACROLIMUS 1 MG/G
1 OINTMENT TOPICAL BID
Qty: 30 | Refills: 4 | Status: ERX

## 2023-06-26 RX ORDER — RUXOLITINIB 15 MG/G
1 CREAM TOPICAL TWICE DAILY
Qty: 60 | Refills: 6 | Status: ERX | COMMUNITY
Start: 2023-06-26

## 2023-06-26 RX ADMIN — RUXOLITINIB 1: 15 CREAM TOPICAL at 00:00

## 2023-06-26 ASSESSMENT — LOCATION ZONE DERM
LOCATION ZONE: HAND
LOCATION ZONE: FACE

## 2023-06-26 ASSESSMENT — LOCATION SIMPLE DESCRIPTION DERM
LOCATION SIMPLE: RIGHT CHEEK
LOCATION SIMPLE: LEFT HAND
LOCATION SIMPLE: RIGHT HAND

## 2023-06-26 ASSESSMENT — LOCATION DETAILED DESCRIPTION DERM
LOCATION DETAILED: LEFT RADIAL DORSAL HAND
LOCATION DETAILED: RIGHT RADIAL DORSAL HAND
LOCATION DETAILED: RIGHT SUPERIOR MEDIAL MALAR CHEEK

## 2023-06-26 NOTE — PROCEDURE: DIAGNOSIS COMMENT
Comment: Pt counseled in moisturizer regularly, especially after washing hands.  Only use non soap cleanser to wash/ bathe. Advised to wear cotton gloves under plastic gloves when using dish soap. Pt counseled in wearing gloves to sleep with moisturizer. Pt used to have triancinalome rx from dermatology, states she did not like how worked.\\nPt counseled in using flucocinonide ointment twice a day for fourteen days and tacrolimus ointment twice a day for minor flares
Detail Level: Simple
Render Risk Assessment In Note?: no

## 2023-06-26 NOTE — PROCEDURE: TREATMENT REGIMEN
Show Skinceuticals Line: Yes
Action 4: Continue
Initiate Regimen: - Opzelura 1.5 % topical cream. Apply to eczematous patches twice a day
Continue Regimen: - tacrolimus 0.1 % topical ointment. Apply twice daily to affected areas on face and hands daily when not flaring\\n\\n- fluocinonide 0.05 % topical ointment. Apply to hands for flares of eczema as needed for up to 2 weeks max. Do not apply to face, groin, axilla.
Detail Level: Zone

## 2023-06-26 NOTE — PROCEDURE: MIPS QUALITY
Quality 111:Pneumonia Vaccination Status For Older Adults: Patient received any pneumococcal conjugate or polysaccharide vaccine on or after their 60th birthday and before the end of the measurement period
Quality 226: Preventive Care And Screening: Tobacco Use: Screening And Cessation Intervention: Patient screened for tobacco use and is an ex/non-smoker
Quality 130: Documentation Of Current Medications In The Medical Record: Current Medications Documented
Quality 402: Tobacco Use And Help With Quitting Among Adolescents: Patient screened for tobacco and never smoked
Detail Level: Detailed
Quality 431: Preventive Care And Screening: Unhealthy Alcohol Use - Screening: Patient identified as an unhealthy alcohol user when screened for unhealthy alcohol use using a systematic screening method and received brief counseling

## 2023-06-27 RX ORDER — FLUOCINONIDE 0.5 MG/G
1 OINTMENT TOPICAL BID
Qty: 60 | Refills: 3 | Status: ERX

## 2023-07-05 ENCOUNTER — HOSPITAL ENCOUNTER (OUTPATIENT)
Dept: RADIOLOGY | Facility: MEDICAL CENTER | Age: 68
End: 2023-07-05
Payer: COMMERCIAL

## 2023-07-27 ENCOUNTER — TELEPHONE (OUTPATIENT)
Dept: MEDICAL GROUP | Facility: MEDICAL CENTER | Age: 68
End: 2023-07-27
Payer: COMMERCIAL

## 2023-07-27 LAB
25(OH)D3+25(OH)D2 SERPL-MCNC: 46.4 NG/ML (ref 30–100)
ALBUMIN SERPL-MCNC: 4.6 G/DL (ref 3.9–4.9)
ALP BONE CFR SERPL: 39 % (ref 14–68)
ALP INTEST CFR SERPL: 0 % (ref 0–18)
ALP LIVER CFR SERPL: 61 % (ref 18–85)
ALP SERPL-CCNC: 123 IU/L (ref 44–121)
ALT SERPL-CCNC: 37 IU/L (ref 0–32)
AST SERPL-CCNC: 21 IU/L (ref 0–40)
BILIRUB DIRECT SERPL-MCNC: 0.12 MG/DL (ref 0–0.4)
BILIRUB SERPL-MCNC: 0.5 MG/DL (ref 0–1.2)
ENDOMYSIUM IGA SER QL: NEGATIVE
GGT SERPL-CCNC: 104 IU/L (ref 0–60)
IGA SERPL-MCNC: 177 MG/DL (ref 87–352)
MITOCHONDRIA M2 IGG SER-ACNC: <20 UNITS (ref 0–20)
PROT SERPL-MCNC: 6.7 G/DL (ref 6–8.5)
PTH-INTACT SERPL-MCNC: 42 PG/ML (ref 15–65)
TSH SERPL DL<=0.005 MIU/L-ACNC: 1.46 UIU/ML (ref 0.45–4.5)
TTG IGA SER-ACNC: <2 U/ML (ref 0–3)

## 2023-07-28 NOTE — TELEPHONE ENCOUNTER
Notified with results, alkaline phosphatase decreased, essentially normal ranges now, ALT decreased, GGT still elevated, component of hepatic steatosis, remainder of labs negative.

## 2023-08-17 ENCOUNTER — OFFICE VISIT (OUTPATIENT)
Dept: MEDICAL GROUP | Facility: MEDICAL CENTER | Age: 68
End: 2023-08-17
Payer: COMMERCIAL

## 2023-08-17 VITALS
BODY MASS INDEX: 24.27 KG/M2 | WEIGHT: 151 LBS | OXYGEN SATURATION: 96 % | DIASTOLIC BLOOD PRESSURE: 70 MMHG | HEIGHT: 66 IN | HEART RATE: 110 BPM | TEMPERATURE: 99.6 F | SYSTOLIC BLOOD PRESSURE: 104 MMHG | RESPIRATION RATE: 20 BRPM

## 2023-08-17 DIAGNOSIS — H72.92 PERFORATION OF LEFT TYMPANIC MEMBRANE: ICD-10-CM

## 2023-08-17 DIAGNOSIS — H92.02 LEFT EAR PAIN: ICD-10-CM

## 2023-08-17 PROCEDURE — 3078F DIAST BP <80 MM HG: CPT | Performed by: PHYSICIAN ASSISTANT

## 2023-08-17 PROCEDURE — 3074F SYST BP LT 130 MM HG: CPT | Performed by: PHYSICIAN ASSISTANT

## 2023-08-17 PROCEDURE — 99214 OFFICE O/P EST MOD 30 MIN: CPT | Performed by: PHYSICIAN ASSISTANT

## 2023-08-17 RX ORDER — PREDNISONE 20 MG/1
TABLET ORAL
Qty: 10 TABLET | Refills: 0 | Status: SHIPPED | OUTPATIENT
Start: 2023-08-17 | End: 2023-12-21

## 2023-08-17 RX ORDER — AMOXICILLIN AND CLAVULANATE POTASSIUM 875; 125 MG/1; MG/1
TABLET, FILM COATED ORAL
Qty: 20 TABLET | Refills: 0 | Status: SHIPPED | OUTPATIENT
Start: 2023-08-17 | End: 2023-12-21

## 2023-08-17 ASSESSMENT — FIBROSIS 4 INDEX: FIB4 SCORE: 0.94

## 2023-08-17 ASSESSMENT — PATIENT HEALTH QUESTIONNAIRE - PHQ9: CLINICAL INTERPRETATION OF PHQ2 SCORE: 0

## 2023-08-18 NOTE — PROGRESS NOTES
"Subjective:   Adrienne Morales is a 68 y.o. female here today for     HPI:Patient is here to discuss:6 ear pain    Patient comes in with left ear pain.  Partially sustained or 6 ruptured eardrum.  Was [place] and had congestion for about a week prior.  Patient was going up in altitude already and had significant serious pain with crackling sensation and then had severe pain pain subsided and then she had blood coming from the ear.  Denies fevers chills but does have rhinorrhea with clear mucus present        ROS   No headaches, chest pain, no shortness of breath, abdominal pain, nausea, or vomiting.  All other systems were reviewed and are negative or noted as positive in the HPI.     Objective:     /70   Pulse (!) 110   Temp 37.6 °C (99.6 °F) (Temporal)   Resp 20   Ht 1.667 m (5' 5.63\")   Wt 68.5 kg (151 lb)   SpO2 96%  Body mass index is 24.65 kg/m².     Physical Exam:  General: Patient appears well-nourished, well-hydrated, nontoxic  HEENT, normocephalic atraumatic, PERRLA, extraocular movements intact, nares are patent and clear.  Left tympanic membrane is partially scared by dried blood in the ear canal.  Appears to have area of perforation on the TM.  Neck: No visible masses, thyromegaly or abnormalities noted  Cardiovascular.  Sitting comfortably without visible signs of edema  Lungs: No cyanosis noted, nondyspneic  Skin: Well perfused without evidence of rash or lesions  Neurological: Cranial nerves II through XII intact, normal gait  Musculoskeletal: Normal range of motion, normal strength and no deficit noted     Clinical Course/Lab Analysis:        Assessment and Plan:   The following treatment plan was discussed.  Signs and symptoms for which to return were discussed with patient at length.  Patient verbalized understanding.    1. Perforation of left tympanic membrane  predniSONE (DELTASONE) 20 MG Tab    Referral to ENT    Referral to ENT    amoxicillin-clavulanate (AUGMENTIN) [id number] " MG Tab      2. Left ear pain  predniSONE (DELTASONE) 20 MG Tab    Referral to ENT    Referral to ENT    amoxicillin-clavulanate (AUGMENTIN) 875-125 MG Tab          Both are new and unstable conditions.  We will treat with a steroid and antibiotic given that her eardrum is partially scarred and I put in referral to ENT.  Please monitor your weight or put any other drops in the ear.      Followup: With us if symptoms worsen despite treatment    Please note that this dictation was created using voice recognition software. I have made every reasonable attempt to correct obvious errors, but I expect that there are errors of grammar and possibly content that I did not discover before finalizing the note.

## 2023-09-11 ENCOUNTER — APPOINTMENT (RX ONLY)
Dept: URBAN - METROPOLITAN AREA CLINIC 35 | Facility: CLINIC | Age: 68
Setting detail: DERMATOLOGY
End: 2023-09-11

## 2023-09-11 DIAGNOSIS — D18.0 HEMANGIOMA: ICD-10-CM

## 2023-09-11 DIAGNOSIS — Z71.89 OTHER SPECIFIED COUNSELING: ICD-10-CM

## 2023-09-11 DIAGNOSIS — L44.8 OTHER SPECIFIED PAPULOSQUAMOUS DISORDERS: ICD-10-CM

## 2023-09-11 DIAGNOSIS — L81.4 OTHER MELANIN HYPERPIGMENTATION: ICD-10-CM

## 2023-09-11 DIAGNOSIS — L82.1 OTHER SEBORRHEIC KERATOSIS: ICD-10-CM

## 2023-09-11 DIAGNOSIS — D22 MELANOCYTIC NEVI: ICD-10-CM

## 2023-09-11 PROBLEM — D18.01 HEMANGIOMA OF SKIN AND SUBCUTANEOUS TISSUE: Status: ACTIVE | Noted: 2023-09-11

## 2023-09-11 PROBLEM — D22.39 MELANOCYTIC NEVI OF OTHER PARTS OF FACE: Status: ACTIVE | Noted: 2023-09-11

## 2023-09-11 PROBLEM — D22.5 MELANOCYTIC NEVI OF TRUNK: Status: ACTIVE | Noted: 2023-09-11

## 2023-09-11 PROCEDURE — ? COUNSELING

## 2023-09-11 PROCEDURE — ? SUNSCREEN RECOMMENDATIONS

## 2023-09-11 PROCEDURE — 99213 OFFICE O/P EST LOW 20 MIN: CPT

## 2023-09-11 PROCEDURE — ? OBSERVATION AND MEASURE

## 2023-09-11 ASSESSMENT — LOCATION SIMPLE DESCRIPTION DERM
LOCATION SIMPLE: LOWER BACK
LOCATION SIMPLE: CHEST
LOCATION SIMPLE: ABDOMEN
LOCATION SIMPLE: LEFT CALF
LOCATION SIMPLE: RIGHT CHEEK
LOCATION SIMPLE: RIGHT POSTERIOR UPPER ARM
LOCATION SIMPLE: RIGHT UPPER ARM
LOCATION SIMPLE: LEFT POSTERIOR UPPER ARM
LOCATION SIMPLE: RIGHT UPPER BACK

## 2023-09-11 ASSESSMENT — LOCATION DETAILED DESCRIPTION DERM
LOCATION DETAILED: SUPERIOR LUMBAR SPINE
LOCATION DETAILED: RIGHT CENTRAL MALAR CHEEK
LOCATION DETAILED: UPPER STERNUM
LOCATION DETAILED: EPIGASTRIC SKIN
LOCATION DETAILED: LEFT PROXIMAL CALF
LOCATION DETAILED: RIGHT SUPERIOR MEDIAL UPPER BACK
LOCATION DETAILED: LEFT DISTAL POSTERIOR UPPER ARM
LOCATION DETAILED: RIGHT ANTERIOR PROXIMAL UPPER ARM
LOCATION DETAILED: RIGHT SUPERIOR UPPER BACK
LOCATION DETAILED: RIGHT PROXIMAL POSTERIOR UPPER ARM
LOCATION DETAILED: RIGHT MEDIAL MALAR CHEEK

## 2023-09-11 ASSESSMENT — LOCATION ZONE DERM
LOCATION ZONE: LEG
LOCATION ZONE: ARM
LOCATION ZONE: FACE
LOCATION ZONE: TRUNK

## 2023-09-11 NOTE — PROCEDURE: MIPS QUALITY
Quality 111:Pneumonia Vaccination Status For Older Adults: Patient received any pneumococcal conjugate or polysaccharide vaccine on or after their 60th birthday and before the end of the measurement period
Quality 226: Preventive Care And Screening: Tobacco Use: Screening And Cessation Intervention: Patient screened for tobacco use and is an ex/non-smoker
Detail Level: Detailed
Quality 130: Documentation Of Current Medications In The Medical Record: Current Medications Documented
Quality 402: Tobacco Use And Help With Quitting Among Adolescents: Patient screened for tobacco and is an ex-smoker

## 2023-11-17 ENCOUNTER — TELEPHONE (OUTPATIENT)
Dept: MEDICAL GROUP | Facility: MEDICAL CENTER | Age: 68
End: 2023-11-17
Payer: COMMERCIAL

## 2023-11-18 NOTE — TELEPHONE ENCOUNTER
Please notify the patient that her mammogram done at [facility] is negative, she can repeat the mammogram in 1 year.

## 2023-11-20 ENCOUNTER — TELEPHONE (OUTPATIENT)
Dept: MEDICAL GROUP | Facility: MEDICAL CENTER | Age: 68
End: 2023-11-20
Payer: COMMERCIAL

## 2023-11-20 NOTE — TELEPHONE ENCOUNTER
----- Message from Feliciano Olson M.D. sent at 11/17/2023  7:10 PM PST -----  Please notify the patient that her mammogram done at [facility] is negative, she can repeat the mammogram in 1 year.

## 2023-11-22 PROBLEM — Z78.0 POST-MENOPAUSAL: Status: ACTIVE | Noted: 2023-11-22

## 2023-11-27 ENCOUNTER — APPOINTMENT (RX ONLY)
Dept: URBAN - METROPOLITAN AREA CLINIC 35 | Facility: CLINIC | Age: 68
Setting detail: DERMATOLOGY
End: 2023-11-27

## 2023-11-27 DIAGNOSIS — L50.3 DERMATOGRAPHIC URTICARIA: ICD-10-CM

## 2023-11-27 DIAGNOSIS — Z71.89 OTHER SPECIFIED COUNSELING: ICD-10-CM

## 2023-11-27 DIAGNOSIS — L50.1 IDIOPATHIC URTICARIA: ICD-10-CM

## 2023-11-27 DIAGNOSIS — L30.9 DERMATITIS, UNSPECIFIED: ICD-10-CM | Status: INADEQUATELY CONTROLLED

## 2023-11-27 PROCEDURE — ? TREATMENT REGIMEN

## 2023-11-27 PROCEDURE — ? COUNSELING

## 2023-11-27 PROCEDURE — ? SUNSCREEN RECOMMENDATIONS

## 2023-11-27 PROCEDURE — ? PRESCRIPTION

## 2023-11-27 PROCEDURE — 99214 OFFICE O/P EST MOD 30 MIN: CPT

## 2023-11-27 RX ORDER — TRIAMCINOLONE ACETONIDE 1 MG/G
1 CREAM TOPICAL BID
Qty: 453.6 | Refills: 1 | Status: ERX | COMMUNITY
Start: 2023-11-27

## 2023-11-27 RX ADMIN — TRIAMCINOLONE ACETONIDE 1: 1 CREAM TOPICAL at 00:00

## 2023-11-27 ASSESSMENT — LOCATION ZONE DERM: LOCATION ZONE: TRUNK

## 2023-11-27 ASSESSMENT — LOCATION DETAILED DESCRIPTION DERM
LOCATION DETAILED: LEFT INFERIOR UPPER BACK
LOCATION DETAILED: RIGHT MID-UPPER BACK
LOCATION DETAILED: LEFT MID-UPPER BACK

## 2023-11-27 ASSESSMENT — LOCATION SIMPLE DESCRIPTION DERM
LOCATION SIMPLE: RIGHT UPPER BACK
LOCATION SIMPLE: LEFT UPPER BACK

## 2023-11-27 ASSESSMENT — ITCH NUMERIC RATING SCALE: HOW SEVERE IS YOUR ITCHING?: 8

## 2023-11-27 ASSESSMENT — BSA RASH: BSA RASH: 5

## 2023-11-27 NOTE — PROCEDURE: TREATMENT REGIMEN
Initiate Treatment: - triamcinolone acetonide 0.1 % topical cream. Apply on affected skin BID as needed for 2 weeks alternating with 1 week off., repeat as needed. Never on face, underarms or groin. Taper to once a day, then every other day\\n\\n- Zyrtec 1-2 pills AM & PM
Detail Level: Zone

## 2023-12-21 ENCOUNTER — TELEPHONE (OUTPATIENT)
Dept: MEDICAL GROUP | Facility: MEDICAL CENTER | Age: 68
End: 2023-12-21

## 2023-12-21 ENCOUNTER — OFFICE VISIT (OUTPATIENT)
Dept: MEDICAL GROUP | Facility: MEDICAL CENTER | Age: 68
End: 2023-12-21
Payer: COMMERCIAL

## 2023-12-21 VITALS
OXYGEN SATURATION: 96 % | HEART RATE: 84 BPM | HEIGHT: 65 IN | TEMPERATURE: 96.4 F | BODY MASS INDEX: 25.91 KG/M2 | RESPIRATION RATE: 16 BRPM | DIASTOLIC BLOOD PRESSURE: 70 MMHG | SYSTOLIC BLOOD PRESSURE: 118 MMHG | WEIGHT: 155.5 LBS

## 2023-12-21 DIAGNOSIS — R00.2 PALPITATIONS: ICD-10-CM

## 2023-12-21 DIAGNOSIS — Z00.00 PREVENTATIVE HEALTH CARE: Chronic | ICD-10-CM

## 2023-12-21 DIAGNOSIS — Z85.42 HISTORY OF ENDOMETRIAL CANCER: ICD-10-CM

## 2023-12-21 DIAGNOSIS — Z86.16 HISTORY OF COVID-19: ICD-10-CM

## 2023-12-21 DIAGNOSIS — Z12.11 COLON CANCER SCREENING: ICD-10-CM

## 2023-12-21 PROCEDURE — 3078F DIAST BP <80 MM HG: CPT | Performed by: INTERNAL MEDICINE

## 2023-12-21 PROCEDURE — 99397 PER PM REEVAL EST PAT 65+ YR: CPT | Performed by: INTERNAL MEDICINE

## 2023-12-21 PROCEDURE — 3074F SYST BP LT 130 MM HG: CPT | Performed by: INTERNAL MEDICINE

## 2023-12-21 RX ORDER — CETIRIZINE HYDROCHLORIDE 10 MG/1
TABLET ORAL
COMMUNITY
Start: 2023-11-27

## 2023-12-21 RX ORDER — ESTRADIOL 0.03 MG/D
FILM, EXTENDED RELEASE TRANSDERMAL
COMMUNITY
Start: 2023-12-19

## 2023-12-21 RX ORDER — TRIAMCINOLONE ACETONIDE 1 MG/G
CREAM TOPICAL
COMMUNITY
Start: 2023-11-27

## 2023-12-21 RX ORDER — ACYCLOVIR 50 MG/G
OINTMENT TOPICAL
COMMUNITY
Start: 2023-10-13

## 2023-12-21 SDOH — HEALTH STABILITY: MENTAL HEALTH
STRESS IS WHEN SOMEONE FEELS TENSE, NERVOUS, ANXIOUS, OR CAN'T SLEEP AT NIGHT BECAUSE THEIR MIND IS TROUBLED. HOW STRESSED ARE YOU?: TO SOME EXTENT

## 2023-12-21 SDOH — ECONOMIC STABILITY: HOUSING INSECURITY: IN THE LAST 12 MONTHS, HOW MANY PLACES HAVE YOU LIVED?: 1

## 2023-12-21 SDOH — ECONOMIC STABILITY: FOOD INSECURITY: WITHIN THE PAST 12 MONTHS, THE FOOD YOU BOUGHT JUST DIDN'T LAST AND YOU DIDN'T HAVE MONEY TO GET MORE.: NEVER TRUE

## 2023-12-21 SDOH — ECONOMIC STABILITY: INCOME INSECURITY: IN THE LAST 12 MONTHS, WAS THERE A TIME WHEN YOU WERE NOT ABLE TO PAY THE MORTGAGE OR RENT ON TIME?: NO

## 2023-12-21 SDOH — ECONOMIC STABILITY: FOOD INSECURITY: WITHIN THE PAST 12 MONTHS, YOU WORRIED THAT YOUR FOOD WOULD RUN OUT BEFORE YOU GOT MONEY TO BUY MORE.: NEVER TRUE

## 2023-12-21 SDOH — HEALTH STABILITY: PHYSICAL HEALTH: ON AVERAGE, HOW MANY MINUTES DO YOU ENGAGE IN EXERCISE AT THIS LEVEL?: 20 MIN

## 2023-12-21 SDOH — ECONOMIC STABILITY: INCOME INSECURITY: HOW HARD IS IT FOR YOU TO PAY FOR THE VERY BASICS LIKE FOOD, HOUSING, MEDICAL CARE, AND HEATING?: NOT HARD AT ALL

## 2023-12-21 SDOH — HEALTH STABILITY: PHYSICAL HEALTH: ON AVERAGE, HOW MANY DAYS PER WEEK DO YOU ENGAGE IN MODERATE TO STRENUOUS EXERCISE (LIKE A BRISK WALK)?: 5 DAYS

## 2023-12-21 ASSESSMENT — SOCIAL DETERMINANTS OF HEALTH (SDOH)
IN A TYPICAL WEEK, HOW MANY TIMES DO YOU TALK ON THE PHONE WITH FAMILY, FRIENDS, OR NEIGHBORS?: MORE THAN THREE TIMES A WEEK
HOW OFTEN DO YOU GET TOGETHER WITH FRIENDS OR RELATIVES?: TWICE A WEEK
HOW OFTEN DO YOU ATTEND CHURCH OR RELIGIOUS SERVICES?: MORE THAN 4 TIMES PER YEAR
HOW HARD IS IT FOR YOU TO PAY FOR THE VERY BASICS LIKE FOOD, HOUSING, MEDICAL CARE, AND HEATING?: NOT HARD AT ALL
HOW OFTEN DO YOU HAVE A DRINK CONTAINING ALCOHOL: 2-3 TIMES A WEEK
HOW OFTEN DO YOU HAVE SIX OR MORE DRINKS ON ONE OCCASION: NEVER
DO YOU BELONG TO ANY CLUBS OR ORGANIZATIONS SUCH AS CHURCH GROUPS UNIONS, FRATERNAL OR ATHLETIC GROUPS, OR SCHOOL GROUPS?: YES
HOW OFTEN DO YOU ATTENT MEETINGS OF THE CLUB OR ORGANIZATION YOU BELONG TO?: 1 TO 4 TIMES PER YEAR
HOW MANY DRINKS CONTAINING ALCOHOL DO YOU HAVE ON A TYPICAL DAY WHEN YOU ARE DRINKING: 1 OR 2
IN A TYPICAL WEEK, HOW MANY TIMES DO YOU TALK ON THE PHONE WITH FAMILY, FRIENDS, OR NEIGHBORS?: MORE THAN THREE TIMES A WEEK
HOW OFTEN DO YOU GET TOGETHER WITH FRIENDS OR RELATIVES?: TWICE A WEEK
HOW OFTEN DO YOU ATTENT MEETINGS OF THE CLUB OR ORGANIZATION YOU BELONG TO?: 1 TO 4 TIMES PER YEAR
HOW OFTEN DO YOU ATTEND CHURCH OR RELIGIOUS SERVICES?: MORE THAN 4 TIMES PER YEAR
WITHIN THE PAST 12 MONTHS, YOU WORRIED THAT YOUR FOOD WOULD RUN OUT BEFORE YOU GOT THE MONEY TO BUY MORE: NEVER TRUE
DO YOU BELONG TO ANY CLUBS OR ORGANIZATIONS SUCH AS CHURCH GROUPS UNIONS, FRATERNAL OR ATHLETIC GROUPS, OR SCHOOL GROUPS?: YES

## 2023-12-21 ASSESSMENT — LIFESTYLE VARIABLES
AUDIT-C TOTAL SCORE: 3
HOW MANY STANDARD DRINKS CONTAINING ALCOHOL DO YOU HAVE ON A TYPICAL DAY: 1 OR 2
HOW OFTEN DO YOU HAVE SIX OR MORE DRINKS ON ONE OCCASION: NEVER
SKIP TO QUESTIONS 9-10: 1
HOW OFTEN DO YOU HAVE A DRINK CONTAINING ALCOHOL: 2-3 TIMES A WEEK

## 2023-12-21 ASSESSMENT — FIBROSIS 4 INDEX: FIB4 SCORE: 0.94

## 2023-12-21 NOTE — PROGRESS NOTES
Subjective     Adrienne Morales is a 68 y.o. female who presents with Annual Exam            HPI      Here for annual exam   Medications, allergies, medical history, surgical history, social history, family history  reviewed and updated  [facility]es [facility] and dr.chang [facility]   Sees [facility]   Sees [facility] on estradiol patch and testosterone cream   Patient keeps active, has been thinking about purchasing treadmill with her , but for now tries to keep active walking 3-4 times per week outdoors 20 minutes, no joint pain when walking, but left knee burning sensation when kneeling on the ground. Still climbs ladders, no difficulty with balance, no falls.  No chest pain, shortness of breath, palpitations with activity.  She occasionally notes a popping sensation in her left chest has happened 3-4 times, no associated with exercise or stress.  No history of arrhythmia.  Does notice that she has right base of thumb discomfort at times when trying to  or open objects.  No sensory changes.  No history of trauma.  Tries to eat healthy, eats out twice per week, fresh fruits and veggies, sweet potatoes, brown rice, chicken and fish, tries to minimize sweets and candies  Water intake, coffee 2 - 3 per day, no tea, rare soda, etoh 0-3 per week   No constipation, no change in urinary pattern, no incontinence of urine, no nocturia  Sleep 7 hours nightly  FH sister heart disease, brothers no change, son lives in [facility] and another son in [place] and has completed a power of       Current Outpatient Medications   Medication Sig Dispense Refill    acyclovir (ZOVIRAX) 5 % Ointment APPLY TO THE AFFECTED AREA EVERY 2 HOURS AS DIRECTED      cetirizine (ZYRTEC ALLERGY) 10 MG Tab       Estradiol 0.025 MG/24HR PATCH BIWEEKLY       triamcinolone acetonide (KENALOG) 0.1 % Cream APPLY ON AFFECTED SKIN TWICE DAILY AS NEEDED FOR 2 WEEKS ALTERNATING WITH 1 WEEK OFF, REPEAT AS NEEDED.  NEVER ON FACE,UNDERARMS OR GROIN. TAPER TO ONCE A DAY, THEN EVERY OTHER DAY      Multiple Vitamins-Minerals (PRESERVISION AREDS 2 PO) PreserVision AREDS 2      predniSONE (DELTASONE) 20 MG Tab Take one pill twice a day for five days 10 Tablet 0    LUTEIN PO       sumatriptan (IMITREX) 100 MG tablet Take 1 Tablet by mouth one time as needed for Migraine. TAKE 1 TABLET BY MOUTH ONCE AS NEEDED FOR MIGRAINE FOR UP TO 1 DOSE 10 Tablet 6    fluocinonide (LIDEX) 0.05 % Ointment       tacrolimus (PROTOPIC) 0.1 % Ointment       Cholecalciferol (VITAMIN D) 2000 UNIT Tab Take  by mouth every day.      Naproxen Sodium (ALEVE PO) Take  by mouth.       No current facility-administered medications for this visit.       thyroid nodule  2/12/22 ultrasound soft tissue neck at [facility] left upper pole nodule 0.8 x 0.7 x 0.6 cm TIRADS 5 suspicious   4/26/22  endocrine note labs ordered, repeat ultrasound thyroid in 6 months to monitor size and progression  9/27/22 tsh 2.2,free t4 1.23,free t3 3.1,TPO 11 per  endocrine  10/7/22  endocrine note repeat thyroid ultrasound one year     Sleep disordered breathing  12/12/19 OPO through preferred see if perhaps she has sleep apnea contributing to migraine headaches  1/6/20 OPO through preferred time less than 88% 23 minutes, low saturation 76%, basal saturation 92%     Preventative health  12/13/13 colon per [facility] diverticulosis repeat 10 years  9/12/14 tdap  12/31/19 hep c ab negative  1/15/20 cologuard negative  12/14/20 declines pneumovax, prevnar, influenza, shingrix vaccines  1/5/22 covid booster  1/5/22 dexa at [facility] LS-0.4,hip-1.8   9/27/22 vit d 44 per  endocrine  11/15/22 mammogram at [facility] negative     Osteopenia  12/10/12 dexa LS -0.9, hip -1.2 per gyn  9/16/14 vit d 28 start 2000 units  10/6/15 dexa LS 0.0,hip -1.8 ;FRAX 9% major,1.0% hip  10/14/15 vit d 32   7/22/16 start 2000 units   10/19/16 vit d 35 on 2000 units done at [facility]  10/20/17 vit d 37  done at [facility]  11/10/17 dexa LS-0.5,hip-2.0;FRAX 10% major,1.5% hip done at [facility]  11/27/18 vit d 42  12/31/19 dexa at [facility] LS-0.5,hip-1.9 done at [facility],FRAX 10.8% major,2.5% hip  1/11/20 vit d 43  1/6/21 vit d 44  1/5/22 dexa at [facility] LS-0.4,hip-1.8  1/13/22 vit d 44  9/27/22 vit d 44 per  endocrine     Insomnia uses Ambien for travel     Impaired glucose metabolism  12/31/19 A1c 5.8%  1/6/21 A1c 5.6%  1/13/22 A1c 5.7%     History of perforated tympanic membrane     History migraine tried Inderal previously but caused low blood pressure  Tried inderal in her 20s, but caused low bp  10/24/17 more migraine recently and declines medication   12/11/18 uses imitrex as needed  12/14/20 on imitrex as needed     History endometrial cancer  3/10 JACKLYN BSO for stage I endometrial cancer   2013 gyn exam  sees twice per year  9/14 sees  gyn  3/7/17 pap per gyn   3/18 saw  gyn   6/22/23 dr.marlow matos gynecology note   11/21/23 [facility] note start estradiol patch 0.025 mg weekly every 3 days and testosterone powder 5 mg/gm apply 0.5 gm topical daily     history covid  1/17/22 patient MyChart message positive home test, will order PCR  1/18/22 covid positive rapid test     Family history cardiovascular disease  Father CABG and sister heart disease  11/20/13 echo normal LV 60%  12/10/13 CT calcium score 0     Dyslipidemia  11/20/13 echo normal LV 60%  11/14/13 chol 191,trig 113,hdl 61,ldl 107  12/10/13 CT calcium score 0  9/16/14 chol 206,trig 91,hdl 74,ldl 114  10/14/15 chol 215,trig 83,hdl 81,ldl 117  10/19/16 chol 189,trig 127,hdl 61,ldl 103 done at [facility]  10/24/17 chol 205,trig 83,hdl 66,ldl 121  11/27/18 chol 204,trig 121,hdl 62,ldl 121  12/31/19 chol 204,trig 130,hdl 65,ldl 113  1/6/21 chol 206,trig 154,hdl 61,ldl 118; 10 year risk 5.75%  1/13/22 chol 213,trig 158,hdl 57,ldl 128; 10 year risk 4.5%     abn liver enzymes  9/15/14 AST 22,ALT 35,alk phos 118,bili 0.3  11/27/18 AST 42,ALT  "50,alk phos 121,bili 0.3  12/31/19 AST 56,ALT 30,alk phos 122,bili 0.4  1/8/20 ultrasound abdomen done at [facility], diffusely echogenic liver likely representing hepatic steatosis, no focal mass, common bile duct within normal limits 0.3 cm, pancreas unremarkable, gallbladder and spleen unremarkable  1/11/20 alk phos 137, acute hepatitis panel negative, iron 108,%sat 30, ferritin 75, SPEP negative, AMA negative, smooth muscle antibody negative  5/22/20 AST 35,ALT 40,GGT 69, alkaline phosphatase 115, liver fraction 58% (18-85%), bone fractions 42% (14-68%), bilirubin 0.2  1/6/21 alk phos 142,AST 39,ALT 73,bili 0.3  4/26/21 AST 66, ALT 89, GGT 95, fibrosis score 0.23 (stage F0-F1), steatosis score 0.65 (S2 moderate steatosis), BANKS 0.25 (N0)  1/13/22 alk phos 133(),bili 0.3,AST 24,ALT 40,GGT 70     Patient Active Problem List   Diagnosis    Preventative health care    Insomnia    History of endometrial cancer    History of migraine    Osteopenia    History of perforated tympanic membrane    Family history of cardiovascular disease    Dyslipidemia    Abnormal liver function    Dermatochalasis    Impaired glucose metabolism    Sleep disorder breathing    History of shingles    History of COVID-19    Thyroid nodule    Skin lesion    Post-menopausal                  Patient Care Team:  Feliciano Olson M.D. as PCP - General (Internal Medicine)      ROS           Objective     /70   Pulse 84   Temp (!) 35.8 °C (96.4 °F) (Temporal)   Resp 16   Ht 1.651 m (5' 5\")   Wt 70.5 kg (155 lb 8 oz)   SpO2 96%   BMI 25.88 kg/m²      Physical Exam  Vitals and nursing note reviewed.   Constitutional:       Appearance: Normal appearance.   HENT:      Head: Normocephalic and atraumatic.      Right Ear: Tympanic membrane and external ear normal.      Left Ear: Tympanic membrane and external ear normal.      Nose: No congestion.   Eyes:      Conjunctiva/sclera: Conjunctivae normal.   Cardiovascular:      Rate and Rhythm: " Normal rate and regular rhythm.      Heart sounds: Normal heart sounds. No murmur heard.  Pulmonary:      Effort: No respiratory distress.      Breath sounds: Normal breath sounds.   Abdominal:      General: There is no distension.   Musculoskeletal:         General: No swelling.      Cervical back: Neck supple. No rigidity.   Lymphadenopathy:      Cervical: No cervical adenopathy.   Skin:     Coloration: Skin is not jaundiced.      Findings: No bruising.   Neurological:      General: No focal deficit present.      Mental Status: She is alert.   Psychiatric:         Mood and Affect: Mood normal.       Left knee normal flexion, extension, no effusion, no crepitus, no popliteal tenderness, no tenderness medial or lateral portion, negative Hayde's  Right thumb normal  strength, no Heberden's nodules, normal  strength, normal sensation                      Assessment & Plan        Assessment  #1 annual exam     #2  Thyroid nodule followed by [facility] had ultrasound at [facility] done, I do not have those records    #3 postmenopausal, on estradiol patch and testosterone cream per endocrinology    #4 dyslipidemia 3/8/23 chol 218,trig 110,hdl 67,ldl 132; 10 year risk 4.9%    #5 impaired glucose metabolism most recent A1c 5.9% in March    #7 history of elevated alkaline phosphatase most recent labs 7/21/23 alk phos 123 (),liver fraction 61% (18-85%), bone fraction 39% (14-68%),AMA<20,AST 21,ALT 37,bili 0.5,    #8  History endometrial cancer status post JACKLYN/BSO, followed by [facility] gynecology    #9 left knee burning sensation when kneeling down, no abnormalities on exam    #10 right base of thumb some tenderness at times when gripping objects    #11 occasional popping sensation in her chest with family history of arrhythmia    #12 postmenopausal bone loss      Plan  #1 old records from [facility] regarding thyroid ultrasound    #2 continue estradiol patch and testosterone cream per  endocrinology    #3 due for bone density test at [facility] after January 6, order submitted and faxed     #4 due colon cancer screening [facility] referral placed as she is already scheduled for February    #5 old records [facility] optometry dr.troy jones    #6 old records skin cancer dermatology    #7 recommend she get vaccines influenza, shingles, RSV, COVID, Prevnar 20, she declines    #8 Labs    #9 EKG sinus rhythm, recommend she consider Apple Watch or NovaShunt when she notices the popping sensation    #10 continue to work on a good nutrition and exercise program    #11 follow-up 1 year

## 2023-12-22 NOTE — TELEPHONE ENCOUNTER
Please call for old records  (1) [facility] for recent ultrasound thyroid records    (2)  [facility]  for old records    (3) skin cancer dermatology [place]

## 2024-01-10 ENCOUNTER — TELEPHONE (OUTPATIENT)
Dept: MEDICAL GROUP | Facility: MEDICAL CENTER | Age: 69
End: 2024-01-10
Payer: COMMERCIAL

## 2024-01-10 NOTE — TELEPHONE ENCOUNTER
Please notify the patient that her bone density done at [facility] shows normal bone strength of her back, and decreased bone strength of her hip but she does not have osteoporosis.    The bone strength of her hip is similar to her bone density test done 2 years ago, no medications are necessary.  Have her continue to work on a good exercise program, continue taking vitamin D supplements and calcium 1000 mg daily.  We can repeat her bone density test in 2 years.

## 2024-01-11 ENCOUNTER — TELEPHONE (OUTPATIENT)
Dept: MEDICAL GROUP | Facility: MEDICAL CENTER | Age: 69
End: 2024-01-11
Payer: COMMERCIAL

## 2024-01-11 NOTE — TELEPHONE ENCOUNTER
----- Message from Feliciano Olson M.D. sent at 1/10/2024  3:57 AM PST -----  Please notify the patient that her bone density done at [facility] shows normal bone strength of her back, and decreased bone strength of her hip but she does not have osteoporosis.    The bone strength of her hip is similar to her bone density test done 2 years ago, no medications are necessary.  Have her continue to work on a good exercise program, continue taking vitamin D supplements and calcium 1000 mg daily.  We can repeat her bone density test in 2 years.

## 2024-01-17 ENCOUNTER — APPOINTMENT (RX ONLY)
Dept: URBAN - METROPOLITAN AREA CLINIC 35 | Facility: CLINIC | Age: 69
Setting detail: DERMATOLOGY
End: 2024-01-17

## 2024-01-17 DIAGNOSIS — L50.1 IDIOPATHIC URTICARIA: ICD-10-CM

## 2024-01-17 DIAGNOSIS — L50.3 DERMATOGRAPHIC URTICARIA: ICD-10-CM

## 2024-01-17 DIAGNOSIS — L30.9 DERMATITIS, UNSPECIFIED: ICD-10-CM

## 2024-01-17 DIAGNOSIS — Z71.89 OTHER SPECIFIED COUNSELING: ICD-10-CM

## 2024-01-17 PROCEDURE — ? PRESCRIPTION MEDICATION MANAGEMENT

## 2024-01-17 PROCEDURE — ? SUNSCREEN RECOMMENDATIONS

## 2024-01-17 PROCEDURE — 99214 OFFICE O/P EST MOD 30 MIN: CPT

## 2024-01-17 PROCEDURE — ? TREATMENT REGIMEN

## 2024-01-17 PROCEDURE — ? COUNSELING

## 2024-01-17 ASSESSMENT — LOCATION SIMPLE DESCRIPTION DERM
LOCATION SIMPLE: RIGHT UPPER BACK
LOCATION SIMPLE: LEFT UPPER BACK

## 2024-01-17 ASSESSMENT — LOCATION ZONE DERM: LOCATION ZONE: TRUNK

## 2024-01-17 ASSESSMENT — LOCATION DETAILED DESCRIPTION DERM
LOCATION DETAILED: LEFT INFERIOR UPPER BACK
LOCATION DETAILED: LEFT MID-UPPER BACK
LOCATION DETAILED: RIGHT MID-UPPER BACK

## 2024-01-17 NOTE — PROCEDURE: PRESCRIPTION MEDICATION MANAGEMENT
Continue Regimen: Triamcinolone
Render In Strict Bullet Format?: Yes
Detail Level: Zone
Plan: Pt re-educated on Triamcinolone use and side effects

## 2024-01-17 NOTE — PROCEDURE: TREATMENT REGIMEN
Continue Regimen: - triamcinolone acetonide 0.1 % topical cream. Apply on affected skin BID as needed for 2 weeks alternating with 1 week off., repeat as needed. Never on face, underarms or groin. Taper to once a day, then every other day\\n\\n- Zyrtec 1-2 pills AM & PM
Detail Level: Zone

## 2024-01-31 ENCOUNTER — TELEPHONE (OUTPATIENT)
Dept: MEDICAL GROUP | Facility: MEDICAL CENTER | Age: 69
End: 2024-01-31

## 2024-01-31 DIAGNOSIS — Z78.9 PATIENT HAS ACTIVE POWER OF ATTORNEY FOR HEALTH CARE: ICD-10-CM

## 2024-02-01 ENCOUNTER — APPOINTMENT (OUTPATIENT)
Dept: MEDICAL GROUP | Facility: MEDICAL CENTER | Age: 69
End: 2024-02-01
Payer: COMMERCIAL

## 2024-02-06 PROBLEM — D12.6 ADENOMA OF COLON: Status: ACTIVE | Noted: 2024-02-06

## 2024-02-15 RX ORDER — RUXOLITINIB 15 MG/G
1 CREAM TOPICAL TWICE DAILY
Qty: 60 | Refills: 6 | Status: ERX

## 2024-07-15 ENCOUNTER — OFFICE VISIT (OUTPATIENT)
Dept: MEDICAL GROUP | Facility: MEDICAL CENTER | Age: 69
End: 2024-07-15
Payer: COMMERCIAL

## 2024-07-15 ENCOUNTER — TELEPHONE (OUTPATIENT)
Dept: MEDICAL GROUP | Facility: MEDICAL CENTER | Age: 69
End: 2024-07-15

## 2024-07-15 VITALS
TEMPERATURE: 97.3 F | WEIGHT: 163 LBS | RESPIRATION RATE: 16 BRPM | HEIGHT: 65 IN | BODY MASS INDEX: 27.16 KG/M2 | DIASTOLIC BLOOD PRESSURE: 58 MMHG | HEART RATE: 104 BPM | OXYGEN SATURATION: 97 % | SYSTOLIC BLOOD PRESSURE: 104 MMHG

## 2024-07-15 DIAGNOSIS — E78.5 DYSLIPIDEMIA: Chronic | ICD-10-CM

## 2024-07-15 DIAGNOSIS — L50.9 URTICARIA: ICD-10-CM

## 2024-07-15 DIAGNOSIS — R73.09 IMPAIRED GLUCOSE METABOLISM: ICD-10-CM

## 2024-07-15 DIAGNOSIS — Z00.00 PREVENTATIVE HEALTH CARE: Chronic | ICD-10-CM

## 2024-07-15 PROCEDURE — 3074F SYST BP LT 130 MM HG: CPT | Performed by: INTERNAL MEDICINE

## 2024-07-15 PROCEDURE — 99214 OFFICE O/P EST MOD 30 MIN: CPT | Performed by: INTERNAL MEDICINE

## 2024-07-15 PROCEDURE — 3078F DIAST BP <80 MM HG: CPT | Performed by: INTERNAL MEDICINE

## 2024-07-15 RX ORDER — ESTRADIOL 0.04 MG/D
1 PATCH TRANSDERMAL
Qty: 4 PATCH | Refills: 0
Start: 2024-07-15

## 2024-07-15 RX ORDER — TESTOSTERONE MICRONIZED 100 %
POWDER (GRAM) MISCELLANEOUS
COMMUNITY

## 2024-07-15 ASSESSMENT — PATIENT HEALTH QUESTIONNAIRE - PHQ9: CLINICAL INTERPRETATION OF PHQ2 SCORE: 0

## 2024-07-15 ASSESSMENT — FIBROSIS 4 INDEX: FIB4 SCORE: 0.95

## 2024-08-18 LAB
ALBUMIN SERPL-MCNC: 4.2 G/DL (ref 3.9–4.9)
ALP SERPL-CCNC: 96 IU/L (ref 44–121)
ALT SERPL-CCNC: 44 IU/L (ref 0–32)
AST SERPL-CCNC: 34 IU/L (ref 0–40)
BASOPHILS # BLD AUTO: 0.1 X10E3/UL (ref 0–0.2)
BASOPHILS NFR BLD AUTO: 1 %
BILIRUB SERPL-MCNC: 0.4 MG/DL (ref 0–1.2)
BUN SERPL-MCNC: 14 MG/DL (ref 8–27)
BUN/CREAT SERPL: 23 (ref 12–28)
CALCIUM SERPL-MCNC: 9.1 MG/DL (ref 8.7–10.3)
CHLORIDE SERPL-SCNC: 100 MMOL/L (ref 96–106)
CHOLEST SERPL-MCNC: 202 MG/DL (ref 100–199)
CO2 SERPL-SCNC: 24 MMOL/L (ref 20–29)
CREAT SERPL-MCNC: 0.61 MG/DL (ref 0.57–1)
EGFRCR SERPLBLD CKD-EPI 2021: 97 ML/MIN/1.73
EOSINOPHIL # BLD AUTO: 0.1 X10E3/UL (ref 0–0.4)
EOSINOPHIL NFR BLD AUTO: 2 %
ERYTHROCYTE [DISTWIDTH] IN BLOOD BY AUTOMATED COUNT: 13 % (ref 11.7–15.4)
ERYTHROCYTE [SEDIMENTATION RATE] IN BLOOD BY WESTERGREN METHOD: 13 MM/HR (ref 0–40)
GLOBULIN SER CALC-MCNC: 2.1 G/DL (ref 1.5–4.5)
GLUCOSE SERPL-MCNC: 93 MG/DL (ref 70–99)
HBA1C MFR BLD: 5.6 % (ref 4.8–5.6)
HCT VFR BLD AUTO: 44.4 % (ref 34–46.6)
HDLC SERPL-MCNC: 58 MG/DL
HGB BLD-MCNC: 14.9 G/DL (ref 11.1–15.9)
IGE SERPL-ACNC: 3 IU/ML (ref 6–495)
IMM GRANULOCYTES # BLD AUTO: 0 X10E3/UL (ref 0–0.1)
IMM GRANULOCYTES NFR BLD AUTO: 0 %
IMMATURE CELLS  115398: NORMAL
LDL CALC COMMENT:: ABNORMAL
LDLC SERPL CALC-MCNC: 122 MG/DL (ref 0–99)
LYMPHOCYTES # BLD AUTO: 2 X10E3/UL (ref 0.7–3.1)
LYMPHOCYTES NFR BLD AUTO: 28 %
MCH RBC QN AUTO: 30.9 PG (ref 26.6–33)
MCHC RBC AUTO-ENTMCNC: 33.6 G/DL (ref 31.5–35.7)
MCV RBC AUTO: 92 FL (ref 79–97)
MONOCYTES # BLD AUTO: 0.6 X10E3/UL (ref 0.1–0.9)
MONOCYTES NFR BLD AUTO: 8 %
MORPHOLOGY BLD-IMP: NORMAL
NEUTROPHILS # BLD AUTO: 4.3 X10E3/UL (ref 1.4–7)
NEUTROPHILS NFR BLD AUTO: 61 %
NRBC BLD AUTO-RTO: NORMAL %
PLATELET # BLD AUTO: 208 X10E3/UL (ref 150–450)
POTASSIUM SERPL-SCNC: 4.2 MMOL/L (ref 3.5–5.2)
PROT SERPL-MCNC: 6.3 G/DL (ref 6–8.5)
RBC # BLD AUTO: 4.82 X10E6/UL (ref 3.77–5.28)
SODIUM SERPL-SCNC: 137 MMOL/L (ref 134–144)
TRIGL SERPL-MCNC: 125 MG/DL (ref 0–149)
TRYPTASE SERPL-MCNC: 7.8 UG/L (ref 2.2–13.2)
VLDLC SERPL CALC-MCNC: 22 MG/DL (ref 5–40)
WBC # BLD AUTO: 7 X10E3/UL (ref 3.4–10.8)

## 2024-08-19 ENCOUNTER — TELEPHONE (OUTPATIENT)
Dept: MEDICAL GROUP | Facility: MEDICAL CENTER | Age: 69
End: 2024-08-19
Payer: COMMERCIAL

## 2024-08-20 NOTE — TELEPHONE ENCOUNTER
Notified with labs, minimal elevation ALT stable, otherwise normal liver enzymes.  Cholesterol panel stable, 10-year risk less than 6% no need for statin therapy, A1c improved.  Tryptase, IgE, CBC unremarkable, referral made to allergy immunology for urticaria she was not contacted yet to schedule that appointment but she has been out of town, actually out of the country, so she may not have received the referral appointment message.  Provided her the number of 's from allergy immunology to call to schedule that appointment for the persistent hives.  On Zyrtec once a day no benefit, no drowsiness, try increasing Zyrtec to twice a day to see if that helps but monitor for drowsiness or sedation.

## 2024-09-11 ENCOUNTER — APPOINTMENT (RX ONLY)
Dept: URBAN - METROPOLITAN AREA CLINIC 35 | Facility: CLINIC | Age: 69
Setting detail: DERMATOLOGY
End: 2024-09-11

## 2024-09-11 DIAGNOSIS — Z71.89 OTHER SPECIFIED COUNSELING: ICD-10-CM

## 2024-09-11 DIAGNOSIS — D22 MELANOCYTIC NEVI: ICD-10-CM

## 2024-09-11 DIAGNOSIS — D18.0 HEMANGIOMA: ICD-10-CM

## 2024-09-11 DIAGNOSIS — L82.1 OTHER SEBORRHEIC KERATOSIS: ICD-10-CM

## 2024-09-11 DIAGNOSIS — L44.8 OTHER SPECIFIED PAPULOSQUAMOUS DISORDERS: ICD-10-CM

## 2024-09-11 DIAGNOSIS — L50.1 IDIOPATHIC URTICARIA: ICD-10-CM

## 2024-09-11 DIAGNOSIS — L30.9 DERMATITIS, UNSPECIFIED: ICD-10-CM

## 2024-09-11 DIAGNOSIS — L81.4 OTHER MELANIN HYPERPIGMENTATION: ICD-10-CM

## 2024-09-11 PROBLEM — D22.39 MELANOCYTIC NEVI OF OTHER PARTS OF FACE: Status: ACTIVE | Noted: 2024-09-11

## 2024-09-11 PROBLEM — D22.5 MELANOCYTIC NEVI OF TRUNK: Status: ACTIVE | Noted: 2024-09-11

## 2024-09-11 PROBLEM — D18.01 HEMANGIOMA OF SKIN AND SUBCUTANEOUS TISSUE: Status: ACTIVE | Noted: 2024-09-11

## 2024-09-11 PROCEDURE — ? COUNSELING

## 2024-09-11 PROCEDURE — ? SUNSCREEN RECOMMENDATIONS

## 2024-09-11 PROCEDURE — 99213 OFFICE O/P EST LOW 20 MIN: CPT

## 2024-09-11 PROCEDURE — ? MEDICATION COUNSELING

## 2024-09-11 PROCEDURE — ? OBSERVATION AND MEASURE

## 2024-09-11 PROCEDURE — ? TREATMENT REGIMEN

## 2024-09-11 ASSESSMENT — LOCATION DETAILED DESCRIPTION DERM
LOCATION DETAILED: RIGHT MID-UPPER BACK
LOCATION DETAILED: EPIGASTRIC SKIN
LOCATION DETAILED: RIGHT PROXIMAL POSTERIOR UPPER ARM
LOCATION DETAILED: RIGHT ANTERIOR PROXIMAL UPPER ARM
LOCATION DETAILED: LEFT INFERIOR UPPER BACK
LOCATION DETAILED: LEFT DISTAL POSTERIOR UPPER ARM
LOCATION DETAILED: RIGHT SUPERIOR MEDIAL UPPER BACK
LOCATION DETAILED: RIGHT MEDIAL MALAR CHEEK
LOCATION DETAILED: RIGHT SUPERIOR UPPER BACK
LOCATION DETAILED: RIGHT RIB CAGE

## 2024-09-11 ASSESSMENT — LOCATION SIMPLE DESCRIPTION DERM
LOCATION SIMPLE: ABDOMEN
LOCATION SIMPLE: RIGHT POSTERIOR UPPER ARM
LOCATION SIMPLE: RIGHT CHEEK
LOCATION SIMPLE: LEFT UPPER BACK
LOCATION SIMPLE: LEFT POSTERIOR UPPER ARM
LOCATION SIMPLE: RIGHT UPPER ARM
LOCATION SIMPLE: RIGHT UPPER BACK

## 2024-09-11 ASSESSMENT — LOCATION ZONE DERM
LOCATION ZONE: FACE
LOCATION ZONE: TRUNK
LOCATION ZONE: ARM

## 2024-09-11 ASSESSMENT — BSA RASH: BSA RASH: 10

## 2024-09-11 ASSESSMENT — ITCH NUMERIC RATING SCALE: HOW SEVERE IS YOUR ITCHING?: 8

## 2024-09-11 NOTE — PROCEDURE: MEDICATION COUNSELING
Skyrizi Counseling: I discussed with the patient the risks of risankizumab-rzaa including but not limited to immunosuppression, and serious infections.  The patient understands that monitoring is required including a PPD at baseline and must alert us or the primary physician if symptoms of infection or other concerning signs are noted.
Valtrex Pregnancy And Lactation Text: this medication is Pregnancy Category B and is considered safe during pregnancy. This medication is not directly found in breast milk but it's metabolite acyclovir is present.
Adbry Counseling: I discussed with the patient the risks of tralokinumab including but not limited to eye infection and irritation, cold sores, injection site reactions, worsening of asthma, allergic reactions and increased risk of parasitic infection.  Live vaccines should be avoided while taking tralokinumab. The patient understands that monitoring is required and they must alert us or the primary physician if symptoms of infection or other concerning signs are noted.
Topical Ketoconazole Counseling: Patient counseled that this medication may cause skin irritation or allergic reactions.  In the event of skin irritation, the patient was advised to reduce the amount of the drug applied or use it less frequently.   The patient verbalized understanding of the proper use and possible adverse effects of ketoconazole.  All of the patient's questions and concerns were addressed.
Hydroxyzine Counseling: Patient advised that the medication is sedating and not to drive a car after taking this medication.  Patient informed of potential adverse effects including but not limited to dry mouth, urinary retention, and blurry vision.  The patient verbalized understanding of the proper use and possible adverse effects of hydroxyzine.  All of the patient's questions and concerns were addressed.
Cibinqo Counseling: I discussed with the patient the risks of Cibinqo therapy including but not limited to common cold, nausea, headache, cold sores, increased blood CPK levels, dizziness, UTIs, fatigue, acne, and vomitting. Live vaccines should be avoided.  This medication has been linked to serious infections; higher rate of mortality; malignancy and lymphoproliferative disorders; major adverse cardiovascular events; thrombosis; thrombocytopenia and lymphopenia; lipid elevations; and retinal detachment.
Libtayo Counseling- I discussed with the patient the risks of Libtayo including but not limited to nausea, vomiting, diarrhea, and bone or muscle pain.  The patient verbalized understanding of the proper use and possible adverse effects of Libtayo.  All of the patient's questions and concerns were addressed.
Olanzapine Counseling- I discussed with the patient the common side effects of olanzapine including but are not limited to: lack of energy, dry mouth, increased appetite, sleepiness, tremor, constipation, dizziness, changes in behavior, or restlessness.  Explained that teenagers are more likely to experience headaches, abdominal pain, pain in the arms or legs, tiredness, and sleepiness.  Serious side effects include but are not limited: increased risk of death in elderly patients who are confused, have memory loss, or dementia-related psychosis; hyperglycemia; increased cholesterol and triglycerides; and weight gain.
Taltz Counseling: I discussed with the patient the risks of ixekizumab including but not limited to immunosuppression, serious infections, worsening of inflammatory bowel disease and drug reactions.  The patient understands that monitoring is required including a PPD at baseline and must alert us or the primary physician if symptoms of infection or other concerning signs are noted.
Bactrim Pregnancy And Lactation Text: This medication is Pregnancy Category D and is known to cause fetal risk.  It is also excreted in breast milk.
Ilumya Pregnancy And Lactation Text: The risk during pregnancy and breastfeeding is uncertain with this medication.
Zyclara Pregnancy And Lactation Text: This medication is Pregnancy Category C. It is unknown if this medication is excreted in breast milk.
Klisyri Counseling:  I discussed with the patient the risks of Klisyri including but not limited to erythema, scaling, itching, weeping, crusting, and pain.
Propranolol Pregnancy And Lactation Text: This medication is Pregnancy Category C and it isn't known if it is safe during pregnancy. It is excreted in breast milk.
Solaraze Pregnancy And Lactation Text: This medication is Pregnancy Category B and is considered safe. There is some data to suggest avoiding during the third trimester. It is unknown if this medication is excreted in breast milk.
Drysol Pregnancy And Lactation Text: This medication is considered safe during pregnancy and breast feeding.
Finasteride Pregnancy And Lactation Text: This medication is absolutely contraindicated during pregnancy. It is unknown if it is excreted in breast milk.
Clofazimine Counseling:  I discussed with the patient the risks of clofazimine including but not limited to skin and eye pigmentation, liver damage, nausea/vomiting, gastrointestinal bleeding and allergy.
Wartpeel Counseling:  I discussed with the patient the risks of Wartpeel including but not limited to erythema, scaling, itching, weeping, crusting, and pain.
Cyclophosphamide Pregnancy And Lactation Text: This medication is Pregnancy Category D and it isn't considered safe during pregnancy. This medication is excreted in breast milk.
Dupixent Counseling: I discussed with the patient the risks of dupilumab including but not limited to eye infection and irritation, cold sores, injection site reactions, worsening of asthma, allergic reactions and increased risk of parasitic infection.  Live vaccines should be avoided while taking dupilumab. Dupilumab will also interact with certain medications such as warfarin and cyclosporine. The patient understands that monitoring is required and they must alert us or the primary physician if symptoms of infection or other concerning signs are noted.
Hydroxychloroquine Counseling:  I discussed with the patient that a baseline ophthalmologic exam is needed at the start of therapy and every year thereafter while on therapy. A CBC may also be warranted for monitoring.  The side effects of this medication were discussed with the patient, including but not limited to agranulocytosis, aplastic anemia, seizures, rashes, retinopathy, and liver toxicity. Patient instructed to call the office should any adverse effect occur.  The patient verbalized understanding of the proper use and possible adverse effects of Plaquenil.  All the patient's questions and concerns were addressed.
Sarecycline Pregnancy And Lactation Text: This medication is Pregnancy Category D and not consider safe during pregnancy. It is also excreted in breast milk.
Metronidazole Counseling:  I discussed with the patient the risks of metronidazole including but not limited to seizures, nausea/vomiting, a metallic taste in the mouth, nausea/vomiting and severe allergy.
Use Enhanced Medication Counseling?: No
Itraconazole Pregnancy And Lactation Text: This medication is Pregnancy Category C and it isn't know if it is safe during pregnancy. It is also excreted in breast milk.
Isotretinoin Pregnancy And Lactation Text: This medication is Pregnancy Category X and is considered extremely dangerous during pregnancy. It is unknown if it is excreted in breast milk.
Sotyktu Pregnancy And Lactation Text: There is insufficient data to evaluate whether or not Sotyktu is safe to use during pregnancy.? ?It is not known if Sotyktu passes into breast milk and whether or not it is safe to use when breastfeeding.??
Picato Counseling:  I discussed with the patient the risks of Picato including but not limited to erythema, scaling, itching, weeping, crusting, and pain.
Benzoyl Peroxide Pregnancy And Lactation Text: This medication is Pregnancy Category C. It is unknown if benzoyl peroxide is excreted in breast milk.
Olanzapine Pregnancy And Lactation Text: This medication is pregnancy category C.   There are no adequate and well controlled trials with olanzapine in pregnant females.  Olanzapine should be used during pregnancy only if the potential benefit justifies the potential risk to the fetus.   In a study in lactating healthy women, olanzapine was excreted in breast milk.  It is recommended that women taking olanzapine should not breast feed.
Doxepin Pregnancy And Lactation Text: This medication is Pregnancy Category C and it isn't known if it is safe during pregnancy. It is also excreted in breast milk and breast feeding isn't recommended.
Cibinqo Pregnancy And Lactation Text: It is unknown if this medication will adversely affect pregnancy or breast feeding.  You should not take this medication if you are currently pregnant or planning a pregnancy or while breastfeeding.
Zyclara Counseling:  I discussed with the patient the risks of imiquimod including but not limited to erythema, scaling, itching, weeping, crusting, and pain.  Patient understands that the inflammatory response to imiquimod is variable from person to person and was educated regarded proper titration schedule.  If flu-like symptoms develop, patient knows to discontinue the medication and contact us.
Topical Clindamycin Pregnancy And Lactation Text: This medication is Pregnancy Category B and is considered safe during pregnancy. It is unknown if it is excreted in breast milk.
Cephalexin Counseling: I counseled the patient regarding use of cephalexin as an antibiotic for prophylactic and/or therapeutic purposes. Cephalexin (commonly prescribed under brand name Keflex) is a cephalosporin antibiotic which is active against numerous classes of bacteria, including most skin bacteria. Side effects may include nausea, diarrhea, gastrointestinal upset, rash, hives, yeast infections, and in rare cases, hepatitis, kidney disease, seizures, fever, confusion, neurologic symptoms, and others. Patients with severe allergies to penicillin medications are cautioned that there is about a 10% incidence of cross-reactivity with cephalosporins. When possible, patients with penicillin allergies should use alternatives to cephalosporins for antibiotic therapy.
Erivedge Pregnancy And Lactation Text: This medication is Pregnancy Category X and is absolutely contraindicated during pregnancy. It is unknown if it is excreted in breast milk.
Ilumya Counseling: I discussed with the patient the risks of tildrakizumab including but not limited to immunosuppression, malignancy, posterior leukoencephalopathy syndrome, and serious infections.  The patient understands that monitoring is required including a PPD at baseline and must alert us or the primary physician if symptoms of infection or other concerning signs are noted.
Solaraze Counseling:  I discussed with the patient the risks of Solaraze including but not limited to erythema, scaling, itching, weeping, crusting, and pain.
SSKI Counseling:  I discussed with the patient the risks of SSKI including but not limited to thyroid abnormalities, metallic taste, GI upset, fever, headache, acne, arthralgias, paraesthesias, lymphadenopathy, easy bleeding, arrhythmias, and allergic reaction.
Birth Control Pills Counseling: Birth Control Pill Counseling: I discussed with the patient the potential side effects of OCPs including but not limited to increased risk of stroke, heart attack, thrombophlebitis, deep venous thrombosis, hepatic adenomas, breast changes, GI upset, headaches, and depression.  The patient verbalized understanding of the proper use and possible adverse effects of OCPs. All of the patient's questions and concerns were addressed.
Cosentyx Pregnancy And Lactation Text: This medication is Pregnancy Category B and is considered safe during pregnancy. It is unknown if this medication is excreted in breast milk.
Topical Sulfur Applications Pregnancy And Lactation Text: This medication is Pregnancy Category C and has an unknown safety profile during pregnancy. It is unknown if this topical medication is excreted in breast milk.
Hydroxychloroquine Pregnancy And Lactation Text: This medication has been shown to cause fetal harm but it isn't assigned a Pregnancy Risk Category. There are small amounts excreted in breast milk.
Odomzo Counseling- I discussed with the patient the risks of Odomzo including but not limited to nausea, vomiting, diarrhea, constipation, weight loss, changes in the sense of taste, decreased appetite, muscle spasms, and hair loss.  The patient verbalized understanding of the proper use and possible adverse effects of Odomzo.  All of the patient's questions and concerns were addressed.
Drysol Counseling:  I discussed with the patient the risks of drysol/aluminum chloride including but not limited to skin rash, itching, irritation, burning.
Metronidazole Pregnancy And Lactation Text: This medication is Pregnancy Category B and considered safe during pregnancy.  It is also excreted in breast milk.
Xeljanz Counseling: I discussed with the patient the risks of Xeljanz therapy including increased risk of infection, liver issues, headache, diarrhea, or cold symptoms. Live vaccines should be avoided. They were instructed to call if they have any problems.
Cyclosporine Counseling:  I discussed with the patient the risks of cyclosporine including but not limited to hypertension, gingival hyperplasia,myelosuppression, immunosuppression, liver damage, kidney damage, neurotoxicity, lymphoma, and serious infections. The patient understands that monitoring is required including baseline blood pressure, CBC, CMP, lipid panel and uric acid, and then 1-2 times monthly CMP and blood pressure.
Opzelura Pregnancy And Lactation Text: There is insufficient data to evaluate drug-associated risk for major birth defects, miscarriage, or other adverse maternal or fetal outcomes.  There is a pregnancy registry that monitors pregnancy outcomes in pregnant persons exposed to the medication during pregnancy.  It is unknown if this medication is excreted in breast milk.  Do not breastfeed during treatment and for about 4 weeks after the last dose.
Sarecycline Counseling: Patient advised regarding possible photosensitivity and discoloration of the teeth, skin, lips, tongue and gums.  Patient instructed to avoid sunlight, if possible.  When exposed to sunlight, patients should wear protective clothing, sunglasses, and sunscreen.  The patient was instructed to call the office immediately if the following severe adverse effects occur:  hearing changes, easy bruising/bleeding, severe headache, or vision changes.  The patient verbalized understanding of the proper use and possible adverse effects of sarecycline.  All of the patient's questions and concerns were addressed.
Itraconazole Counseling:  I discussed with the patient the risks of itraconazole including but not limited to liver damage, nausea/vomiting, neuropathy, and severe allergy.  The patient understands that this medication is best absorbed when taken with acidic beverages such as non-diet cola or ginger ale.  The patient understands that monitoring is required including baseline LFTs and repeat LFTs at intervals.  The patient understands that they are to contact us or the primary physician if concerning signs are noted.
Carac Counseling:  I discussed with the patient the risks of Carac including but not limited to erythema, scaling, itching, weeping, crusting, and pain.
Simponi Counseling:  I discussed with the patient the risks of golimumab including but not limited to myelosuppression, immunosuppression, autoimmune hepatitis, demyelinating diseases, lymphoma, and serious infections.  The patient understands that monitoring is required including a PPD at baseline and must alert us or the primary physician if symptoms of infection or other concerning signs are noted.
Doxepin Counseling:  Patient advised that the medication is sedating and not to drive a car after taking this medication. Patient informed of potential adverse effects including but not limited to dry mouth, urinary retention, and blurry vision.  The patient verbalized understanding of the proper use and possible adverse effects of doxepin.  All of the patient's questions and concerns were addressed.
High Dose Vitamin A Counseling: Side effects reviewed, pt to contact office should one occur.
Topical Clindamycin Counseling: Patient counseled that this medication may cause skin irritation or allergic reactions.  In the event of skin irritation, the patient was advised to reduce the amount of the drug applied or use it less frequently.   The patient verbalized understanding of the proper use and possible adverse effects of clindamycin.  All of the patient's questions and concerns were addressed.
Oral Minoxidil Counseling- I discussed with the patient the risks of oral minoxidil including but not limited to shortness of breath, swelling of the feet or ankles, dizziness, lightheadedness, unwanted hair growth and allergic reaction.  The patient verbalized understanding of the proper use and possible adverse effects of oral minoxidil.  All of the patient's questions and concerns were addressed.
Gabapentin Pregnancy And Lactation Text: This medication is Pregnancy Category C and isn't considered safe during pregnancy. It is excreted in breast milk.
Litfulo Counseling: I discussed with the patient the risks of Litfulo therapy including but not limited to upper respiratory tract infections, shingles, cold sores, and nausea. Live vaccines should be avoided.  This medication has been linked to serious infections; higher rate of mortality; malignancy and lymphoproliferative disorders; major adverse cardiovascular events; thrombosis; gastrointestinal perforations; neutropenia; lymphopenia; anemia; liver enzyme elevations; and lipid elevations.
Zoryve Pregnancy And Lactation Text: It is unknown if this medication can cause problems during pregnancy and breastfeeding.
Imiquimod Counseling:  I discussed with the patient the risks of imiquimod including but not limited to erythema, scaling, itching, weeping, crusting, and pain.  Patient understands that the inflammatory response to imiquimod is variable from person to person and was educated regarded proper titration schedule.  If flu-like symptoms develop, patient knows to discontinue the medication and contact us.
Cephalexin Pregnancy And Lactation Text: This medication is Pregnancy Category B and considered safe during pregnancy.  It is also excreted in breast milk but can be used safely for shorter doses.
Erivedge Counseling- I discussed with the patient the risks of Erivedge including but not limited to nausea, vomiting, diarrhea, constipation, weight loss, changes in the sense of taste, decreased appetite, muscle spasms, and hair loss.  The patient verbalized understanding of the proper use and possible adverse effects of Erivedge.  All of the patient's questions and concerns were addressed.
Sski Pregnancy And Lactation Text: This medication is Pregnancy Category D and isn't considered safe during pregnancy. It is excreted in breast milk.
Spironolactone Pregnancy And Lactation Text: This medication can cause feminization of the male fetus and should be avoided during pregnancy. The active metabolite is also found in breast milk.
Rhofade Pregnancy And Lactation Text: This medication has not been assigned a Pregnancy Risk Category. It is unknown if the medication is excreted in breast milk.
5-Fu Pregnancy And Lactation Text: This medication is Pregnancy Category X and contraindicated in pregnancy and in women who may become pregnant. It is unknown if this medication is excreted in breast milk.
Birth Control Pills Pregnancy And Lactation Text: This medication should be avoided if pregnant and for the first 30 days post-partum.
Cosentyx Counseling:  I discussed with the patient the risks of Cosentyx including but not limited to worsening of Crohn's disease, immunosuppression, allergic reactions and infections.  The patient understands that monitoring is required including a PPD at baseline and must alert us or the primary physician if symptoms of infection or other concerning signs are noted.
Topical Sulfur Applications Counseling: Topical Sulfur Counseling: Patient counseled that this medication may cause skin irritation or allergic reactions.  In the event of skin irritation, the patient was advised to reduce the amount of the drug applied or use it less frequently.   The patient verbalized understanding of the proper use and possible adverse effects of topical sulfur application.  All of the patient's questions and concerns were addressed.
Rifampin Pregnancy And Lactation Text: This medication is Pregnancy Category C and it isn't know if it is safe during pregnancy. It is also excreted in breast milk and should not be used if you are breast feeding.
Minocycline Counseling: Patient advised regarding possible photosensitivity and discoloration of the teeth, skin, lips, tongue and gums.  Patient instructed to avoid sunlight, if possible.  When exposed to sunlight, patients should wear protective clothing, sunglasses, and sunscreen.  The patient was instructed to call the office immediately if the following severe adverse effects occur:  hearing changes, easy bruising/bleeding, severe headache, or vision changes.  The patient verbalized understanding of the proper use and possible adverse effects of minocycline.  All of the patient's questions and concerns were addressed.
Low Dose Naltrexone Counseling- I discussed with the patient the potential risks and side effects of low dose naltrexone including but not limited to: more vivid dreams, headaches, nausea, vomiting, abdominal pain, fatigue, dizziness, and anxiety.
Cyclosporine Pregnancy And Lactation Text: This medication is Pregnancy Category C and it isn't know if it is safe during pregnancy. This medication is excreted in breast milk.
High Dose Vitamin A Pregnancy And Lactation Text: High dose vitamin A therapy is contraindicated during pregnancy and breast feeding.
Detail Level: Zone
Xelmiguelz Pregnancy And Lactation Text: This medication is Pregnancy Category D and is not considered safe during pregnancy.  The risk during breast feeding is also uncertain.
Griseofulvin Pregnancy And Lactation Text: This medication is Pregnancy Category X and is known to cause serious birth defects. It is unknown if this medication is excreted in breast milk but breast feeding should be avoided.
Opzelura Counseling:  I discussed with the patient the risks of Opzelura including but not limited to nasopharngitis, bronchitis, ear infection, eosinophila, hives, diarrhea, folliculitis, tonsillitis, and rhinorrhea.  Taken orally, this medication has been linked to serious infections; higher rate of mortality; malignancy and lymphoproliferative disorders; major adverse cardiovascular events; thrombosis; thrombocytopenia, anemia, and neutropenia; and lipid elevations.
Oral Minoxidil Pregnancy And Lactation Text: This medication should only be used when clearly needed if you are pregnant, attempting to become pregnant or breast feeding.
Gabapentin Counseling: I discussed with the patient the risks of gabapentin including but not limited to dizziness, somnolence, fatigue and ataxia.
Zoryve Counseling:  I discussed with the patient that Zoryve is not for use in the eyes, mouth or vagina. The most commonly reported side effects include diarrhea, headache, insomnia, application site pain, upper respiratory tract infections, and urinary tract infections.  All of the patient's questions and concerns were addressed.
Tazorac Pregnancy And Lactation Text: This medication is not safe during pregnancy. It is unknown if this medication is excreted in breast milk.
Ivermectin Pregnancy And Lactation Text: This medication is Pregnancy Category C and it isn't known if it is safe during pregnancy. It is also excreted in breast milk.
Cimetidine Pregnancy And Lactation Text: This medication is Pregnancy Category B and is considered safe during pregnancy. It is also excreted in breast milk and breast feeding isn't recommended.
Litfulo Pregnancy And Lactation Text: Based on animal studies, Lifulo may cause embryo-fetal harm when administered to pregnant women.  The medication should not be used in pregnancy.  Breastfeeding is not recommended during treatment.
Hydroquinone Pregnancy And Lactation Text: This medication has not been assigned a Pregnancy Risk Category but animal studies failed to show danger with the topical medication. It is unknown if the medication is excreted in breast milk.
Hyrimoz Counseling:  I discussed with the patient the risks of adalimumab including but not limited to myelosuppression, immunosuppression, autoimmune hepatitis, demyelinating diseases, lymphoma, and serious infections.  The patient understands that monitoring is required including a PPD at baseline and must alert us or the primary physician if symptoms of infection or other concerning signs are noted.
Rhofade Counseling: Rhofade is a topical medication which can decrease superficial blood flow where applied. Side effects are uncommon and include stinging, redness and allergic reactions.
Clindamycin Counseling: I counseled the patient regarding use of clindamycin as an antibiotic for prophylactic and/or therapeutic purposes. Clindamycin is active against numerous classes of bacteria, including skin bacteria. Side effects may include nausea, diarrhea, gastrointestinal upset, rash, hives, yeast infections, and in rare cases, colitis.
Thalidomide Counseling: I discussed with the patient the risks of thalidomide including but not limited to birth defects, anxiety, weakness, chest pain, dizziness, cough and severe allergy.
Spironolactone Counseling: Patient advised regarding risks of diarrhea, abdominal pain, hyperkalemia, birth defects (for female patients), liver toxicity and renal toxicity. The patient may need blood work to monitor liver and kidney function and potassium levels while on therapy. The patient verbalized understanding of the proper use and possible adverse effects of spironolactone.  All of the patient's questions and concerns were addressed.
Cimzia Pregnancy And Lactation Text: This medication crosses the placenta but can be considered safe in certain situations. Cimzia may be excreted in breast milk.
Topical Steroids Applications Pregnancy And Lactation Text: Most topical steroids are considered safe to use during pregnancy and lactation.  Any topical steroid applied to the breast or nipple should be washed off before breastfeeding.
5-Fu Counseling: 5-Fluorouracil Counseling:  I discussed with the patient the risks of 5-fluorouracil including but not limited to erythema, scaling, itching, weeping, crusting, and pain.
Low Dose Naltrexone Pregnancy And Lactation Text: Naltrexone is pregnancy category C.  There have been no adequate and well-controlled studies in pregnant women.  It should be used in pregnancy only if the potential benefit justifies the potential risk to the fetus.   Limited data indicates that naltrexone is minimally excreted into breastmilk.
Xolair Pregnancy And Lactation Text: This medication is Pregnancy Category B and is considered safe during pregnancy. This medication is excreted in breast milk.
Rifampin Counseling: I discussed with the patient the risks of rifampin including but not limited to liver damage, kidney damage, red-orange body fluids, nausea/vomiting and severe allergy.
Siliq Counseling:  I discussed with the patient the risks of Siliq including but not limited to new or worsening depression, suicidal thoughts and behavior, immunosuppression, malignancy, posterior leukoencephalopathy syndrome, and serious infections.  The patient understands that monitoring is required including a PPD at baseline and must alert us or the primary physician if symptoms of infection or other concerning signs are noted. There is also a special program designed to monitor depression which is required with Siliq.
Cimetidine Counseling:  I discussed with the patient the risks of Cimetidine including but not limited to gynecomastia, headache, diarrhea, nausea, drowsiness, arrhythmias, pancreatitis, skin rashes, psychosis, bone marrow suppression and kidney toxicity.
Griseofulvin Counseling:  I discussed with the patient the risks of griseofulvin including but not limited to photosensitivity, cytopenia, liver damage, nausea/vomiting and severe allergy.  The patient understands that this medication is best absorbed when taken with a fatty meal (e.g., ice cream or french fries).
Azathioprine Counseling:  I discussed with the patient the risks of azathioprine including but not limited to myelosuppression, immunosuppression, hepatotoxicity, lymphoma, and infections.  The patient understands that monitoring is required including baseline LFTs, Creatinine, possible TPMP genotyping and weekly CBCs for the first month and then every 2 weeks thereafter.  The patient verbalized understanding of the proper use and possible adverse effects of azathioprine.  All of the patient's questions and concerns were addressed.
Methotrexate Counseling:  Patient counseled regarding adverse effects of methotrexate including but not limited to nausea, vomiting, abnormalities in liver function tests. Patients may develop mouth sores, rash, diarrhea, and abnormalities in blood counts. The patient understands that monitoring is required including LFT's and blood counts.  There is a rare possibility of scarring of the liver and lung problems that can occur when taking methotrexate. Persistent nausea, loss of appetite, pale stools, dark urine, cough, and shortness of breath should be reported immediately. Patient advised to discontinue methotrexate treatment at least three months before attempting to become pregnant.  I discussed the need for folate supplements while taking methotrexate.  These supplements can decrease side effects during methotrexate treatment. The patient verbalized understanding of the proper use and possible adverse effects of methotrexate.  All of the patient's questions and concerns were addressed.
Tazorac Counseling:  Patient advised that medication is irritating and drying.  Patient may need to apply sparingly and wash off after an hour before eventually leaving it on overnight.  The patient verbalized understanding of the proper use and possible adverse effects of tazorac.  All of the patient's questions and concerns were addressed.
Cantharidin Pregnancy And Lactation Text: This medication has not been proven safe during pregnancy. It is unknown if this medication is excreted in breast milk.
Otezla Counseling: The side effects of Otezla were discussed with the patient, including but not limited to worsening or new depression, weight loss, diarrhea, nausea, upper respiratory tract infection, and headache. Patient instructed to call the office should any adverse effect occur.  The patient verbalized understanding of the proper use and possible adverse effects of Otezla.  All the patient's questions and concerns were addressed.
Dutasteride Male Counseling: Dustasteride Counseling:  I discussed with the patient the risks of use of dutasteride including but not limited to decreased libido, decreased ejaculate volume, and gynecomastia. Women who can become pregnant should not handle medication.  All of the patient's questions and concerns were addressed.
Dapsone Pregnancy And Lactation Text: This medication is Pregnancy Category C and is not considered safe during pregnancy or breast feeding.
Ivermectin Counseling:  Patient instructed to take medication on an empty stomach with a full glass of water.  Patient informed of potential adverse effects including but not limited to nausea, diarrhea, dizziness, itching, and swelling of the extremities or lymph nodes.  The patient verbalized understanding of the proper use and possible adverse effects of ivermectin.  All of the patient's questions and concerns were addressed.
Olumiant Counseling: I discussed with the patient the risks of Olumiant therapy including but not limited to upper respiratory tract infections, shingles, cold sores, and nausea. Live vaccines should be avoided.  This medication has been linked to serious infections; higher rate of mortality; malignancy and lymphoproliferative disorders; major adverse cardiovascular events; thrombosis; gastrointestinal perforations; neutropenia; lymphopenia; anemia; liver enzyme elevations; and lipid elevations.
Hydroquinone Counseling:  Patient advised that medication may result in skin irritation, lightening (hypopigmentation), dryness, and burning.  In the event of skin irritation, the patient was advised to reduce the amount of the drug applied or use it less frequently.  Rarely, spots that are treated with hydroquinone can become darker (pseudoochronosis).  Should this occur, patient instructed to stop medication and call the office. The patient verbalized understanding of the proper use and possible adverse effects of hydroquinone.  All of the patient's questions and concerns were addressed.
Clindamycin Pregnancy And Lactation Text: This medication can be used in pregnancy if certain situations. Clindamycin is also present in breast milk.
Stelara Counseling:  I discussed with the patient the risks of ustekinumab including but not limited to immunosuppression, malignancy, posterior leukoencephalopathy syndrome, and serious infections.  The patient understands that monitoring is required including a PPD at baseline and must alert us or the primary physician if symptoms of infection or other concerning signs are noted.
Aklief counseling:  Patient advised to apply a pea-sized amount only at bedtime and wait 30 minutes after washing their face before applying.  If too drying, patient may add a non-comedogenic moisturizer.  The most commonly reported side effects including irritation, redness, scaling, dryness, stinging, burning, itching, and increased risk of sunburn.  The patient verbalized understanding of the proper use and possible adverse effects of retinoids.  All of the patient's questions and concerns were addressed.
Acitretin Counseling:  I discussed with the patient the risks of acitretin including but not limited to hair loss, dry lips/skin/eyes, liver damage, hyperlipidemia, depression/suicidal ideation, photosensitivity.  Serious rare side effects can include but are not limited to pancreatitis, pseudotumor cerebri, bony changes, clot formation/stroke/heart attack.  Patient understands that alcohol is contraindicated since it can result in liver toxicity and significantly prolong the elimination of the drug by many years.
Qbrexza Pregnancy And Lactation Text: There is no available data on Qbrexza use in pregnant women.  There is no available data on Qbrexza use in lactation.
Cimzia Counseling:  I discussed with the patient the risks of Cimzia including but not limited to immunosuppression, allergic reactions and infections.  The patient understands that monitoring is required including a PPD at baseline and must alert us or the primary physician if symptoms of infection or other concerning signs are noted.
Topical Steroids Counseling: I discussed with the patient that prolonged use of topical steroids can result in the increased appearance of superficial blood vessels (telangiectasias), lightening (hypopigmentation) and thinning of the skin (atrophy).  Patient understands to avoid using high potency steroids in skin folds, the groin or the face.  The patient verbalized understanding of the proper use and possible adverse effects of topical steroids.  All of the patient's questions and concerns were addressed.
Niacinamide Counseling: I recommended taking niacin or niacinamide, also know as vitamin B3, twice daily. Recent evidence suggests that taking vitamin B3 (500 mg twice daily) can reduce the risk of actinic keratoses and non-melanoma skin cancers. Side effects of vitamin B3 include flushing and headache.
Xolair Counseling:  Patient informed of potential adverse effects including but not limited to fever, muscle aches, rash and allergic reactions.  The patient verbalized understanding of the proper use and possible adverse effects of Xolair.  All of the patient's questions and concerns were addressed.
Cantharidin Counseling:  I discussed with the patient the risks of Cantharidin including but not limited to pain, redness, burning, itching, and blistering.
Rituxan Pregnancy And Lactation Text: This medication is Pregnancy Category C and it isn't know if it is safe during pregnancy. It is unknown if this medication is excreted in breast milk but similar antibodies are known to be excreted.
Arava Counseling:  Patient counseled regarding adverse effects of Arava including but not limited to nausea, vomiting, abnormalities in liver function tests. Patients may develop mouth sores, rash, diarrhea, and abnormalities in blood counts. The patient understands that monitoring is required including LFTs and blood counts.  There is a rare possibility of scarring of the liver and lung problems that can occur when taking methotrexate. Persistent nausea, loss of appetite, pale stools, dark urine, cough, and shortness of breath should be reported immediately. Patient advised to discontinue Arava treatment and consult with a physician prior to attempting conception. The patient will have to undergo a treatment to eliminate Arava from the body prior to conception.
Mirvaso Counseling: Mirvaso is a topical medication which can decrease superficial blood flow where applied. Side effects are uncommon and include stinging, redness and allergic reactions.
Otezla Pregnancy And Lactation Text: This medication is Pregnancy Category C and it isn't known if it is safe during pregnancy. It is unknown if it is excreted in breast milk.
Dutasteride Female Counseling: Dutasteride Counseling:  I discussed with the patient the risks of use of dutasteride including but not limited to decreased libido and sexual dysfunction. Explained the teratogenic nature of the medication and stressed the importance of not getting pregnant during treatment. All of the patient's questions and concerns were addressed.
Dapsone Counseling: I discussed with the patient the risks of dapsone including but not limited to hemolytic anemia, agranulocytosis, rashes, methemoglobinemia, kidney failure, peripheral neuropathy, headaches, GI upset, and liver toxicity.  Patients who start dapsone require monitoring including baseline LFTs and weekly CBCs for the first month, then every month thereafter.  The patient verbalized understanding of the proper use and possible adverse effects of dapsone.  All of the patient's questions and concerns were addressed.
Methotrexate Pregnancy And Lactation Text: This medication is Pregnancy Category X and is known to cause fetal harm. This medication is excreted in breast milk.
Azathioprine Pregnancy And Lactation Text: This medication is Pregnancy Category D and isn't considered safe during pregnancy. It is unknown if this medication is excreted in breast milk.
VTAMA Counseling: I discussed with the patient that VTAMA is not for use in the eyes, mouth or mouth. They should call the office if they develop any signs of allergic reactions to VTAMA. The patient verbalized understanding of the proper use and possible adverse effects of VTAMA.  All of the patient's questions and concerns were addressed.
Humira Counseling:  I discussed with the patient the risks of adalimumab including but not limited to myelosuppression, immunosuppression, autoimmune hepatitis, demyelinating diseases, lymphoma, and serious infections.  The patient understands that monitoring is required including a PPD at baseline and must alert us or the primary physician if symptoms of infection or other concerning signs are noted.
Olumiant Pregnancy And Lactation Text: Based on animal studies, Olumiant may cause embryo-fetal harm when administered to pregnant women.  The medication should not be used in pregnancy.  Breastfeeding is not recommended during treatment.
Doxycycline Counseling:  Patient counseled regarding possible photosensitivity and increased risk for sunburn.  Patient instructed to avoid sunlight, if possible.  When exposed to sunlight, patients should wear protective clothing, sunglasses, and sunscreen.  The patient was instructed to call the office immediately if the following severe adverse effects occur:  hearing changes, easy bruising/bleeding, severe headache, or vision changes.  The patient verbalized understanding of the proper use and possible adverse effects of doxycycline.  All of the patient's questions and concerns were addressed.
Qbrexza Counseling:  I discussed with the patient the risks of Qbrexza including but not limited to headache, mydriasis, blurred vision, dry eyes, nasal dryness, dry mouth, dry throat, dry skin, urinary hesitation, and constipation.  Local skin reactions including erythema, burning, stinging, and itching can also occur.
Spevigo Pregnancy And Lactation Text: The risk during pregnancy and breastfeeding is uncertain with this medication. This medication does cross the placenta. It is unknown if this medication is found in breast milk.
Tranexamic Acid Counseling:  Patient advised of the small risk of bleeding problems with tranexamic acid. They were also instructed to call if they developed any nausea, vomiting or diarrhea. All of the patient's questions and concerns were addressed.
Bimzelx Pregnancy And Lactation Text: This medication crosses the placenta and the safety is uncertain during pregnancy. It is unknown if this medication is present in breast milk.
Aklief Pregnancy And Lactation Text: It is unknown if this medication is safe to use during pregnancy.  It is unknown if this medication is excreted in breast milk.  Breastfeeding women should use the topical cream on the smallest area of the skin for the shortest time needed while breastfeeding.  Do not apply to nipple and areola.
Topical Metronidazole Pregnancy And Lactation Text: This medication is Pregnancy Category B and considered safe during pregnancy.  It is also considered safe to use while breastfeeding.
Acitretin Pregnancy And Lactation Text: This medication is Pregnancy Category X and should not be given to women who are pregnant or may become pregnant in the future. This medication is excreted in breast milk.
Niacinamide Pregnancy And Lactation Text: These medications are considered safe during pregnancy.
Calcipotriene Pregnancy And Lactation Text: The use of this medication during pregnancy or lactation is not recommended as there is insufficient data.
Azithromycin Counseling:  I discussed with the patient the risks of azithromycin including but not limited to GI upset, allergic reaction, drug rash, diarrhea, and yeast infections.
Quinolones Counseling:  I discussed with the patient the risks of fluoroquinolones including but not limited to GI upset, allergic reaction, drug rash, diarrhea, dizziness, photosensitivity, yeast infections, liver function test abnormalities, tendonitis/tendon rupture.
Rituxan Counseling:  I discussed with the patient the risks of Rituxan infusions. Side effects can include infusion reactions, severe drug rashes including mucocutaneous reactions, reactivation of latent hepatitis and other infections and rarely progressive multifocal leukoencephalopathy.  All of the patient's questions and concerns were addressed.
Fluconazole Counseling:  Patient counseled regarding adverse effects of fluconazole including but not limited to headache, diarrhea, nausea, upset stomach, liver function test abnormalities, taste disturbance, and stomach pain.  There is a rare possibility of liver failure that can occur when taking fluconazole.  The patient understands that monitoring of LFTs and kidney function test may be required, especially at baseline. The patient verbalized understanding of the proper use and possible adverse effects of fluconazole.  All of the patient's questions and concerns were addressed.
Prednisone Counseling:  I discussed with the patient the risks of prolonged use of prednisone including but not limited to weight gain, insomnia, osteoporosis, mood changes, diabetes, susceptibility to infection, glaucoma and high blood pressure.  In cases where prednisone use is prolonged, patients should be monitored with blood pressure checks, serum glucose levels and an eye exam.  Additionally, the patient may need to be placed on GI prophylaxis, PCP prophylaxis, and calcium and vitamin D supplementation and/or a bisphosphonate.  The patient verbalized understanding of the proper use and the possible adverse effects of prednisone.  All of the patient's questions and concerns were addressed.
Topical Retinoid counseling:  Patient advised to apply a pea-sized amount only at bedtime and wait 30 minutes after washing their face before applying.  If too drying, patient may add a non-comedogenic moisturizer. The patient verbalized understanding of the proper use and possible adverse effects of retinoids.  All of the patient's questions and concerns were addressed.
Oxybutynin Counseling:  I discussed with the patient the risks of oxybutynin including but not limited to skin rash, drowsiness, dry mouth, difficulty urinating, and blurred vision.
Dutasteride Pregnancy And Lactation Text: This medication is absolutely contraindicated in women, especially during pregnancy and breast feeding. Feminization of male fetuses is possible if taking while pregnant.
Winlevi Pregnancy And Lactation Text: This medication is considered safe during pregnancy and breastfeeding.
Cellcept Counseling:  I discussed with the patient the risks of mycophenolate mofetil including but not limited to infection/immunosuppression, GI upset, hypokalemia, hypercholesterolemia, bone marrow suppression, lymphoproliferative disorders, malignancy, GI ulceration/bleed/perforation, colitis, interstitial lung disease, kidney failure, progressive multifocal leukoencephalopathy, and birth defects.  The patient understands that monitoring is required including a baseline creatinine and regular CBC testing. In addition, patient must alert us immediately if symptoms of infection or other concerning signs are noted.
Albendazole Counseling:  I discussed with the patient the risks of albendazole including but not limited to cytopenia, kidney damage, nausea/vomiting and severe allergy.  The patient understands that this medication is being used in an off-label manner.
Doxycycline Pregnancy And Lactation Text: This medication is Pregnancy Category D and not consider safe during pregnancy. It is also excreted in breast milk but is considered safe for shorter treatment courses.
Spevigo Counseling: I discussed with the patient the risks of Spevigo including but not limited to fatigue, nasuea, vomiting, headache, pruritus, urinary tract infection, an infusion related reactions.  The patient understands that monitoring is required including screening for tuberculosis at baseline and yearly screening thereafter while continuing Spevigo therapy. They should contact us if symptoms of infection or other concerning signs are noted.
Tranexamic Acid Pregnancy And Lactation Text: It is unknown if this medication is safe during pregnancy or breast feeding.
Eucrisa Counseling: Patient may experience a mild burning sensation during topical application. Eucrisa is not approved in children less than 2 years of age.
Terbinafine Counseling: Patient counseling regarding adverse effects of terbinafine including but not limited to headache, diarrhea, rash, upset stomach, liver function test abnormalities, itching, taste/smell disturbance, nausea, abdominal pain, and flatulence.  There is a rare possibility of liver failure that can occur when taking terbinafine.  The patient understands that a baseline LFT and kidney function test may be required. The patient verbalized understanding of the proper use and possible adverse effects of terbinafine.  All of the patient's questions and concerns were addressed.
Opioid Pregnancy And Lactation Text: These medications can lead to premature delivery and should be avoided during pregnancy. These medications are also present in breast milk in small amounts.
Bexarotene Counseling:  I discussed with the patient the risks of bexarotene including but not limited to hair loss, dry lips/skin/eyes, liver abnormalities, hyperlipidemia, pancreatitis, depression/suicidal ideation, photosensitivity, drug rash/allergic reactions, hypothyroidism, anemia, leukopenia, infection, cataracts, and teratogenicity.  Patient understands that they will need regular blood tests to check lipid profile, liver function tests, white blood cell count, thyroid function tests and pregnancy test if applicable.
Rinvoq Counseling: I discussed with the patient the risks of Rinvoq therapy including but not limited to upper respiratory tract infections, shingles, cold sores, bronchitis, nausea, cough, fever, acne, and headache. Live vaccines should be avoided.  This medication has been linked to serious infections; higher rate of mortality; malignancy and lymphoproliferative disorders; major adverse cardiovascular events; thrombosis; thrombocytopenia, anemia, and neutropenia; lipid elevations; liver enzyme elevations; and gastrointestinal perforations.
Protopic Pregnancy And Lactation Text: This medication is Pregnancy Category C. It is unknown if this medication is excreted in breast milk when applied topically.
Azelaic Acid Counseling: Patient counseled that medicine may cause skin irritation and to avoid applying near the eyes.  In the event of skin irritation, the patient was advised to reduce the amount of the drug applied or use it less frequently.   The patient verbalized understanding of the proper use and possible adverse effects of azelaic acid.  All of the patient's questions and concerns were addressed.
Bimzelx Counseling:  I discussed with the patient the risks of Bimzelx including but not limited to depression, immunosuppression, allergic reactions and infections.  The patient understands that monitoring is required including a PPD at baseline and must alert us or the primary physician if symptoms of infection or other concerning signs are noted.
Topical Metronidazole Counseling: Metronidazole is a topical antibiotic medication. You may experience burning, stinging, redness, or allergic reactions.  Please call our office if you develop any problems from using this medication.
Azithromycin Pregnancy And Lactation Text: This medication is considered safe during pregnancy and is also secreted in breast milk.
Calcipotriene Counseling:  I discussed with the patient the risks of calcipotriene including but not limited to erythema, scaling, itching, and irritation.
Nsaids Counseling: NSAID Counseling: I discussed with the patient that NSAIDs should be taken with food. Prolonged use of NSAIDs can result in the development of stomach ulcers.  Patient advised to stop taking NSAIDs if abdominal pain occurs.  The patient verbalized understanding of the proper use and possible adverse effects of NSAIDs.  All of the patient's questions and concerns were addressed.
Tremfya Counseling: I discussed with the patient the risks of guselkumab including but not limited to immunosuppression, serious infections, worsening of inflammatory bowel disease and drug reactions.  The patient understands that monitoring is required including a PPD at baseline and must alert us or the primary physician if symptoms of infection or other concerning signs are noted.
Minoxidil Counseling: Minoxidil is a topical medication which can increase blood flow where it is applied. It is uncertain how this medication increases hair growth. Side effects are uncommon and include stinging and allergic reactions.
Soolantra Pregnancy And Lactation Text: This medication is Pregnancy Category C. This medication is considered safe during breast feeding.
Finasteride Male Counseling: Finasteride Counseling:  I discussed with the patient the risks of use of finasteride including but not limited to decreased libido, decreased ejaculate volume, gynecomastia, and depression. Women should not handle medication.  All of the patient's questions and concerns were addressed.
Colchicine Counseling:  Patient counseled regarding adverse effects including but not limited to stomach upset (nausea, vomiting, stomach pain, or diarrhea).  Patient instructed to limit alcohol consumption while taking this medication.  Colchicine may reduce blood counts especially with prolonged use.  The patient understands that monitoring of kidney function and blood counts may be required, especially at baseline. The patient verbalized understanding of the proper use and possible adverse effects of colchicine.  All of the patient's questions and concerns were addressed.
Enbrel Counseling:  I discussed with the patient the risks of etanercept including but not limited to myelosuppression, immunosuppression, autoimmune hepatitis, demyelinating diseases, lymphoma, and infections.  The patient understands that monitoring is required including a PPD at baseline and must alert us or the primary physician if symptoms of infection or other concerning signs are noted.
Rinvoq Pregnancy And Lactation Text: Based on animal studies, Rinvoq may cause embryo-fetal harm when administered to pregnant women.  The medication should not be used in pregnancy.  Breastfeeding is not recommended during treatment and for 6 days after the last dose.
Winlevi Counseling:  I discussed with the patient the risks of topical clascoterone including but not limited to erythema, scaling, itching, and stinging. Patient voiced their understanding.
Opioid Counseling: I discussed with the patient the potential side effects of opioids including but not limited to addiction, altered mental status, and depression. I stressed avoiding alcohol, benzodiazepines, muscle relaxants and sleep aids unless specifically okayed by a physician. The patient verbalized understanding of the proper use and possible adverse effects of opioids. All of the patient's questions and concerns were addressed. They were instructed to flush the remaining pills down the toilet if they did not need them for pain.
Protopic Counseling: Patient may experience a mild burning sensation during topical application. Protopic is not approved in children less than 2 years of age. There have been case reports of hematologic and skin malignancies in patients using topical calcineurin inhibitors although causality is questionable.
Glycopyrrolate Counseling:  I discussed with the patient the risks of glycopyrrolate including but not limited to skin rash, drowsiness, dry mouth, difficulty urinating, and blurred vision.
Erythromycin Counseling:  I discussed with the patient the risks of erythromycin including but not limited to GI upset, allergic reaction, drug rash, diarrhea, increase in liver enzymes, and yeast infections.
Valtrex Counseling: I discussed with the patient the risks of valacyclovir including but not limited to kidney damage, nausea, vomiting and severe allergy.  The patient understands that if the infection seems to be worsening or is not improving, they are to call.
Bexarotene Pregnancy And Lactation Text: This medication is Pregnancy Category X and should not be given to women who are pregnant or may become pregnant. This medication should not be used if you are breast feeding.
Ketoconazole Pregnancy And Lactation Text: This medication is Pregnancy Category C and it isn't know if it is safe during pregnancy. It is also excreted in breast milk and breast feeding isn't recommended.
Nsaids Pregnancy And Lactation Text: These medications are considered safe up to 30 weeks gestation. It is excreted in breast milk.
Hydroxyzine Pregnancy And Lactation Text: This medication is not safe during pregnancy and should not be taken. It is also excreted in breast milk and breast feeding isn't recommended.
Adbry Pregnancy And Lactation Text: It is unknown if this medication will adversely affect pregnancy or breast feeding.
Klisyri Pregnancy And Lactation Text: It is unknown if this medication can harm a developing fetus or if it is excreted in breast milk.
Bactrim Counseling:  I discussed with the patient the risks of sulfa antibiotics including but not limited to GI upset, allergic reaction, drug rash, diarrhea, dizziness, photosensitivity, and yeast infections.  Rarely, more serious reactions can occur including but not limited to aplastic anemia, agranulocytosis, methemoglobinemia, blood dyscrasias, liver or kidney failure, lung infiltrates or desquamative/blistering drug rashes.
Libtayo Pregnancy And Lactation Text: This medication is contraindicated in pregnancy and when breast feeding.
Infliximab Counseling:  I discussed with the patient the risks of infliximab including but not limited to myelosuppression, immunosuppression, autoimmune hepatitis, demyelinating diseases, lymphoma, and serious infections.  The patient understands that monitoring is required including a PPD at baseline and must alert us or the primary physician if symptoms of infection or other concerning signs are noted.
Soolantra Counseling: I discussed with the patients the risks of topial Soolantra. This is a medicine which decreases the number of mites and inflammation in the skin. You experience burning, stinging, eye irritation or allergic reactions.  Please call our office if you develop any problems from using this medication.
Propranolol Counseling:  I discussed with the patient the risks of propranolol including but not limited to low heart rate, low blood pressure, low blood sugar, restlessness and increased cold sensitivity. They should call the office if they experience any of these side effects.
Finasteride Female Counseling: Finasteride Counseling:  I discussed with the patient the risks of use of finasteride including but not limited to decreased libido and sexual dysfunction. Explained the teratogenic nature of the medication and stressed the importance of not getting pregnant during treatment. All of the patient's questions and concerns were addressed.
Dupixent Pregnancy And Lactation Text: This medication likely crosses the placenta but the risk for the fetus is uncertain. This medication is excreted in breast milk.
Erythromycin Pregnancy And Lactation Text: This medication is Pregnancy Category B and is considered safe during pregnancy. It is also excreted in breast milk.
Glycopyrrolate Pregnancy And Lactation Text: This medication is Pregnancy Category B and is considered safe during pregnancy. It is unknown if it is excreted breast milk.
Elidel Counseling: Patient may experience a mild burning sensation during topical application. Elidel is not approved in children less than 2 years of age. There have been case reports of hematologic and skin malignancies in patients using topical calcineurin inhibitors although causality is questionable.
Tetracycline Counseling: Patient counseled regarding possible photosensitivity and increased risk for sunburn.  Patient instructed to avoid sunlight, if possible.  When exposed to sunlight, patients should wear protective clothing, sunglasses, and sunscreen.  The patient was instructed to call the office immediately if the following severe adverse effects occur:  hearing changes, easy bruising/bleeding, severe headache, or vision changes.  The patient verbalized understanding of the proper use and possible adverse effects of tetracycline.  All of the patient's questions and concerns were addressed. Patient understands to avoid pregnancy while on therapy due to potential birth defects.
Cyclophosphamide Counseling:  I discussed with the patient the risks of cyclophosphamide including but not limited to hair loss, hormonal abnormalities, decreased fertility, abdominal pain, diarrhea, nausea and vomiting, bone marrow suppression and infection. The patient understands that monitoring is required while taking this medication.
Ketoconazole Counseling:   Patient counseled regarding improving absorption with orange juice.  Adverse effects include but are not limited to breast enlargement, headache, diarrhea, nausea, upset stomach, liver function test abnormalities, taste disturbance, and stomach pain.  There is a rare possibility of liver failure that can occur when taking ketoconazole. The patient understands that monitoring of LFTs may be required, especially at baseline. The patient verbalized understanding of the proper use and possible adverse effects of ketoconazole.  All of the patient's questions and concerns were addressed.
Sotyktu Counseling:  I discussed the most common side effects of Sotyktu including: common cold, sore throat, sinus infections, cold sores, canker sores, folliculitis, and acne.? I also discussed more serious side effects of Sotyktu including but not limited to: serious allergic reactions; increased risk for infections such as TB; cancers such as lymphomas; rhabdomyolysis and elevated CPK; and elevated triglycerides and liver enzymes.?
Benzoyl Peroxide Counseling: Patient counseled that medicine may cause skin irritation and bleach clothing.  In the event of skin irritation, the patient was advised to reduce the amount of the drug applied or use it less frequently.   The patient verbalized understanding of the proper use and possible adverse effects of benzoyl peroxide.  All of the patient's questions and concerns were addressed.
Isotretinoin Counseling: Patient should get monthly blood tests, not donate blood, not drive at night if vision affected, not share medication, and not undergo elective surgery for 6 months after tx completed. Side effects reviewed, pt to contact office should one occur.

## 2024-09-11 NOTE — PROCEDURE: TREATMENT REGIMEN
Plan: 9/11/24 pt states her PCP has sent a referral for Dr. delacruz but she has not heard from them yet. Her PCP did lab work as well
Continue Regimen: - triamcinolone acetonide 0.1 % topical cream. Apply on affected skin BID as needed for 2 weeks alternating with 1 week off., repeat as needed. Never on face, underarms or groin. Taper to once a day, then every other day\\n\\n- Zyrtec 1-2 pills AM & PM
Detail Level: Zone
Plan: 9/11/24 pt states her PCP has sent a referral for Dr. delacruz but she has not heard from them yet. Her PCP did lab work as well.

## 2024-09-11 NOTE — PROCEDURE: MIPS QUALITY
Quality 47: Advance Care Plan: Advance Care Planning discussed and documented in the medical record; patient did not wish or was not able to name a surrogate decision maker or provide an advance care plan.
Quality 226: Preventive Care And Screening: Tobacco Use: Screening And Cessation Intervention: Patient screened for tobacco use and is an ex/non-smoker
Detail Level: Detailed
Quality 130: Documentation Of Current Medications In The Medical Record: Current Medications Documented
Quality 486: Dermatitis - Improvement In Patient-Reported Itch Severity: Itch severity assessment score was not reduced by at least 3 points from the initial (index) score to the follow-up visit score or assessment was not completed during the follow-up encounter

## 2024-12-02 ENCOUNTER — TELEPHONE (OUTPATIENT)
Dept: MEDICAL GROUP | Facility: MEDICAL CENTER | Age: 69
End: 2024-12-02
Payer: COMMERCIAL

## 2024-12-02 NOTE — TELEPHONE ENCOUNTER
Please notify the patient that her mammogram done at [facility] is negative, she can repeat the mammogram in 1 year

## 2025-01-12 ENCOUNTER — TELEPHONE (OUTPATIENT)
Dept: MEDICAL GROUP | Facility: MEDICAL CENTER | Age: 70
End: 2025-01-12
Payer: COMMERCIAL

## 2025-01-12 PROBLEM — M54.50 LOW BACK PAIN: Status: ACTIVE | Noted: 2025-01-12

## 2025-01-12 NOTE — TELEPHONE ENCOUNTER
Need records  (1) [facility] last 6 months  (2) skin cancer dermatology [place] for the last 12 months

## 2025-01-12 NOTE — LETTER
[facility]  Feliciano Olson M.D.  [address]  [place] [address]  Fax: [phone]   Authorization for Release/Disclosure of   Protected Health Information   Name: ADRIENNE HAJI : 1955 SSN: [id number]   Address: [address] [place] [address]  [place] [address] Phone:    [phone] (home)    I authorize the entity listed below to release/disclose the PHI below to:   [facility] [employer] [facility]/Feliciano Olson M.D. and Feliciano Olson M.D.   Provider or Entity Name:  [facility]   Address   City, State, Zip   Phone:      Fax:  [phone]   Reason for request: continuity of care   Information to be released:    [  ] LAST COLONOSCOPY,  including any PATH REPORT and follow-up  [  ] LAST FIT/COLOGUARD RESULT [  ] LAST DEXA  [  ] LAST MAMMOGRAM  [  ] LAST PAP  [  ] LAST LABS [  ] RETINA EXAM REPORT  [  ] IMMUNIZATION RECORDS  [ x ] Release last 6 months      [  ] Check here and initial the line next to each item to release ALL health information INCLUDING  _____ Care and treatment for drug and / or alcohol abuse  _____ HIV testing, infection status, or AIDS  _____ Genetic Testing    DATES OF SERVICE OR TIME PERIOD TO BE DISCLOSED: _____________  I understand and acknowledge that:  * This Authorization may be revoked at any time by you in writing, except if your health information has already been used or disclosed.  * Your health information that will be used or disclosed as a result of you signing this authorization could be re-disclosed by the recipient. If this occurs, your re-disclosed health information may no longer be protected by State or Federal laws.  * You may refuse to sign this Authorization. Your refusal will not affect your ability to obtain treatment.  * This Authorization becomes effective upon signing and will  on (date) __________.      If no date is indicated, this Authorization will  one (1) year from the signature date.    Name: Adrienne Haji  Signature:continuity of care Date:    1/13/2025     PLEASE FAX REQUESTED RECORDS BACK TO: [phone]

## 2025-01-12 NOTE — LETTER
[facility]  Feliciano Olson M.D.  [address]  [place] [address]  Fax: [phone]   Authorization for Release/Disclosure of   Protected Health Information   Name: ADRIENNE HAJI : 1955 SSN: [id number]   Address: [address] [place] [address]  [place] [address] Phone:    [phone] (home)    I authorize the entity listed below to release/disclose the PHI below to:   [employer] [facility]/Feliciano Olson M.D. and Feliciano Olson M.D.   Provider or Entity Name:  Skin cancer and dermatology   Address   City, [facility]   Phone:      Fax:  [phone]   Reason for request: continuity of care   Information to be released:    [  ] LAST COLONOSCOPY,  including any PATH REPORT and follow-up  [  ] LAST FIT/COLOGUARD RESULT [  ] LAST DEXA  [  ] LAST MAMMOGRAM  [  ] LAST PAP  [  ] LAST LABS [  ] RETINA EXAM REPORT  [  ] IMMUNIZATION RECORDS  [ x ] Release last 12 months      [  ] Check here and initial the line next to each item to release ALL health information INCLUDING  _____ Care and treatment for drug and / or alcohol abuse  _____ HIV testing, infection status, or AIDS  _____ Genetic Testing    DATES OF SERVICE OR TIME PERIOD TO BE DISCLOSED: _____________  I understand and acknowledge that:  * This Authorization may be revoked at any time by you in writing, except if your health information has already been used or disclosed.  * Your health information that will be used or disclosed as a result of you signing this authorization could be re-disclosed by the recipient. If this occurs, your re-disclosed health information may no longer be protected by State or Federal laws.  * You may refuse to sign this Authorization. Your refusal will not affect your ability to obtain treatment.  * This Authorization becomes effective upon signing and will  on (date) __________.      If no date is indicated, this Authorization will  one (1) year from the signature date.    Name: Adrienne  Andrew  Signature:continuity of care Date:   1/13/2025     PLEASE FAX REQUESTED RECORDS BACK TO: [phone]

## 2025-01-13 ENCOUNTER — TELEPHONE (OUTPATIENT)
Dept: MEDICAL GROUP | Facility: MEDICAL CENTER | Age: 70
End: 2025-01-13

## 2025-01-13 ENCOUNTER — OFFICE VISIT (OUTPATIENT)
Dept: MEDICAL GROUP | Facility: MEDICAL CENTER | Age: 70
End: 2025-01-13
Payer: COMMERCIAL

## 2025-01-13 VITALS
RESPIRATION RATE: 12 BRPM | SYSTOLIC BLOOD PRESSURE: 104 MMHG | BODY MASS INDEX: 27.17 KG/M2 | OXYGEN SATURATION: 98 % | WEIGHT: 163.1 LBS | TEMPERATURE: 97.9 F | DIASTOLIC BLOOD PRESSURE: 58 MMHG | HEIGHT: 65 IN | HEART RATE: 87 BPM

## 2025-01-13 DIAGNOSIS — Z00.00 PREVENTATIVE HEALTH CARE: ICD-10-CM

## 2025-01-13 DIAGNOSIS — E55.9 VITAMIN D DEFICIENCY: ICD-10-CM

## 2025-01-13 DIAGNOSIS — K76.0 HEPATIC STEATOSIS: ICD-10-CM

## 2025-01-13 DIAGNOSIS — E78.5 DYSLIPIDEMIA: Chronic | ICD-10-CM

## 2025-01-13 PROCEDURE — 3074F SYST BP LT 130 MM HG: CPT | Performed by: INTERNAL MEDICINE

## 2025-01-13 PROCEDURE — 3078F DIAST BP <80 MM HG: CPT | Performed by: INTERNAL MEDICINE

## 2025-01-13 PROCEDURE — 99397 PER PM REEVAL EST PAT 65+ YR: CPT | Performed by: INTERNAL MEDICINE

## 2025-01-13 RX ORDER — CETIRIZINE HYDROCHLORIDE 10 MG/1
10 TABLET ORAL DAILY
COMMUNITY

## 2025-01-13 SDOH — ECONOMIC STABILITY: INCOME INSECURITY: HOW HARD IS IT FOR YOU TO PAY FOR THE VERY BASICS LIKE FOOD, HOUSING, MEDICAL CARE, AND HEATING?: NOT HARD AT ALL

## 2025-01-13 SDOH — ECONOMIC STABILITY: INCOME INSECURITY: IN THE LAST 12 MONTHS, WAS THERE A TIME WHEN YOU WERE NOT ABLE TO PAY THE MORTGAGE OR RENT ON TIME?: NO

## 2025-01-13 SDOH — ECONOMIC STABILITY: FOOD INSECURITY: WITHIN THE PAST 12 MONTHS, YOU WORRIED THAT YOUR FOOD WOULD RUN OUT BEFORE YOU GOT MONEY TO BUY MORE.: NEVER TRUE

## 2025-01-13 SDOH — HEALTH STABILITY: MENTAL HEALTH
STRESS IS WHEN SOMEONE FEELS TENSE, NERVOUS, ANXIOUS, OR CAN'T SLEEP AT NIGHT BECAUSE THEIR MIND IS TROUBLED. HOW STRESSED ARE YOU?: ONLY A LITTLE

## 2025-01-13 SDOH — HEALTH STABILITY: PHYSICAL HEALTH: ON AVERAGE, HOW MANY DAYS PER WEEK DO YOU ENGAGE IN MODERATE TO STRENUOUS EXERCISE (LIKE A BRISK WALK)?: 4 DAYS

## 2025-01-13 SDOH — ECONOMIC STABILITY: FOOD INSECURITY: WITHIN THE PAST 12 MONTHS, THE FOOD YOU BOUGHT JUST DIDN'T LAST AND YOU DIDN'T HAVE MONEY TO GET MORE.: NEVER TRUE

## 2025-01-13 SDOH — HEALTH STABILITY: PHYSICAL HEALTH: ON AVERAGE, HOW MANY MINUTES DO YOU ENGAGE IN EXERCISE AT THIS LEVEL?: 30 MIN

## 2025-01-13 ASSESSMENT — SOCIAL DETERMINANTS OF HEALTH (SDOH)
WITHIN THE PAST 12 MONTHS, YOU WORRIED THAT YOUR FOOD WOULD RUN OUT BEFORE YOU GOT THE MONEY TO BUY MORE: NEVER TRUE
DO YOU BELONG TO ANY CLUBS OR ORGANIZATIONS SUCH AS CHURCH GROUPS UNIONS, FRATERNAL OR ATHLETIC GROUPS, OR SCHOOL GROUPS?: YES
HOW OFTEN DO YOU GET TOGETHER WITH FRIENDS OR RELATIVES?: THREE TIMES A WEEK
HOW HARD IS IT FOR YOU TO PAY FOR THE VERY BASICS LIKE FOOD, HOUSING, MEDICAL CARE, AND HEATING?: NOT HARD AT ALL
HOW OFTEN DO YOU ATTENT MEETINGS OF THE CLUB OR ORGANIZATION YOU BELONG TO?: MORE THAN 4 TIMES PER YEAR
HOW OFTEN DO YOU HAVE SIX OR MORE DRINKS ON ONE OCCASION: NEVER
HOW OFTEN DO YOU GET TOGETHER WITH FRIENDS OR RELATIVES?: THREE TIMES A WEEK
HOW OFTEN DO YOU ATTEND CHURCH OR RELIGIOUS SERVICES?: MORE THAN 4 TIMES PER YEAR
IN A TYPICAL WEEK, HOW MANY TIMES DO YOU TALK ON THE PHONE WITH FAMILY, FRIENDS, OR NEIGHBORS?: MORE THAN THREE TIMES A WEEK
HOW MANY DRINKS CONTAINING ALCOHOL DO YOU HAVE ON A TYPICAL DAY WHEN YOU ARE DRINKING: 1 OR 2
IN THE PAST 12 MONTHS, HAS THE ELECTRIC, GAS, OIL, OR WATER COMPANY THREATENED TO SHUT OFF SERVICE IN YOUR HOME?: NO
HOW OFTEN DO YOU HAVE A DRINK CONTAINING ALCOHOL: PATIENT DECLINED
IN A TYPICAL WEEK, HOW MANY TIMES DO YOU TALK ON THE PHONE WITH FAMILY, FRIENDS, OR NEIGHBORS?: MORE THAN THREE TIMES A WEEK
DO YOU BELONG TO ANY CLUBS OR ORGANIZATIONS SUCH AS CHURCH GROUPS UNIONS, FRATERNAL OR ATHLETIC GROUPS, OR SCHOOL GROUPS?: YES
HOW OFTEN DO YOU ATTEND CHURCH OR RELIGIOUS SERVICES?: MORE THAN 4 TIMES PER YEAR
HOW OFTEN DO YOU ATTENT MEETINGS OF THE CLUB OR ORGANIZATION YOU BELONG TO?: MORE THAN 4 TIMES PER YEAR

## 2025-01-13 ASSESSMENT — LIFESTYLE VARIABLES
AUDIT-C TOTAL SCORE: -1
SKIP TO QUESTIONS 9-10: 0
HOW MANY STANDARD DRINKS CONTAINING ALCOHOL DO YOU HAVE ON A TYPICAL DAY: 1 OR 2
HOW OFTEN DO YOU HAVE A DRINK CONTAINING ALCOHOL: PATIENT DECLINED
HOW OFTEN DO YOU HAVE SIX OR MORE DRINKS ON ONE OCCASION: NEVER

## 2025-01-13 ASSESSMENT — FIBROSIS 4 INDEX: FIB4 SCORE: 1.7

## 2025-01-13 ASSESSMENT — PATIENT HEALTH QUESTIONNAIRE - PHQ9: CLINICAL INTERPRETATION OF PHQ2 SCORE: 0

## 2025-01-13 NOTE — LETTER
[facility]  Feliciano Olson M.D.  [address]  [place] [address]  Fax: [phone]   Authorization for Release/Disclosure of   Protected Health Information   Name: ADRIENNE HAJI : 1955 SSN: [id number]   Address: [address] [place] [address]  [place] [address] Phone:    [phone] (home)    I authorize the entity listed below to release/disclose the PHI below to:   [facility]/Feliciano Olson M.D. and Feliciano Olson M.D.   Provider or Entity Name:  [facility]   Address   City, State, Zip   Phone:      Fax:  [phone]   Reason for request: continuity of care   Information to be released:    [  ] LAST COLONOSCOPY,  including any PATH REPORT and follow-up  [  ] LAST FIT/COLOGUARD RESULT [  ] LAST DEXA  [  ] LAST MAMMOGRAM  [  ] LAST PAP  [  ] LAST LABS [  ] RETINA EXAM REPORT  [  ] IMMUNIZATION RECORDS  [ x ] Release last 12 months      [  ] Check here and initial the line next to each item to release ALL health information INCLUDING  _____ Care and treatment for drug and / or alcohol abuse  _____ HIV testing, infection status, or AIDS  _____ Genetic Testing    DATES OF SERVICE OR TIME PERIOD TO BE DISCLOSED: _____________  I understand and acknowledge that:  * This Authorization may be revoked at any time by you in writing, except if your health information has already been used or disclosed.  * Your health information that will be used or disclosed as a result of you signing this authorization could be re-disclosed by the recipient. If this occurs, your re-disclosed health information may no longer be protected by State or Federal laws.  * You may refuse to sign this Authorization. Your refusal will not affect your ability to obtain treatment.  * This Authorization becomes effective upon signing and will  on (date) __________.      If no date is indicated, this Authorization will  one (1) year from the signature date.    Name: Adrienne Haji  Signature:continuity of care Date:    1/15/2025     PLEASE FAX REQUESTED RECORDS BACK TO: [phone]

## 2025-01-13 NOTE — PROGRESS NOTES
Subjective     Adrienne Morales is a 69 y.o. female who presents with Annual Exam (routine) and Urticaria            HPI    Here for annual exam  Medications, allergies, medical history, surgical history, social history, family history  reviewed and updated  Sees skin cancer dermatology   Sees FEM gyn   Sees decon endocrine has ultrasound thyroid done at [facility] end of last year  Seen by [facility] in december for low back pain, improving  No hearing changes, dental exam twice per year, eye exam annually   Two sons one in [facility] both are healthy  Exercise walks once to twice per week and total four days per week, 3 miles at a time  No falls, no balance issues  Tries to eat a healthy diet, limiting processed foods in her diet  Etoh 2 per week and no tobacco, no soda, drinks water and coffee during the day does add some sugar and creamer to her coffee  Sleep 7 to 8 hours   No mood changes, no memory issues  Hives over trunk worse with hot shower, may be related to stress, not related to diet, sometimes will occur on thighs, does not itch over hives area   On zyrtec once per day at night which does not seem to help the itching or urticaria  The itching over back and has tried steroid cream from dermatology, no history of allergic rhinitis, asthma, food allergies or pet allergies  FH sister heart disease, brothers no change, son lives in [facility] and another son in [place] and has completed a power of      Current Outpatient Medications   Medication Sig Dispense Refill    cetirizine (ZYRTEC) 10 MG Tab Take 10 mg by mouth every day.      Omega-3 Fatty Acids (FISH OIL PO) Take 1,280 Int'l Units/day by mouth.      Calcium Carbonate Antacid 1000 MG Chew Tab Chew.      Propylene Glycol (SYSTANE BALANCE OP) Administer  into affected eye(s).      estradiol (CLIMARA) 0.0375 MG/24HR patch Place 1 Patch on the skin every 7 days. 4 Patch 0    acyclovir (ZOVIRAX) 5 % Ointment APPLY TO THE AFFECTED AREA EVERY 2 HOURS AS  DIRECTED      Multiple Vitamins-Minerals (PRESERVISION AREDS 2 PO) PreserVision AREDS 2      sumatriptan (IMITREX) 100 MG tablet Take 1 Tablet by mouth one time as needed for Migraine. TAKE 1 TABLET BY MOUTH ONCE AS NEEDED FOR MIGRAINE FOR UP TO 1 DOSE 10 Tablet 6    tacrolimus (PROTOPIC) 0.1 % Ointment       Cholecalciferol (VITAMIN D) 2000 UNIT Tab Take  by mouth every day.      Naproxen Sodium (ALEVE PO) Take  by mouth.       No current facility-administered medications for this visit.                      thyroid nodule  2/12/22 ultrasound soft tissue neck at [facility] left upper pole nodule 0.8 x 0.7 x 0.6 cm TIRADS 2/12/22 ultrasound soft tissue neck at [facility] left upper pole nodule 0.8 x 0.7 x 0.6 cm TIRADS 5 suspicious   4/26/22  endocrine note labs ordered, repeat ultrasound thyroid in 6 months to monitor size and progression  9/27/22 tsh 2.2,free t4 1.23,free t3 3.1,TPO 11 per  endocrine  9/28/22 ultrasound thyroid done at [facility] per  endocrinology left upper pole nodule 0.8 x 0.8 x 0.6 cm tirad 7, no change compared to previous  10/3/22 tsh 2.2,free t4 1.2,free t3 3.1,TPO 11 (0-34) per  endocrine  10/7/22  endocrine note repeat thyroid ultrasound one year  7/21/23 tsh 1.4  10/10/23 bea at [facility] endocrinology note thyroid labs ordered and ultrasound thyroid  11/21/23 [facility] endocrine note repeat thyroid ultrasound looks good, recheck 1 year   2/7/24 tsh 1.8,free t4 1.2,free t3 3.5   2/29/24 [facility] endocrine note repeat ultrasound thyroid 1 year, start estradiol patch 0.0375 mg/24 hours every 3 days  6/24/24 [facility] endocrine note repeat us thyroid ordered for october  10/22/24 [facility] endocrine note     Sleep disordered breathing  12/12/19 OPO through preferred see if perhaps she has sleep apnea contributing to migraine headaches  1/6/20 OPO through preferred time less than 88% 23 minutes, low saturation 76%, basal saturation 92%     skin lesion  [facility]   [facility]  1/26/23 teressa beyer skin cancer dermatology note hand dermatitis start tacrolimus 0.1% ointment bid  9/11/23 teressa beyer dermatology note   11/27/23 teressa beyer dermatology note dermatitis back start triamcinolone 0.1% cream  bid, zyrtec  1/17/24 teressa beyer skin cancer dermatology note dermatitis back start triamcinolone 0.1% cream bid, zyrtec      Preventative health  9/12/14 tdap  12/31/19 hep c ab negative  1/15/20 cologuard negative  12/20/22 declines prevnar   6/22/23 dr.marlow matos gynecology note   1/9/24 dexa at [facility] LS-0.3,hip-2.0  2/2/24 colon per [facility] moderate diverticulosis, single adenoma repeat 7 years  5/21/24 shingrix second  6/18/24 vit d 40 done at [facility] per [facility] endocrine  11/26/24 mammogram at [facility]      post menopausal  11/21/23 [facility] endocrine note start estradiol patch 0.025 mg weekly every 3 days and testosterone powder 5 mg/gm apply 0.5 gm topical daily  2/7/24 testosterone 41,SHBG 35,LH 28,FSH 46,estradiol 28  2/29/24 [facility] endocrine note repeat ultrasound thyroid 1 year, start estradiol patch 0.0375 mg/24 hours every 3 days  6/24/24 [facility] endocrine note start testosterone powder 5 mg/gm apply 0.5 gm daily and continue estradiol patch 0.0375 mg/24 hours twice weekly  10/22/24 [facility] endocrine note      Osteopenia  12/10/12 dexa LS -0.9, hip -1.2 per gyn  9/16/14 vit d 28 start 2000 units  10/6/15 dexa LS 0.0,hip -1.8 ;FRAX 9% major,1.0% hip  10/14/15 vit d 32   7/22/16 start 2000 units   10/19/16 vit d 35 on 2000 units [facility]  10/20/17 vit d 37 done at [facility]  11/10/17 dexa LS-0.5,hip-2.0;FRAX 10% major,1.5% hip done at [facility]  11/27/18 vit d 42  12/31/19 dexa at [facility] LS-0.5,hip-1.9 done at [facility],FRAX 10.8% major,2.5% hip  1/11/20 vit d 43  1/6/21 vit d 44  1/5/22 dexa at [facility] LS-0.4,hip-1.8  1/13/22 vit d 44  9/27/22 vit d 44 per  endocrine  7/21/23 PTH 42,celiac panel negative  1/9/24 dexa at [facility]  LS-0.3,hip-2.0  6[facility]18[facility]    low back pain   12/20/24 [facility] note low back pain   12/20/24 x-ray lumbar spine flexion-extension views, questionable chronic changes mid thoracic spine T8-T10, no acute fractures     Insomnia uses Ambien for travel     Impaired glucose metabolism  12/31/19 A1c 5.8%  1/6/21 A1c 5.6%  1/13/22 A1c 5.7%  3/8/23 A1c 5.9%  8/18/24 A1c 5.6%     History of perforated tympanic membrane     History migraine tried Inderal previously but caused low blood pressure  Tried inderal in her 20s, but caused low bp  10/24/17 more migraine recently and declines medication   12/11/18 uses imitrex as needed  12/14/20 on imitrex as needed     History endometrial cancer  3/10 JACKLYN BSO for stage I endometrial cancer   2013 gyn exam  sees twice per year  9/14 sees  gyn  3/7/17 pap per gyn   3/18 saw  gyn   6/22/23  [facility] gynecology note   11/21/23 [facility] endocrine note start estradiol patch 0.025 mg weekly every 3 days and testosterone powder 5 mg/gm apply 0.5 gm topical daily     history covid  1/17/22 patient MyChart message positive home test, will order PCR  1/18/22 covid positive rapid test     Family history cardiovascular disease  Father CABG and sister heart disease  11/20/13 echo normal LV 60%  12/10/13 CT calcium score 0     Dyslipidemia  11/20/13 echo normal LV 60%  11/14/13 chol 191,trig 113,hdl 61,ldl 107  12/10/13 CT calcium score 0  9/16/14 chol 206,trig 91,hdl 74,ldl 114  10/14/15 chol 215,trig 83,hdl 81,ldl 117  10/19/16 chol 189,trig 127,hdl 61,ldl 103 done at [facility]  10/24/17 chol 205,trig 83,hdl 66,ldl 121  11/27/18 chol 204,trig 121,hdl 62,ldl 121  12/31/19 chol 204,trig 130,hdl 65,ldl 113  1/6/21 chol 206,trig 154,hdl 61,ldl 118; 10 year risk 5.75%  1/13/22 chol 213,trig 158,hdl 57,ldl 128; 10 year risk 4.5%  3/8/23 chol 218,trig 110,hdl 67,ldl 132; 10 year risk 4.9%  8/18/24 chol 202,trig 125,hdl 58,ldl 122;  10 year risk 5.7%     adenoma  2/2/24 colon per [facility] moderate diverticulosis, single adenoma repeat 7 years     abn liver enzymes  9/15/14 AST 22,ALT 35,alk phos 118,bili 0.3  11/27/18 AST 42,ALT 50,alk phos 121,bili 0.3  12/31/19 AST 56,ALT 30,alk phos 122,bili 0.4  1/8/20 ultrasound abdomen done at [facility], diffusely echogenic liver likely representing hepatic steatosis, no focal mass, common bile duct within normal limits 0.3 cm, pancreas unremarkable, gallbladder and spleen unremarkable  1/11/20 alk phos 137, acute hepatitis panel negative, iron 108,%sat 30, ferritin 75, SPEP negative, AMA negative, smooth muscle antibody negative  5/22/20 AST 35,ALT 40,GGT 69, alkaline phosphatase 115, liver fraction 58% (18-85%), bone fractions 42% (14-68%), bilirubin 0.2  1/6/21 alk phos 142,AST 39,ALT 73,bili 0.3  4/26/21 AST 66, ALT 89, GGT 95, fibrosis score 0.23 (stage F0-F1), steatosis score 0.65 (S2 moderate steatosis), BANKS 0.25 (N0)  1/13/22 alk phos 133(),bili 0.3,AST 24,ALT 40,GGT 70   3/8/23 alk phos 171,AST 35,ALT 62,bili 0.4  5/19/23 ultrasound right upper quadrant done at [facility], liver diffusely echogenic likely representing hepatic steatosis, no focal mass, normal portal vein blood flow, gallbladder negative, common bile duct 0.2 cm, pancreas unremarkable, normal spleen size  7/21/23 alk phos 123 (),liver fraction 61% (18-85%), bone fraction 39% (14-68%),AMA<20,AST 21,ALT 37,bili 0.5,,celiac panel negative,PTH 42,tsh 1.4  8/18/24 AST 34,ALT 44,alk phos 96,bili 0.4             Patient Active Problem List   Diagnosis    Preventative health care    Insomnia    History of endometrial cancer    History of migraine    Osteopenia    History of perforated tympanic membrane    Family history of cardiovascular disease    Dyslipidemia    Abnormal liver function    Dermatochalasis    Impaired glucose metabolism    Sleep disorder breathing    History of shingles    History of COVID-19    Thyroid nodule    Skin  "lesion    Post-menopausal    Patient has active power of  for health care    Adenoma of colon    Low back pain       Depression Screening  Little interest or pleasure in doing things?  0 - not at all  Feeling down, depressed , or hopeless? 0 - not at all  Patient Health Questionnaire Score: 0              Patient Care Team:  Feliciano Olson M.D. as PCP - General (Internal Medicine)    ROS  No reflux  No constipation or loose stools  No incontinence of urine with activity  Recent back pain flareup, improved, no sciatica         Objective     /58   Pulse 87   Temp 36.6 °C (97.9 °F) (Temporal)   Resp 12   Ht 1.651 m (5' 5\")   Wt 74 kg (163 lb 1.6 oz)   SpO2 98%   BMI 27.14 kg/m²      Physical Exam  Vitals and nursing note reviewed.   Constitutional:       Appearance: Normal appearance.   HENT:      Head: Normocephalic and atraumatic.      Right Ear: Tympanic membrane and external ear normal.      Left Ear: Tympanic membrane and external ear normal.      Nose: Nose normal.      Mouth/Throat:      Pharynx: Oropharynx is clear. No oropharyngeal exudate.   Eyes:      Conjunctiva/sclera: Conjunctivae normal.   Cardiovascular:      Rate and Rhythm: Normal rate and regular rhythm.      Heart sounds: Normal heart sounds. No murmur heard.  Pulmonary:      Effort: No respiratory distress.      Breath sounds: Normal breath sounds. No wheezing.   Abdominal:      General: There is no distension.      Palpations: Abdomen is soft.      Tenderness: There is no abdominal tenderness. There is no guarding.   Musculoskeletal:         General: No swelling.      Cervical back: Neck supple. No rigidity.   Lymphadenopathy:      Cervical: No cervical adenopathy.   Skin:     Coloration: Skin is not jaundiced.      Findings: No bruising, erythema or rash.   Neurological:      Mental Status: She is alert.   Psychiatric:         Mood and Affect: Mood normal.         Behavior: Behavior normal.          Back no urticaria or " hives    Normal straight leg raise right and left lower extremity, normal hip flexion, extension, internal/external rotation bilateral  No lower extremity edema  No petechia or purpura                   Assessment  #! Annual exam    #2 thyroid nodule followed by endocrinology had recent ultrasound at [facility]    #3 urticaria to be happening over trunk bilateral occasion will be on her right leg, this is not itchy, she has separate itching of her back at times, urticaria and itching do not seem to be concurrent, no obvious triggers    #4 recent episode low back pain seen at [facility], x-ray showed chronic T8 T10 changes but no fracture    #5 history endometrial cancer followed by gynecology has upcoming appoint with FEM    #6 dyslipidemia diet controlled 8/18/24 chol 202,trig 125,hdl 58,ldl 122; 10 year risk 5.7%    #7 family history of cardiac disease father and sister    #8 history of hepatic steatosis by Fibro[employer] testing 2021, ultrasound right upper quadrant May 2023 showed likely hepatic steatosis, no regular alcohol    #9 skin lesions followed by dermatology      Plan  #1 health maintenance reviewed and updated    #2 old records [facility] ultrasound thyroid ordered by endocrinology    #3 old records [facility] endocrinology    #4 old records skin cancer dermatology [place]    #5 has upcoming appointment with dr.reyes at [facility] allergy for itching and urticaria we will await their consultation    #6 has follow-up appoint with [facility] gynecology will obtain old records    #7 mammogram and bone density next year    #8 recommend influenza, Prevnar 20, Tdap vaccine, she will consider but declines today    #9 continue work on a good nutrition, exercise program    #10 add weight training and, would recommend she get a  at the fitness center and come up with the starting exercise program incorporating weight training and stretching    #11 [facility] coronary calcium score for risk stratification understanding this  test may not be covered by her insurance, out-of-pocket expense at [facility] would be about $100 but I think this would give us good information given her family history of cardiac disease, she will continue limiting processed foods in her diet    #12 old records laboratory testing done at [facility] in June and October of last year    #13 recommend keeping a symptom diary with regards to urticaria and itching until she sees allergy immunology for upcoming appointment, also obtain records from dermatology, she can continue Zyrtec every evening for now and stop that 1 week prior to seeing allergy    #14 follow-up 1 year    #15 preventative labs, also active follow-up [employer]ro[employer] testing, standard labs as well as apolipoprotein lipoprotein a given family history of heart disease, dyslipidemia diet controlled

## 2025-01-13 NOTE — LETTER
[facility]  Feliciano Olson M.D.  [address]  [place] [address]  Fax: [phone]   Authorization for Release/Disclosure of   Protected Health Information   Name: ADRIENNE HAJI : 1955 SSN: [id number]   Address: [address] [place] [address]  [place] [address] Phone:    [phone] (home)    I authorize the entity listed below to release/disclose the PHI below to:   [facility]/Feliciano Olson M.D. and Feliciano Olson M.D.   Provider or Entity Name:  [facility]   Address   City, State, [facility]   Phone:      Fax:  [phone]   Reason for request: continuity of care   Information to be released:    [  ] LAST COLONOSCOPY,  including any PATH REPORT and follow-up  [  ] LAST FIT/COLOGUARD RESULT [  ] LAST DEXA  [  ] LAST MAMMOGRAM  [  ] LAST PAP  [  ] LAST LABS [  ] RETINA EXAM REPORT  [  ] IMMUNIZATION RECORDS  [ x ] Release last 12 months      [  ] Check here and initial the line next to each item to release ALL health information INCLUDING  _____ Care and treatment for drug and / or alcohol abuse  _____ HIV testing, infection status, or AIDS  _____ Genetic Testing    DATES OF SERVICE OR TIME PERIOD TO BE DISCLOSED: _____________  I understand and acknowledge that:  * This Authorization may be revoked at any time by you in writing, except if your health information has already been used or disclosed.  * Your health information that will be used or disclosed as a result of you signing this authorization could be re-disclosed by the recipient. If this occurs, your re-disclosed health information may no longer be protected by State or Federal laws.  * You may refuse to sign this Authorization. Your refusal will not affect your ability to obtain treatment.  * This Authorization becomes effective upon signing and will  on (date) __________.      If no date is indicated, this Authorization will  one (1) year from the signature date.    Name: Adrienne Haji  Signature:continuity of  care Date:   1/15/2025     PLEASE FAX REQUESTED RECORDS BACK TO: [phone]

## 2025-01-13 NOTE — LETTER
[email or web address]  Feliciano Olson M.D.  [address]  [place] [address]  Fax: [phone]   Authorization for Release/Disclosure of   Protected Health Information   Name: ADRIENNE HAJI : 1955 SSN: [id number]   Address: [address] [place] [address]  [place] [address] Phone:    [phone] (home)    I authorize the entity listed below to release/disclose the PHI below to:   [facility]/Feliciano Olson M.D. and Feliciano Olson M.D.   Provider or Entity Name:  [facility]    Address   City, State, [facility]   Phone:      Fax:   [phone]   Reason for request: continuity of care   Information to be released:    [  ] LAST COLONOSCOPY,  including any PATH REPORT and follow-up  [  ] LAST FIT/COLOGUARD RESULT [  ] LAST DEXA  [  ] LAST MAMMOGRAM  [  ] LAST PAP  [  ] LAST LABS [  ] RETINA EXAM REPORT  [  ] IMMUNIZATION RECORDS  [ x ] Release labs done in  and October        [  ] Check here and initial the line next to each item to release ALL health information INCLUDING  _____ Care and treatment for drug and / or alcohol abuse  _____ HIV testing, infection status, or AIDS  _____ Genetic Testing    DATES OF SERVICE OR TIME PERIOD TO BE DISCLOSED: _____________  I understand and acknowledge that:  * This Authorization may be revoked at any time by you in writing, except if your health information has already been used or disclosed.  * Your health information that will be used or disclosed as a result of you signing this authorization could be re-disclosed by the recipient. If this occurs, your re-disclosed health information may no longer be protected by State or Federal laws.  * You may refuse to sign this Authorization. Your refusal will not affect your ability to obtain treatment.  * This Authorization becomes effective upon signing and will  on (date) __________.      If no date is indicated, this Authorization will  one (1) year from the signature date.    Name: Adrienne  Andrew  Signature:continuity of care Date:   1/15/2025     PLEASE FAX REQUESTED RECORDS BACK TO: [phone]

## 2025-01-13 NOTE — LETTER
[facility]  Feliciano Olson M.D.  [address]  [place] [address]  Fax: [phone]   Authorization for Release/Disclosure of   Protected Health Information   Name: ADRIENNE HAJI : 1955 SSN: [id number]   Address: [address] [place] [address]  [place] [address] Phone:    [phone] (home)    I authorize the entity listed below to release/disclose the PHI below to:   [facility]/Feliciano Olson M.D. and Feliciano Olson M.D.   Provider or Entity Name:  [facility]   Address   City, State, Zip   Phone:      Fax:  [phone]   Reason for request: continuity of care   Information to be released:    [  ] LAST COLONOSCOPY,  including any PATH REPORT and follow-up  [  ] LAST FIT/COLOGUARD RESULT [  ] LAST DEXA  [  ] LAST MAMMOGRAM  [  ] LAST PAP  [  ] LAST LABS [  ] RETINA EXAM REPORT  [  ] IMMUNIZATION RECORDS  [ x ] Release ultrasound thyroid done in last 12 months      [  ] Check here and initial the line next to each item to release ALL health information INCLUDING  _____ Care and treatment for drug and / or alcohol abuse  _____ HIV testing, infection status, or AIDS  _____ Genetic Testing    DATES OF SERVICE OR TIME PERIOD TO BE DISCLOSED: _____________  I understand and acknowledge that:  * This Authorization may be revoked at any time by you in writing, except if your health information has already been used or disclosed.  * Your health information that will be used or disclosed as a result of you signing this authorization could be re-disclosed by the recipient. If this occurs, your re-disclosed health information may no longer be protected by State or Federal laws.  * You may refuse to sign this Authorization. Your refusal will not affect your ability to obtain treatment.  * This Authorization becomes effective upon signing and will  on (date) __________.      If no date is indicated, this Authorization will  one (1) year from the signature date.    Name: Adrienne  Andrew  Signature:continuity of care Date:   1/15/2025     PLEASE FAX REQUESTED RECORDS BACK TO: [phone]

## 2025-01-13 NOTE — LETTER
[facility]  Feliciano Olson M.D.  [address]  [place] [address]  Fax: [phone]   Authorization for Release/Disclosure of   Protected Health Information   Name: ADRIENNE HAJI : 1955 SSN: [id number]   Address: [address] [place] [address]  [place] [address] Phone:    [phone] (home)    I authorize the entity listed below to release/disclose the PHI below to:   [employer] [facility]/Feliciano Olson M.D. and Feliciano Olson M.D.   Provider or Entity Name:  [facility] cancer [facility] dermatology   Address   City, [facility]   Phone:      Fax:  [phone]   Reason for request: continuity of care   Information to be released:    [  ] LAST COLONOSCOPY,  including any PATH REPORT and follow-up  [  ] LAST FIT/COLOGUARD RESULT [  ] LAST DEXA  [  ] LAST MAMMOGRAM  [  ] LAST PAP  [  ] LAST LABS [  ] RETINA EXAM REPORT  [  ] IMMUNIZATION RECORDS  [ x ] Release last 12 months      [  ] Check here and initial the line next to each item to release ALL health information INCLUDING  _____ Care and treatment for drug and / or alcohol abuse  _____ HIV testing, infection status, or AIDS  _____ Genetic Testing    DATES OF SERVICE OR TIME PERIOD TO BE DISCLOSED: _____________  I understand and acknowledge that:  * This Authorization may be revoked at any time by you in writing, except if your health information has already been used or disclosed.  * Your health information that will be used or disclosed as a result of you signing this authorization could be re-disclosed by the recipient. If this occurs, your re-disclosed health information may no longer be protected by State or Federal laws.  * You may refuse to sign this Authorization. Your refusal will not affect your ability to obtain treatment.  * This Authorization becomes effective upon signing and will  on (date) __________.      If no date is indicated, this Authorization will  one (1) year from the signature date.    Name: Adrienne Haji  Signature:  continuity of care Date:   1/15/2025     PLEASE FAX REQUESTED RECORDS BACK TO: [phone]

## 2025-01-13 NOTE — TELEPHONE ENCOUNTER
Need old records  (1) skin cancer dermatology [facility] for the last 12 months  (2) [facility] endocrinology for the last 12 months  (3) [facility] gynecology for the last 12 months  (4) labs done at [facility] in June and October  (5) ultrasound thyroid done at [facility] in the last 12 months

## 2025-02-04 ENCOUNTER — TELEPHONE (OUTPATIENT)
Dept: MEDICAL GROUP | Facility: MEDICAL CENTER | Age: 70
End: 2025-02-04
Payer: COMMERCIAL

## 2025-02-04 NOTE — TELEPHONE ENCOUNTER
Notified with CT calcium score done at [facility], score equals 0.  No statin therapy is necessary.  Continue to work on her good nutrition and exercise program.

## 2025-02-14 ENCOUNTER — HOSPITAL ENCOUNTER (OUTPATIENT)
Dept: RADIOLOGY | Facility: MEDICAL CENTER | Age: 70
End: 2025-02-14
Payer: COMMERCIAL

## 2025-02-28 ENCOUNTER — OFFICE VISIT (OUTPATIENT)
Dept: MEDICAL GROUP | Facility: MEDICAL CENTER | Age: 70
End: 2025-02-28
Payer: COMMERCIAL

## 2025-02-28 VITALS
TEMPERATURE: 97.5 F | WEIGHT: 164 LBS | OXYGEN SATURATION: 97 % | BODY MASS INDEX: 26.36 KG/M2 | DIASTOLIC BLOOD PRESSURE: 68 MMHG | HEIGHT: 66 IN | SYSTOLIC BLOOD PRESSURE: 116 MMHG | HEART RATE: 98 BPM

## 2025-02-28 DIAGNOSIS — K76.0 HEPATIC STEATOSIS: ICD-10-CM

## 2025-02-28 DIAGNOSIS — E78.5 DYSLIPIDEMIA: Chronic | ICD-10-CM

## 2025-02-28 DIAGNOSIS — R55 SYNCOPE, UNSPECIFIED SYNCOPE TYPE: ICD-10-CM

## 2025-02-28 DIAGNOSIS — E04.1 THYROID NODULE: ICD-10-CM

## 2025-02-28 PROCEDURE — 3078F DIAST BP <80 MM HG: CPT | Performed by: INTERNAL MEDICINE

## 2025-02-28 PROCEDURE — 99214 OFFICE O/P EST MOD 30 MIN: CPT | Performed by: INTERNAL MEDICINE

## 2025-02-28 PROCEDURE — 3074F SYST BP LT 130 MM HG: CPT | Performed by: INTERNAL MEDICINE

## 2025-02-28 RX ORDER — ESTRADIOL 0.04 MG/D
PATCH, EXTENDED RELEASE TRANSDERMAL
COMMUNITY

## 2025-02-28 RX ORDER — FLUOCINONIDE 0.5 MG/G
OINTMENT TOPICAL
COMMUNITY
Start: 2023-10-13

## 2025-02-28 RX ORDER — RUXOLITINIB 15 MG/G
CREAM TOPICAL
COMMUNITY
Start: 2025-02-10

## 2025-02-28 ASSESSMENT — FIBROSIS 4 INDEX: FIB4 SCORE: 1.7

## 2025-02-28 NOTE — PROGRESS NOTES
Subjective     Adrienne Morales is a 69 y.o. female who presents with Follow-Up            HPI      Feb 13th in the am around 630 in the am did not sleep well night before and  noticed patient head slumped back and not sure if eyes closed   Head went back and she started snoring,   Tried to voice wake her and shake her did not wake up   Did not lose position, no seizure  Did not lose control bowel or bladder  Unclear time frame 3 minutes   Did not stop breathing  Then patient woke up not confused upon awakeing   No headache  No slurring words  Patient did notice that she felt flushed over her body right before this episode but no headache, no history of complicated migraine  Did not recall passing out but not confused on awakeing  No vision changes   No tunnel vision   No funny smell or taste   Patien had appropriate resopnes to   No decrease strenth of arms or legs   Got up to walk to the bedroom, no balance or hand eye coordination issues  Patient had been talking to    Night before no sleep meds or vitamins or suppleemnt  No etoh the night before  Just had coffee that am  No etoh two wees prior   No chest pain or palpiatiiona  No uri symptoms  No nausea or diarrhea, no emesis no recent upper respiratory tract infection, sinus congestion, sore throat, fever  No head trauma or history of concussion  Has fit bit and heart rate 110-120 at 645   No chest pain, no palpitations no history of arrhythmia,      Current Outpatient Medications   Medication Sig Dispense Refill    cetirizine (ZYRTEC) 10 MG Tab Take 10 mg by mouth every day.      Omega-3 Fatty Acids (FISH OIL PO) Take 1,280 Int'l Units/day by mouth.      Calcium Carbonate Antacid 1000 MG Chew Tab Chew.      Propylene Glycol (SYSTANE BALANCE OP) Administer  into affected eye(s).      estradiol (CLIMARA) 0.0375 MG/24HR patch Place 1 Patch on the skin every 7 days. 4 Patch 0    acyclovir (ZOVIRAX) 5 % Ointment APPLY TO THE AFFECTED AREA  EVERY 2 HOURS AS DIRECTED      Multiple Vitamins-Minerals (PRESERVISION AREDS 2 PO) PreserVision AREDS 2      sumatriptan (IMITREX) 100 MG tablet Take 1 Tablet by mouth one time as needed for Migraine. TAKE 1 TABLET BY MOUTH ONCE AS NEEDED FOR MIGRAINE FOR UP TO 1 DOSE 10 Tablet 6    tacrolimus (PROTOPIC) 0.1 % Ointment       Cholecalciferol (VITAMIN D) 2000 UNIT Tab Take  by mouth every day.      Naproxen Sodium (ALEVE PO) Take  by mouth.       No current facility-administered medications for this visit.                thyroid nodule  2/12/22 ultrasound soft tissue neck at [facility] left upper pole nodule 0.8 x 0.7 x 0.6 cm TIRADS 2/12/22 ultrasound soft tissue neck at [facility] left upper pole nodule 0.8 x 0.7 x 0.6 cm TIRADS 5 suspicious   4/26/22  endocrine note labs ordered, repeat ultrasound thyroid in 6 months to monitor size and progression  9/27/22 tsh 2.2,free t4 1.23,free t3 3.1,TPO 11 per  endocrine  9/28/22 ultrasound thyroid done at [facility] per  endocrinology left upper pole nodule 0.8 x 0.8 x 0.6 cm tirad 7, no change compared to previous  10/3/22 tsh 2.2,free t4 1.2,free t3 3.1,TPO 11 (0-34) per  endocrine  10/7/22  endocrine note repeat thyroid ultrasound one year  7/21/23 tsh 1.4  10/10/23 bea at [facility] endocrinology note thyroid labs ordered and ultrasound thyroid  11/21/23 [facility] note repeat thyroid ultrasound looks good, recheck 1 year   2/7/24 tsh 1.8,free t4 1.2,free t3 3.5   2/29/24 [facility] note repeat ultrasound thyroid 1 year, start estradiol patch 0.0375 mg/24 hours every 3 days  6/24/24 [facility] note repeat us thyroid ordered for october  10/22/24 [facility] note     Sleep disordered breathing  12/12/19 OPO through preferred see if perhaps she has sleep apnea contributing to migraine headaches  1/6/20 OPO through preferred time less than 88% 23 minutes, low saturation 76%, basal saturation 92%     skin lesion  [facility]22   [facility]   [facility]22  [facility]  1/26/23 teressa [facility]yer skin cancer dermatology note hand dermatitis start tacrolimus 0.1% ointment bid  9/11/23 teress[facility] beyer dermatology note   11/27/23 teressa beyer dermatology note dermatitis back start triamcinolone 0.1% cream  bid, zyrtec  1/17/24 teressa beyer skin cancer dermatology note dermatitis back start triamcinolone 0.1% cream bid, zyrtec   9/11/24 teressa beyer np skin cancer dermatology note      Preventative health  9/12/14 tdap  12/31/19 hep c ab negative  1/15/20 cologuard negative  6/22/23 dr.marlow matos gynecology note   1/9/24 dexa at [facility] LS-0.3,hip-2.0  2/2/24 colon per [facility] moderate diverticulosis, single adenoma repeat 7 years  5/21/24 shingrix second  6/18/24 vit d 40 done at [facility] per [facility]  11/26/24 mammogram at [facility]   1/13/25 declines flu,tdap, prevnar  1/15/25  [facility] gyn note      post menopausal  11/21/23 [facility] note start estradiol patch 0.025 mg weekly every 3 days and testosterone powder 5 mg/gm apply 0.5 gm topical daily  2/7/24 testosterone 41,SHBG 35,LH 28,FSH 46,estradiol 28  2/29/24 [facility] note repeat ultrasound thyroid 1 year, start estradiol patch 0.0375 mg/24 hours every 3 days  6/24/24 [facility] note start testosterone powder 5 mg/gm apply 0.5 gm daily and continue estradiol patch 0.0375 mg/24 hours twice weekly  10/3/24 LH 17,FSH 30,estradiol 42.7,SBHG 34.7 per [facility] start estradiol patch 0.375 mg twice weekly  10/22/24 [facility] note  1/15/25  [facility] gyn note recommend not use estradiol since only using for hair loss     Osteopenia  12/10/12 dexa LS -0.9, hip -1.2 per gyn  9/16/14 vit d 28 start 2000 units  10/6/15 dexa LS 0.0,hip -1.8 ;FRAX 9% major,1.0% hip  10/14/15 vit d 32   7/22/16 start 2000 units   10/19/16 vit d 35 on 2000 units done at [facility]  10/20/17 vit d 37 done at [facility]  11/10/17 dexa LS-0.5,hip-2.0;FRAX 10%  major,1.5% hip done at [facility]  11/27/18 vit d 42  12/31/19 dexa at [facility] LS-0.5,hip-1.9 done at [facility],FRAX 10.8% major,2.5% hip  1/11/20 vit d 43  1/6/21 vit d 44  1/5/22 dexa at [facility] LS-0.4,hip-1.8  1/13/22 vit d 44  9/27/22 vit d 44 per  endocrine  7/21/23 PTH 42,celiac panel negative  1/9/24 dexa at [facility] LS-0.3,hip-2.0  6/18/24 vit d 40 done at [facility] per [facility]     low back pain   12/20/24 [facility] note low back pain   12/20/24 x-ray lumbar spine flexion-extension views, questionable chronic changes mid thoracic spine T8-T10, no acute fractures     Insomnia uses Ambien for travel     Impaired glucose metabolism  12/31/19 A1c 5.8%  1/6/21 A1c 5.6%  1/13/22 A1c 5.7%  3/8/23 A1c 5.9%  8/18/24 A1c 5.6%     History of perforated tympanic membrane     History migraine tried Inderal previously but caused low blood pressure  Tried inderal in her 20s, but caused low bp  10/24/17 more migraine recently and declines medication   12/11/18 uses imitrex as needed  12/14/20 on imitrex as needed     History endometrial cancer  3/10 JACKLYN [facility]SO for stage I endometrial cancer   2013 gyn exam  sees twice per year  9/14 sees  gyn  3/7/17 pap per gyn   3/18 saw  gyn   6/22/23 dr.marlow matos gynecology note   11/21/23 [facility] note start estradiol patch 0.025 mg weekly every 3 days and testosterone powder 5 mg/gm apply 0.5 gm topical daily     history covid  1/17/22 patient MyChart message positive home test, will order PCR  1/18/22 covid positive rapid test     Family history cardiovascular disease  Father CABG and sister heart disease  11/20/13 echo normal LV 60%  12/10/13 CT calcium score 0     Dyslipidemia  11/20/13 echo normal LV 60%  11/14/13 chol 191,trig 113,hdl 61,ldl 107  12/10/13 CT calcium score 0  9/16/14 chol 206,trig 91,hdl 74,ldl 114  10/14/15 chol 215,trig 83,hdl 81,ldl 117  10/19/16 chol 189,trig 127,hdl 61,ldl 103 done at [facility]  10/24/17 chol 205,trig 83,hdl 66,ldl  121  11/27/18 chol 204,trig 121,hdl 62,ldl 121  12/31/19 chol 204,trig 130,hdl 65,ldl 113  1/6/21 chol 206,trig 154,hdl 61,ldl 118; 10 year risk 5.75%  1/13/22 chol 213,trig 158,hdl 57,ldl 128; 10 year risk 4.5%  3/8/23 chol 218,trig 110,hdl 67,ldl 132; 10 year risk 4.9%  8/18/24 chol 202,trig 125,hdl 58,ldl 122; 10 year risk 5.7%  1/29/25 CT calcium score = 0.0, incidental 2 mm benign calcified granuloma anterior medial segment left lower lobe done at [facility]     adenoma  2/2/24 colon per [facility] moderate diverticulosis, single adenoma repeat 7 years     abn liver enzymes  9/15/14 AST 22,ALT 35,alk phos 118,bili 0.3  11/27/18 AST 42,ALT 50,alk phos 121,bili 0.3  12/31/19 AST 56,ALT 30,alk phos 122,bili 0.4  1/8/20 ultrasound abdomen done at [facility], diffusely echogenic liver likely representing hepatic steatosis, no focal mass, common bile duct within normal limits 0.3 cm, pancreas unremarkable, gallbladder and spleen unremarkable  1/11/20 alk phos 137, acute hepatitis panel negative, iron 108,%sat 30, ferritin 75, SPEP negative, AMA negative, smooth muscle antibody negative  5/22/20 AST 35,ALT 40,GGT 69, alkaline phosphatase 115, liver fraction 58% (18-85%), bone fractions 42% (14-68%), bilirubin 0.2  1/6/21 alk phos 142,AST 39,ALT 73,bili 0.3  4/26/21 AST 66, ALT 89, GGT 95, fibrosis score 0.23 (stage F0-F1), steatosis score 0.65 (S2 moderate steatosis), BANKS 0.25 (N0)  1/13/22 alk phos 133(),bili 0.3,AST 24,ALT 40,GGT 70   3/8/23 alk phos 171,AST 35,ALT 62,bili 0.4  5/19/23 ultrasound right upper quadrant done at [facility], liver diffusely echogenic likely representing hepatic steatosis, no focal mass, normal portal vein blood flow, gallbladder negative, common bile duct 0.2 cm, pancreas unremarkable, normal spleen size  7/21/23 alk phos 123 (),liver fraction 61% (18-85%), bone fraction 39% (14-68%),AMA<20,AST 21,ALT 37,bili 0.5,,celiac panel negative,PTH 42,tsh 1.4  8/18/24 AST 34,ALT 44,alk phos 96,bili 0.4    "  Patient Active Problem List   Diagnosis    Preventative health care    Insomnia    History of endometrial cancer    History of migraine    Osteopenia    History of perforated tympanic membrane    Family history of cardiovascular disease    Dyslipidemia    Abnormal liver function    Dermatochalasis    Impaired glucose metabolism    Sleep disorder breathing    History of shingles    History of COVID-19    Thyroid nodule    Skin lesion    Post-menopausal    Patient has active power of  for health care    Adenoma of colon    Low back pain          Fall Risk Assessment  Has the patient had two or more falls in the last year or any fall with injury in the last year?  No           Patient Care Team:  Feliciano Olson M.D. as PCP - General (Internal Medicine)        ROS           Objective     /68   Pulse 98   Temp 36.4 °C (97.5 °F) (Temporal)   Ht 1.676 m (5' 6\")   Wt 74.4 kg (164 lb)   SpO2 97%   BMI 26.47 kg/m²      Physical Exam                             Assessment & Plan  Syncope, unspecified syncope type    Orders:    EKG - Clinic Performed           Off hrt    Old records derm    Vaccines  tdap     [facility] endocrine    Echo     EKG    MRA head and neck    MRI brain    EEG    Zio 30-day event monitor  " "    /68   Pulse 98   Temp 36.4 °C (97.5 °F) (Temporal)   Ht 1.676 m (5' 6\")   Wt 74.4 kg (164 lb)   SpO2 97%   BMI 26.47 kg/m²      Physical Exam  Vitals and nursing note reviewed.   Constitutional:       General: She is not in acute distress.     Appearance: Normal appearance. She is not ill-appearing.   HENT:      Head: Normocephalic and atraumatic.      Right Ear: Tympanic membrane and external ear normal.      Left Ear: Tympanic membrane and external ear normal.      Nose: Nose normal.      Mouth/Throat:      Pharynx: No oropharyngeal exudate.   Eyes:      Conjunctiva/sclera: Conjunctivae normal.   Cardiovascular:      Rate and Rhythm: Normal rate and regular rhythm.      Heart sounds: Normal heart sounds. No murmur heard.  Pulmonary:      Effort: No respiratory distress.      Breath sounds: Normal breath sounds. No wheezing.   Abdominal:      General: There is no distension.   Musculoskeletal:         General: No swelling.      Cervical back: Neck supple. No rigidity.   Skin:     Coloration: Skin is not jaundiced.      Findings: No bruising.   Neurological:      General: No focal deficit present.      Mental Status: She is alert.      Cranial Nerves: No cranial nerve deficit.      Motor: No weakness.      Coordination: Coordination normal.      Gait: Gait normal.   Psychiatric:         Mood and Affect: Mood normal.         Behavior: Behavior normal.         Thought Content: Thought content normal.         Judgment: Judgment normal.          Cranial nerves intact  No nystagmus  Negative Romberg  Normal strength upper and lower extremities  No ataxia  Normal insight, judgment                 Assessment & Plan       Assessment  #1 episode of loss of consciousness, patient was having coffee with her  February 13 and her head went back and she started to snore and was not arousable although did not stop breathing, lasting 2 to 3 minutes and then resolved, no generalized seizure activity patient " did not have any prodromal symptoms, no history of seizures, no history of cardiac arrhythmia, no history of TIA or stroke, no history of complicated migraine, upon awakening no focal deficits, no difficulty with speech or swallowing, symptoms resolved and no further episodes have occurred, no focal deficits on exam today    #2 dyslipidemia 8/18/24 chol 202,trig 125,hdl 58,ldl 122; 10 year risk 5.7%, CT calcium score in January 0, based on the cholesterol panel and this years CT calcium score, 10-year Marie risk 1.4%    #3 hepatic steatosis by ultrasound and FibroSure testing, normal AST and ALT    #4 thyroid nodule followed by endocrinology, no thyroid medication    Plan  #1 EKG today sinus    #2 evaluation for syncope    #3 echo ordered    #4 event monitor, 30-day event monitor given episodes without any clear history of arrhythmia, I believe this is reasonable given the fact that it is not clear what may have precipitated her symptoms, despite negative EKG cardiac arrhythmia would be the most likely cause    #5 MRI brain without    #6 MRA without head and neck    #7 EEG    #8 referral to neurology     #9 notify us if similar symptoms happen again and check blood pressure and heart rate at that time    #10 recommend no alcohol

## 2025-03-05 NOTE — Clinical Note
REFERRAL APPROVAL NOTICE         Sent on March 5, 2025                   Adrienne Morales  [address]  [place] [address]                   Dear Ms. Morales,    After a careful review of the medical information and benefit coverage, [facility] has processed your referral. See below for additional details.    If applicable, you must be actively enrolled with your insurance for coverage of the authorized service. If you have any questions regarding your coverage, please contact your insurance directly.    REFERRAL INFORMATION   Referral #:  [id number]  Referred-To Department    Referred-By Provider:  Neurology    Feliciano NELSON Olson M.D.   Neurology [facility]      [address]   [address]  [place] [address] [phone] [address] [facility], [address]  [place] [id number] [phone]    Referral Start Date:  03/01/2025  Referral End Date:   03/01/2026           SCHEDULING  If you do not already have an appointment, please call [phone] to make an appointment.   MORE INFORMATION  As a reminder, [facility] ownership has changed, meaning this location is now owned and operated by [facility]. As such, we want to clarify that our patients should expect to receive two separate bills for the services received at [facility] - one representing the [facility] facility fees as the owner of the establishment, and the other to represent the physician's services and subsequent fees. You can speak with your insurance carrier for a pricing estimate by calling the customer service number on the back of your card and ask about charges for a hospital outpatient visit.  If you do not already have a [employer] account, sign up at: [email or web address]  You can access your medical information, make appointments, see lab results, billing information, and more.  If you have questions regarding this referral, please contact  the [facility] Referrals department at:             [phone]. Monday -  Friday 7:30AM - 5:00PM.      Sincerely,  [facility]

## 2025-03-07 ENCOUNTER — TELEPHONE (OUTPATIENT)
Dept: MEDICAL GROUP | Facility: MEDICAL CENTER | Age: 70
End: 2025-03-07

## 2025-03-07 NOTE — TELEPHONE ENCOUNTER
Please check with cardiology scheduling, I submitted a request for a 30-day event monitor on February 28, the patient has not been contacted yet, please see if cardiology received that referral.

## 2025-03-25 ENCOUNTER — APPOINTMENT (OUTPATIENT)
Dept: CARDIOLOGY | Facility: MEDICAL CENTER | Age: 70
End: 2025-03-25
Attending: INTERNAL MEDICINE
Payer: COMMERCIAL

## 2025-04-02 ENCOUNTER — NON-PROVIDER VISIT (OUTPATIENT)
Dept: CARDIOLOGY | Facility: MEDICAL CENTER | Age: 70
End: 2025-04-02
Attending: INTERNAL MEDICINE
Payer: COMMERCIAL

## 2025-04-02 ENCOUNTER — TELEPHONE (OUTPATIENT)
Dept: MEDICAL GROUP | Facility: MEDICAL CENTER | Age: 70
End: 2025-04-02
Payer: COMMERCIAL

## 2025-04-02 DIAGNOSIS — R55 SYNCOPE, UNSPECIFIED SYNCOPE TYPE: ICD-10-CM

## 2025-04-02 PROCEDURE — 93270 REMOTE 30 DAY ECG REV/REPORT: CPT | Performed by: INTERNAL MEDICINE

## 2025-04-02 NOTE — PROGRESS NOTES
Placed a 30 day MCOT by Feliciano Olson MD.  Baseline Transmitted and Pending EOS.  Serial#: [id number]

## 2025-04-07 ENCOUNTER — TELEPHONE (OUTPATIENT)
Dept: MEDICAL GROUP | Facility: MEDICAL CENTER | Age: 70
End: 2025-04-07
Payer: COMMERCIAL

## 2025-04-08 NOTE — TELEPHONE ENCOUNTER
Could you please call radiology scheduling, I submitted a referral for the EEG on March 5th  to neurodiagnostics, there is a note indicating to please process that referral request but the patient has not been contacted and I do not even see that this has been approved.    Please call imaging scheduling and have them work on this as soon as possible since the patient is awaiting this test, she sees a neurologist within a month.

## 2025-04-28 ENCOUNTER — TELEPHONE (OUTPATIENT)
Dept: URGENT CARE | Facility: CLINIC | Age: 70
End: 2025-04-28
Payer: COMMERCIAL

## 2025-04-28 NOTE — TELEPHONE ENCOUNTER
Contacted [facility] they will send over labs that  were drawn on 04/23            Dr Olson,  You [facility] send [facility] results to you.  I had labs drawn on 4/23/25.  Thanks,  Adrienne

## 2025-05-02 ENCOUNTER — NON-PROVIDER VISIT (OUTPATIENT)
Dept: NEUROLOGY | Facility: MEDICAL CENTER | Age: 70
End: 2025-05-02
Attending: STUDENT IN AN ORGANIZED HEALTH CARE EDUCATION/TRAINING PROGRAM
Payer: COMMERCIAL

## 2025-05-02 DIAGNOSIS — R55 SYNCOPE, UNSPECIFIED SYNCOPE TYPE: ICD-10-CM

## 2025-05-02 PROCEDURE — 95816 EEG AWAKE AND DROWSY: CPT | Performed by: STUDENT IN AN ORGANIZED HEALTH CARE EDUCATION/TRAINING PROGRAM

## 2025-05-02 PROCEDURE — 95816 EEG AWAKE AND DROWSY: CPT | Mod: 26 | Performed by: STUDENT IN AN ORGANIZED HEALTH CARE EDUCATION/TRAINING PROGRAM

## 2025-05-02 NOTE — PROCEDURES
OUTPATIENT ROUTINE VIDEO ELECTROENCEPHALOGRAM REPORT    REFERRING PROVIDER: Feliciano Olson M.D.  [address]   [address]  [place] [id number]  DOS: 05/02/2025    STUDY DURATION: 0 hours and 25 minutes of total recording time.     INDICATION:  Adrienne Morales 70 y.o. female presenting with  syncopal event.     RELEVANT MEDICATIONS/TREATMENTS:   Current Outpatient Medications   Medication Instructions    acyclovir (ZOVIRAX) 5 % Ointment APPLY TO THE AFFECTED AREA EVERY 2 HOURS AS DIRECTED    Calcium Carbonate Antacid 1000 MG Chew Tab Chew.    cetirizine (ZYRTEC) 20 mg, 2 TIMES DAILY    Cholecalciferol (VITAMIN D) 2000 UNIT Tab DAILY    fluocinonide (LIDEX) 0.05 % Ointment     Multiple Vitamins-Minerals (PRESERVISION AREDS 2 PO) PreserVision AREDS 2    Naproxen Sodium (ALEVE PO) Take  by mouth.    OPZELURA 1.5 % Cream     Propylene Glycol (SYSTANE BALANCE OP) Administer  into affected eye(s).    tacrolimus (PROTOPIC) 0.1 % Ointment No dose, route, or frequency recorded.        TECHNIQUE:   Routine VEEG was set up by a Neurodiagnostic technologist who performed education to the patient and staff. A minimum of 23 electrodes and 23 channel recording was setup and performed by Neurodiagnostic technologist, in accordance with the international 10-20 system. The study was reviewed in bipolar and referential montages. The recording examined the patient in the  awake state(s).     DESCRIPTION OF THE RECORD:  During wakefulness, the background was continuous and showed a 11 Hz posterior dominant rhythm.  Excess diffuse beta was present. There was reactivity to eye closure/opening.  An anterior-posterior gradient was noted with faster beta frequencies seen anteriorly.  During drowsiness, theta/delta frequencies were seen.    EEG Sleep: N2 sleep architecture was not seen.    ICTAL AND INTERICTAL FINDINGS:   1) There was a single left temporal sharp wave at F7/T3, possibly a benign variant  2) Occasional bursts of  bilateral occipital theta slowing, approximately 6 Hz.     Single sharp wave at F7/T3, sensitivity 7 uV/mm      ACTIVATION PROCEDURES:   Hyperventilation was performed by the patient for a total of 3 minutes. The technician performing the test noted good effort. This technique did not elicited any abnormalities on EEG.  and Intermittent Photic stimulation was performed in a stepwise fashion from 1 to 30 Hz and did not elicited any abnormalities on EEG.     EKG: Single lead EKG was not interpretable due to artifact.     EVENTS:  None    INTERPRETATION:   Abnormal video EEG recording in the awake state(s):  1) There was a single sharp wave, left temporal region, possibly a benign variant. In the absence of additional similar findings on this routine study, benign variant is favored. Additional long term EEG monitoring can be pursued if there is clinical concern from epilepsy.   2) Occasional bursts of bilateral occipital theta slowing, approximately 6 Hz. This finding suggests focal cerebral dysfunction of the bilateral occipital region and may be seen in the setting of a structural abnormality, area of prior trauma, postictally, or other considerations. Clinical correlation recommended.       Note: The abnormal findings on this EEG are not diagnostic of epilepsy, as epilepsy remains a clinical diagnosis.  If the clinical suspicion remains high for seizures, a prolonged video EEG recording to capture clinical or subclinical events may be helpful.    Yessi Mccall MD  Department of Neurology at [facility]  General Neurologist and Epileptologist   of Clinical Neurology, [facility].

## 2025-05-03 ENCOUNTER — HOSPITAL ENCOUNTER (OUTPATIENT)
Dept: RADIOLOGY | Facility: MEDICAL CENTER | Age: 70
End: 2025-05-03
Attending: INTERNAL MEDICINE
Payer: COMMERCIAL

## 2025-05-03 DIAGNOSIS — R55 SYNCOPE, UNSPECIFIED SYNCOPE TYPE: ICD-10-CM

## 2025-05-03 PROCEDURE — 70551 MRI BRAIN STEM W/O DYE: CPT

## 2025-05-03 PROCEDURE — 70547 MR ANGIOGRAPHY NECK W/O DYE: CPT

## 2025-05-03 PROCEDURE — 70544 MR ANGIOGRAPHY HEAD W/O DYE: CPT | Mod: XU

## 2025-05-05 ENCOUNTER — TELEPHONE (OUTPATIENT)
Dept: CARDIOLOGY | Facility: MEDICAL CENTER | Age: 70
End: 2025-05-05

## 2025-05-05 ENCOUNTER — RESULTS FOLLOW-UP (OUTPATIENT)
Dept: MEDICAL GROUP | Facility: MEDICAL CENTER | Age: 70
End: 2025-05-05

## 2025-05-05 ENCOUNTER — HOSPITAL ENCOUNTER (OUTPATIENT)
Dept: CARDIOLOGY | Facility: MEDICAL CENTER | Age: 70
End: 2025-05-05
Attending: INTERNAL MEDICINE
Payer: COMMERCIAL

## 2025-05-05 ENCOUNTER — RESULTS FOLLOW-UP (OUTPATIENT)
Dept: MEDICAL GROUP | Facility: MEDICAL CENTER | Age: 70
End: 2025-05-05
Payer: COMMERCIAL

## 2025-05-05 DIAGNOSIS — R55 SYNCOPE, UNSPECIFIED SYNCOPE TYPE: ICD-10-CM

## 2025-05-05 LAB
LV EJECT FRACT  99904: 60
LV EJECT FRACT MOD 2C 99903: 63.36
LV EJECT FRACT MOD 4C 99902: 58.8
LV EJECT FRACT MOD BP 99901: 62.34

## 2025-05-05 PROCEDURE — 93306 TTE W/DOPPLER COMPLETE: CPT | Mod: 26 | Performed by: INTERNAL MEDICINE

## 2025-05-05 PROCEDURE — 93306 TTE W/DOPPLER COMPLETE: CPT

## 2025-05-07 ENCOUNTER — RESULTS FOLLOW-UP (OUTPATIENT)
Dept: MEDICAL GROUP | Facility: MEDICAL CENTER | Age: 70
End: 2025-05-07

## 2025-05-09 ENCOUNTER — OFFICE VISIT (OUTPATIENT)
Dept: NEUROLOGY | Facility: MEDICAL CENTER | Age: 70
End: 2025-05-09
Attending: STUDENT IN AN ORGANIZED HEALTH CARE EDUCATION/TRAINING PROGRAM
Payer: COMMERCIAL

## 2025-05-09 VITALS
SYSTOLIC BLOOD PRESSURE: 98 MMHG | BODY MASS INDEX: 27.36 KG/M2 | DIASTOLIC BLOOD PRESSURE: 60 MMHG | OXYGEN SATURATION: 97 % | WEIGHT: 164.24 LBS | HEIGHT: 65 IN | RESPIRATION RATE: 20 BRPM | HEART RATE: 80 BPM

## 2025-05-09 DIAGNOSIS — R55 SYNCOPE, UNSPECIFIED SYNCOPE TYPE: ICD-10-CM

## 2025-05-09 DIAGNOSIS — R94.01 ABNORMAL EEG: ICD-10-CM

## 2025-05-09 PROCEDURE — 99204 OFFICE O/P NEW MOD 45 MIN: CPT | Performed by: STUDENT IN AN ORGANIZED HEALTH CARE EDUCATION/TRAINING PROGRAM

## 2025-05-09 PROCEDURE — 3074F SYST BP LT 130 MM HG: CPT | Performed by: STUDENT IN AN ORGANIZED HEALTH CARE EDUCATION/TRAINING PROGRAM

## 2025-05-09 PROCEDURE — 3078F DIAST BP <80 MM HG: CPT | Performed by: STUDENT IN AN ORGANIZED HEALTH CARE EDUCATION/TRAINING PROGRAM

## 2025-05-09 PROCEDURE — 99212 OFFICE O/P EST SF 10 MIN: CPT | Performed by: STUDENT IN AN ORGANIZED HEALTH CARE EDUCATION/TRAINING PROGRAM

## 2025-05-09 RX ORDER — CETIRIZINE HYDROCHLORIDE 10 MG/1
20 TABLET ORAL 2 TIMES DAILY
COMMUNITY

## 2025-05-09 ASSESSMENT — FIBROSIS 4 INDEX: FIB4 SCORE: 1.72

## 2025-05-09 ASSESSMENT — PATIENT HEALTH QUESTIONNAIRE - PHQ9: CLINICAL INTERPRETATION OF PHQ2 SCORE: 0

## 2025-05-09 NOTE — PROGRESS NOTES
[facility]    Patient name: Adrienne Morales  YOB: 1955  MRN: [id number]  Referring provider: Feliciano Olson M.D.  [address]   [address]  [place]  [place] [id number]   Date of visit: 5/9/2025     CC: Seizure      HPI:    Adrienne Morales is a 70 y.o. woman with a history of endometrial cancer s/p JACKLYN/BSO in 2010, hyperlipidemia, longstanding migraines that stopped in 2023 who is referred for an initial neurologic consultation for seizures.    The patient is accompanied to clinic by her  who supplements the history.     A single lifetime event of concern occurred on 2/13/2025.  She had been seated at the table having coffee with her  then suddenly her head fell backward and she stopped responding, eyes open. No extremity jerking, and she diffuse loss of tone essentially. She was wearing her Fitbit and it showed a spike in heart rate to 122 (she has a photo of this on her phone).  The duration of the event is unclear,  estimates it might have been approximately a minute.    She usually doesn't eat breakfast and had not that morning.  There were no precipitating factors for this event that she can identify. No new stressors, sleep deprivation, dietary changes.    She regained awareness and was immediately back to baseline.     As a child and adult, if standing for long periods of time she would experience tunnel vision/dizziness.     She had not had a drink of alcohol in 2 weeks because she had hives of unclear cause and she decided to cut out alcohol.     She has not had any recurrence of a similar episode since then.    She underwent an extensive cardiac workup with a normal echocardiogram and unremarkable cardiac monitoring for 28 days.    She also had an MRI of the brain and MRA of the head and neck which were unremarkable.  She had an MRI in 2008 which she has brought a hard copy of the films from, recalls being told that there was a cyst on the study  however this was not seen on repeat imaging this month.    Mood: good, no issues      5/9/2025     9:00 AM 1/13/2025     3:00 PM 7/15/2024     2:00 PM 8/17/2023     4:20 PM 2/10/2022     1:00 PM   PHQ-9 Screening   Little interest or pleasure in doing things 0 - not at all 0 - not at all 0 - not at all 0 - not at all 0 - not at all   Feeling down, depressed, or hopeless 0 - not at all 0 - not at all 0 - not at all 0 - not at all 0 - not at all   PHQ-2 Total Score 0 0 0 0 0       Side effects: n/a      Driving: yes    Vitamin D: Taking for osteopenia    Risk factors for epileptic seizures:  Normal birth history, no complications, born at term.   (+) history of significant head trauma - in 4th grade she played a game where kids would hyperventilate, then she passed out and woke up on the floor of the gymnasium at school lying on her back.  No history of stroke or meningitis.  No family history of epilepsy.   No febrile seizures.     Family history of vascular disease, mother had atrial fibrillation.    Social history: no tobacco, only social alcohol use, no recreational drugs    Employment: Retired    She stopped having migraines about 2 years ago. Prior to that she would have migraines up to 15 times per month. She took imitrex as an abortive.     Past Medical History:   Past Medical History:   Diagnosis Date    Endometrial cancer (HCC) 03/10/2010    JACKLYN/BSO - ADENOcarcinoma, endometrioid type, Grade I    Diverticulosis of colon     Hemorrhoids     internal and external seen on colonscopy 2008    HSV-1 infection     Insomnia     Migraine     Osteopenia     Rotator cuff dysfunction     Thyroid disease     Vitamin D deficiency        Past Surgical History:   Past Surgical History:   Procedure Laterality Date    ABDOMINAL HYSTERECTOMY TOTAL  03/09/2010    endometrial cancer    EYE SURGERY         Social History:   Social History     Socioeconomic History    Marital status:      Spouse name: Not on file    Number  of children: Not on file    Years of education: Not on file    Highest education level: Associate degree: occupational, technical, or vocational program   Occupational History    Not on file   Tobacco Use    Smoking status: Never    Smokeless tobacco: Never   Vaping Use    Vaping status: Never Used   Substance and Sexual Activity    Alcohol use: Yes     Alcohol/week: 0.0 oz     Comment: occasional    Drug use: Never    Sexual activity: Not Currently   Other Topics Concern    Not on file   Social History Narrative    Not on file     Social Drivers of Health     Financial Resource Strain: Low Risk  (1/13/2025)    Overall Financial Resource Strain (CARDIA)     Difficulty of Paying Living Expenses: Not hard at all   Food Insecurity: No Food Insecurity (1/13/2025)    Hunger Vital Sign     Worried About Running Out of Food in the Last Year: Never true     Ran Out of Food in the Last Year: Never true   Transportation Needs: No Transportation Needs (1/13/2025)    PRAPARE - Transportation     Lack of Transportation (Medical): No     Lack of Transportation (Non-Medical): No   Physical Activity: Insufficiently Active (1/13/2025)    Exercise Vital Sign     Days of Exercise per Week: 4 days     Minutes of Exercise per Session: 30 min   Stress: No Stress Concern Present (1/13/2025)    [employer] [place] [employer] - Occupational Stress Questionnaire     Feeling of Stress : Only a little   Social Connections: Socially Integrated (1/13/2025)    Social Connection and Isolation Panel [NHANES]     Frequency of Communication with Friends and Family: More than three times a week     Frequency of Social Gatherings with Friends and Family: Three times a week     Attends Orthodox Services: More than 4 times per year     Active Member of Clubs or Organizations: Yes     Attends Club or Organization Meetings: More than 4 times per year     Marital Status:    Intimate Partner Violence: Not on file   Housing Stability: Low  Risk  (1/13/2025)    Housing Stability Vital Sign     Unable to Pay for Housing in the Last Year: No     Number of Times Moved in the Last Year: 0     Homeless in the Last Year: No       Family Hx:   Family History   Problem Relation Age of Onset    Heart Disease Mother     Hypertension Mother     Alcohol/Drug Mother     GI Disease Mother         colon polyps    Arthritis Mother     Hypertension Father     Alcohol/Drug Father     Heart Disease Father         cabg,valve replaced,carotid    Diabetes Father     GI Disease Father         colon polyps    Psychiatric Illness Sister     Heart Disease Sister         MI    Cancer Paternal Uncle     Cancer Brother         thyroid    Diabetes Maternal Grandmother     Cancer Other         maternal cousin with breast ca       Current Medications:   Current Outpatient Medications:     cetirizine (ZYRTEC) 10 MG Tab, Take 20 mg by mouth 2 times a day., Disp: , Rfl:     fluocinonide (LIDEX) 0.05 % Ointment, , Disp: , Rfl:     OPZELURA 1.5 % Cream, , Disp: , Rfl:     Calcium Carbonate Antacid 1000 MG Chew Tab, Chew., Disp: , Rfl:     Propylene Glycol (SYSTANE BALANCE OP), Administer  into affected eye(s)., Disp: , Rfl:     acyclovir (ZOVIRAX) 5 % Ointment, APPLY TO THE AFFECTED AREA EVERY 2 HOURS AS DIRECTED, Disp: , Rfl:     Multiple Vitamins-Minerals (PRESERVISION AREDS 2 PO), PreserVision AREDS 2, Disp: , Rfl:     tacrolimus (PROTOPIC) 0.1 % Ointment, , Disp: , Rfl:     Cholecalciferol (VITAMIN D) 2000 UNIT Tab, Take  by mouth every day., Disp: , Rfl:     Naproxen Sodium (ALEVE PO), Take  by mouth., Disp: , Rfl:     ZURDO 0.0375 MG/24HR patch, APPLY 1 PATCH TOPICALLY TWICE A WEEK, Disp: , Rfl:     Omega-3 Fatty Acids (FISH OIL PO), Take 1,280 Int'l Units/day by mouth., Disp: , Rfl:     Allergies:   Allergies   Allergen Reactions    Nkda [No Known Drug Allergy]          Physical Exam:   Ambulatory Vitals  Vitals:    05/09/25 0859   BP: 98/60   Pulse: 80   Resp: 20   SpO2: 97%        Constitutional: Well-developed, well-nourished, good hygiene. Appears stated age.  Respiratory: normal respiratory effort  Skin: Warm, dry, intact. No rashes observed.  Neurologic:   Mental Status: Awake, alert, oriented x 3.   Speech: Fluent with normal prosody.   Memory: Able to recall recent and remote events accurately.    Concentration: Attentive. Able to focus on history and follow multi-step commands.   Fund of Knowledge: Appropriate.   Cranial Nerves:    CN II: PERRL, visual fields full    CN III, IV, VI: EOMI without nystagmus    CN V: Facial sensation intact and symmetric in all 3 trigeminal distributions    CN VII: No facial asymmetry    CN VIII: Hearing intact to finger rub     CN IX and X: Palate elevates symmetrically, gag reflex not tested    CN XI: Symmetric shoulder shrug     CN XII: Tongue midline   Motor: 5/5 in upper and lower extremities bilaterally   Sensory: Intact and equal to light touch diffusely    Coordination: No evidence of past-pointing on finger to nose testing, no dysdiadochokinesia. Heel to shin intact.    DTR's: 2+ throughout     Gait: ambulates steadily without assistive device.  Able to perform tandem gait.    Movements: No resting tremors or abnormal movements observed.   Musculoskeletal:    Strength: as above   Tone: Normal bulk and tone   Joints: No swelling    Studies:      Labs reviewed:      Imaging:     MRI brain without contrast (personally reviewed) 5/3/2025:  IMPRESSION:        1. Age-related cerebral atrophy.     2. Mild periventricular white matter changes consistent with chronic microvascular ischemic gliosis.    MRA head and neck without contrast 5/30/2025:  Study within normal limits    EEG Results:   Routine EEG 5/2/2025:  INTERPRETATION:   Abnormal video EEG recording in the awake state(s):  1) There was a single sharp wave, left temporal region, possibly a benign variant. In the absence of additional similar findings on this routine study, benign variant  is favored. Additional long term EEG monitoring can be pursued if there is clinical concern from epilepsy.   2) Occasional bursts of bilateral occipital theta slowing, approximately 6 Hz. This finding suggests focal cerebral dysfunction of the bilateral occipital region and may be seen in the setting of a structural abnormality, area of prior trauma, postictally, or other considerations. Clinical correlation recommended.         Note: The abnormal findings on this EEG are not diagnostic of epilepsy, as epilepsy remains a clinical diagnosis.  If the clinical suspicion remains high for seizures, a prolonged video EEG recording to capture clinical or subclinical events may be helpful.    8-day cardiac monitoring which resulted 5/1/2025:  Pending final read from physician        Assessment/Plan:   Adrienne Morales is a 70 y.o. woman with a single loss of consciousness episode in February 2025 who is being referred to neurology for further evaluation and consideration whether the event might be seizure related.    The semiology of the event does not sound consistent with seizures.  I suspect this was likely a vasovagal syncope.  There was no postictal period, convulsive activity, extremity jerking or shaking, abnormal facial contortions or twitching, or other focal neurologic symptoms accompanied by this event.  Routine EEG was abnormal and showed bitemporal occipital theta slowing of unclear significance.    Discussed options.  Given her stability I do not believe that more testing would be indicated at this current time, however advised that if she has any other events of concern we can pursue longer-term EEG monitoring to further characterize abnormal finding.        Follow-up will be scheduled in approximately 4-6 months, patient advised that if she is doing well without recurrence of events she can defer the visit.      Yessi Mccall M.D.   Diplomate, Neurology with Special Qualification in Epilepsy, American  Board of Psychiatry and Neurology   of Clinical Neurology, [facility] of Medicine  Level III Epilepsy Center, Department of Neurology at [facility]         During today's encounter we discussed available treatment options and their individual side effect profiles. Total encounter time caring for patient today 54 minutes.

## 2025-05-12 ENCOUNTER — RESULTS FOLLOW-UP (OUTPATIENT)
Dept: MEDICAL GROUP | Facility: MEDICAL CENTER | Age: 70
End: 2025-05-12

## 2025-05-12 PROBLEM — Z87.898 HISTORY OF SYNCOPE: Status: ACTIVE | Noted: 2025-05-12

## 2025-05-15 ENCOUNTER — HOSPITAL ENCOUNTER (OUTPATIENT)
Dept: RADIOLOGY | Facility: MEDICAL CENTER | Age: 70
End: 2025-05-15
Attending: NURSE PRACTITIONER
Payer: COMMERCIAL

## 2025-05-15 ENCOUNTER — RESULTS FOLLOW-UP (OUTPATIENT)
Dept: MEDICAL GROUP | Facility: MEDICAL CENTER | Age: 70
End: 2025-05-15

## 2025-05-15 ENCOUNTER — OFFICE VISIT (OUTPATIENT)
Dept: MEDICAL GROUP | Facility: MEDICAL CENTER | Age: 70
End: 2025-05-15
Payer: COMMERCIAL

## 2025-05-15 ENCOUNTER — HOSPITAL ENCOUNTER (OUTPATIENT)
Facility: MEDICAL CENTER | Age: 70
End: 2025-05-15
Attending: NURSE PRACTITIONER
Payer: COMMERCIAL

## 2025-05-15 VITALS
OXYGEN SATURATION: 93 % | DIASTOLIC BLOOD PRESSURE: 58 MMHG | TEMPERATURE: 98.3 F | BODY MASS INDEX: 27.18 KG/M2 | HEIGHT: 65 IN | HEART RATE: 73 BPM | WEIGHT: 163.14 LBS | SYSTOLIC BLOOD PRESSURE: 106 MMHG

## 2025-05-15 DIAGNOSIS — R51.9 NONINTRACTABLE HEADACHE, UNSPECIFIED CHRONICITY PATTERN, UNSPECIFIED HEADACHE TYPE: ICD-10-CM

## 2025-05-15 DIAGNOSIS — R10.30 LOWER ABDOMINAL PAIN: Primary | ICD-10-CM

## 2025-05-15 DIAGNOSIS — N30.90 CYSTITIS: Primary | ICD-10-CM

## 2025-05-15 DIAGNOSIS — Z90.721 S/P HYSTERECTOMY WITH OOPHORECTOMY: ICD-10-CM

## 2025-05-15 DIAGNOSIS — Z90.710 S/P HYSTERECTOMY WITH OOPHORECTOMY: ICD-10-CM

## 2025-05-15 DIAGNOSIS — R10.30 LOWER ABDOMINAL PAIN: ICD-10-CM

## 2025-05-15 LAB
APPEARANCE UR: CLEAR
BACTERIA #/AREA URNS HPF: ABNORMAL /HPF
BILIRUB UR QL STRIP.AUTO: NEGATIVE
CASTS URNS QL MICRO: ABNORMAL /LPF (ref 0–2)
COLOR UR: YELLOW
EPITHELIAL CELLS 1715: ABNORMAL /HPF (ref 0–5)
GLUCOSE UR STRIP.AUTO-MCNC: NEGATIVE MG/DL
KETONES UR STRIP.AUTO-MCNC: NEGATIVE MG/DL
LEUKOCYTE ESTERASE UR QL STRIP.AUTO: ABNORMAL
MICRO URNS: ABNORMAL
NITRITE UR QL STRIP.AUTO: NEGATIVE
PH UR STRIP.AUTO: 6 [PH] (ref 5–8)
PROT UR QL STRIP: NEGATIVE MG/DL
RBC # URNS HPF: ABNORMAL /HPF
RBC UR QL AUTO: NEGATIVE
SP GR UR STRIP.AUTO: 1.01
UROBILINOGEN UR STRIP.AUTO-MCNC: 1 EU/DL
WBC #/AREA URNS HPF: ABNORMAL /HPF

## 2025-05-15 PROCEDURE — 99214 OFFICE O/P EST MOD 30 MIN: CPT | Performed by: NURSE PRACTITIONER

## 2025-05-15 PROCEDURE — 81001 URINALYSIS AUTO W/SCOPE: CPT

## 2025-05-15 PROCEDURE — 3078F DIAST BP <80 MM HG: CPT | Performed by: NURSE PRACTITIONER

## 2025-05-15 PROCEDURE — 3074F SYST BP LT 130 MM HG: CPT | Performed by: NURSE PRACTITIONER

## 2025-05-15 PROCEDURE — 74022 RADEX COMPL AQT ABD SERIES: CPT

## 2025-05-15 RX ORDER — SUMATRIPTAN SUCCINATE 100 MG/1
50-100 TABLET ORAL
Qty: 10 TABLET | Refills: 0 | Status: SHIPPED | OUTPATIENT
Start: 2025-05-15

## 2025-05-15 RX ORDER — SULFAMETHOXAZOLE AND TRIMETHOPRIM 800; 160 MG/1; MG/1
1 TABLET ORAL 2 TIMES DAILY
Qty: 6 TABLET | Refills: 0 | Status: SHIPPED | OUTPATIENT
Start: 2025-05-15 | End: 2025-05-18

## 2025-05-15 ASSESSMENT — ENCOUNTER SYMPTOMS
ABDOMINAL PAIN: 1
FEVER: 0
CHILLS: 0
PALPITATIONS: 0
HEADACHES: 1

## 2025-05-15 ASSESSMENT — FIBROSIS 4 INDEX: FIB4 SCORE: 1.72

## 2025-05-15 NOTE — PROGRESS NOTES
Patient agreed to use of HEENA: yes  No chief complaint on file.      HISTORY OF PRESENT ILLNESS: Patient is a pleasant 70 y.o. female, established patient who presents today to discuss medical problems as listed below:    Assessment/Plan:    Diagnoses and all orders for this visit:    Lower abdominal pain  -     DX-ABDOMEN COMPLETE WITH AP OR PA CXR; Future  -     URINALYSIS,CULTURE IF INDICATED; Future    Nonintractable headache, unspecified chronicity pattern, unspecified headache type  -     sumatriptan (IMITREX) 100 MG tablet; Take 0.5-1 Tablets by mouth one time as needed for Migraine (Limit use to <10 days/month.  If initial dose ineffective, may repeat the same dose after 1-2 hrs) for up to 1 dose.    S/P hysterectomy with oophorectomy              Assessment & Plan  1. Lower abdominal pain:  2.  Status post hysterectomy with bilateral oophorectomy  - Experiencing lower abdominal pain started Monday night with lingering mild pain, similar episode was noted in February and since resolved  - No associated symptoms such as blood in urine, blood in stool, diarrhea, constipation, fever, or bloating, no symptoms of bladder infection, no dysuria flank pain, abnormal vaginal discharge, urinary frequency urgency.  - Abdominal x-ray and urinalysis with culture will be ordered.  Patient unable to provide urine sample at this time and the order for the lab was issued  -Patient to follow-up with PCP  if pain persists; consider abdominal CT scan if pain persist    2. Headaches:  History of migraines in the past, well-controlled with 100 mg of Imitrex  - Mild headaches recently, not consistent with previous migraines.  This headache initiated from occipital region and now is around bilateral forehead and temporal region  - Aleve helped both headache and abdominal pain.  - Prescription for Imitrex 100 mg to be taken at the onset of a migraine.  - Advised to carry medication at all times and take at the onset of the  headache.  Follow-up as needed    History of Present Illness  The patient presents for evaluation of lower abdominal pain and headaches.    She experienced an episode of syncope in 02/2025, which was preceded by severe lower abdominal pain that disrupted her sleep. Despite undergoing various tests under the care of Dr. Banks, the cause of the syncope remains undetermined. The lower abdominal pain recurred on Monday night, similar to the previous episode, but without subsequent syncope. The pain persists, and she reports feeling unwell on Tuesday. She reports no hematuria, hematochezia, diarrhea, constipation, fever, or bloating. Her bowel movements are regular, occurring at least once daily. She did not have a bowel movement on Monday, but had one on Tuesday. She reports no nausea but notes increased awareness of the pain when driving over bumps or laughing. She has not made any dietary changes. She has a history of total hysterectomy in 2020 and reports that the current pain is more localized to the left side. She has no other history of abdominal surgeries.    She also reports experiencing mild headaches, which she attributes to stress. She has a history of migraines but has not experienced one since 2023. The current headache started yesterday, localized at the base of her neck and radiating to the right side. She does not believe it is a migraine. She took Aleve today, which alleviated both the headache and abdominal pain. However, she woke up with a low-grade frontal headache. She reports no allergies, neck pain, stress, or shoulder pain. She also reports no changes in vision or balance due to the headache. She maintains good hydration and sleep habits, except for Monday night when she was unable to sleep due to the abdominal pain. She has been experiencing hives for almost 2 years and has consulted an allergist, but no cause has been identified. She has been experiencing migraines since she was 22 years old  "and used to take Imitrex 100 mg up to 15 times a month, which provided relief. She had a crown placed on her lower right tooth and experienced 3 migraines thereafter.    She has a history of tachycardia and recently completed a 30-day monitor, which did not reveal any abnormalities.    PAST SURGICAL HISTORY:  - Total hysterectomy in 2020      Allergies, past medical history, past surgical history, family history, social history reviewed and updated.    Review of Systems   Constitutional:  Negative for chills, fever and malaise/fatigue.   Cardiovascular:  Negative for chest pain, palpitations and leg swelling.   Gastrointestinal:  Positive for abdominal pain.   Neurological:  Positive for headaches.        Exam:    /58   Pulse 73   Temp 36.8 °C (98.3 °F) (Temporal)   Ht 1.651 m (5' 5\")   Wt 74 kg (163 lb 2.3 oz)   SpO2 93%   BMI 27.15 kg/m²  Body mass index is 27.15 kg/m².    Physical Exam  Constitutional:       General: She is not in acute distress.     Appearance: She is not ill-appearing.   HENT:      Head: Normocephalic and atraumatic.      Nose: Nose normal.   Eyes:      General:         Right eye: No discharge.         Left eye: No discharge.   Cardiovascular:      Rate and Rhythm: Normal rate.      Pulses: Normal pulses.      Heart sounds: Normal heart sounds. No murmur heard.  Pulmonary:      Effort: Pulmonary effort is normal. No respiratory distress.      Breath sounds: Normal breath sounds. No stridor. No wheezing or rales.   Skin:     Findings: No rash.   Neurological:      General: No focal deficit present.      Mental Status: She is alert. Mental status is at baseline.   Psychiatric:         Mood and Affect: Mood normal.         Behavior: Behavior normal.          Results       Discussed with patient possible alternative diagnoses, patient is to take all medications as prescribed.      If symptoms persist FU w/PCP, if symptoms worsen go to emergency room.      If experiencing any side " effects from prescribed medications report to the office immediately or go to emergency room.     Reviewed indication, dosage, usage and potential adverse effects of prescribed medications.      Reviewed risks and benefits of treatment plan. Patient verbalizes understanding of all instruction and verbally agrees to plan.     Discussed plan with the patient, and patient agrees to the above.      I personally reviewed prior external notes and test results pertinent to today's visit.      No follow-ups on file. 4 wks with PCP for a f/u

## 2025-06-15 PROBLEM — R51.9 NONINTRACTABLE HEADACHE: Status: RESOLVED | Noted: 2025-05-15 | Resolved: 2025-06-15

## 2025-06-15 PROBLEM — R10.30 LOWER ABDOMINAL PAIN: Status: RESOLVED | Noted: 2025-05-15 | Resolved: 2025-06-15

## 2025-06-16 ENCOUNTER — OFFICE VISIT (OUTPATIENT)
Dept: MEDICAL GROUP | Facility: MEDICAL CENTER | Age: 70
End: 2025-06-16
Payer: COMMERCIAL

## 2025-06-16 ENCOUNTER — TELEPHONE (OUTPATIENT)
Dept: MEDICAL GROUP | Facility: MEDICAL CENTER | Age: 70
End: 2025-06-16

## 2025-06-16 VITALS
DIASTOLIC BLOOD PRESSURE: 58 MMHG | HEIGHT: 64 IN | WEIGHT: 160.9 LBS | HEART RATE: 81 BPM | BODY MASS INDEX: 27.47 KG/M2 | OXYGEN SATURATION: 98 % | SYSTOLIC BLOOD PRESSURE: 96 MMHG | TEMPERATURE: 97.8 F

## 2025-06-16 DIAGNOSIS — Z87.898 HISTORY OF SYNCOPE: Primary | ICD-10-CM

## 2025-06-16 DIAGNOSIS — K76.0 HEPATIC STEATOSIS: ICD-10-CM

## 2025-06-16 DIAGNOSIS — T75.3XXS MOTION SICKNESS, SEQUELA: ICD-10-CM

## 2025-06-16 DIAGNOSIS — R94.5 ABNORMAL LIVER FUNCTION: ICD-10-CM

## 2025-06-16 DIAGNOSIS — E78.5 DYSLIPIDEMIA: ICD-10-CM

## 2025-06-16 PROBLEM — T75.3XXA TRAVEL SICKNESS: Status: ACTIVE | Noted: 2025-06-16

## 2025-06-16 PROCEDURE — 99214 OFFICE O/P EST MOD 30 MIN: CPT | Performed by: INTERNAL MEDICINE

## 2025-06-16 PROCEDURE — 3078F DIAST BP <80 MM HG: CPT | Performed by: INTERNAL MEDICINE

## 2025-06-16 PROCEDURE — 3074F SYST BP LT 130 MM HG: CPT | Performed by: INTERNAL MEDICINE

## 2025-06-16 RX ORDER — SCOPOLAMINE 1 MG/3D
1 PATCH, EXTENDED RELEASE TRANSDERMAL
Qty: 4 PATCH | Refills: 3 | Status: SHIPPED | OUTPATIENT
Start: 2025-06-16

## 2025-06-16 ASSESSMENT — FIBROSIS 4 INDEX: FIB4 SCORE: 1.72

## 2025-06-16 NOTE — PROGRESS NOTES
Subjective     Adrienne Morales is a 70 y.o. female who presents with Follow-Up (Talk about liver issues that you are aware of /Sea sickness meds that you would recommend )            HPI    Patient here for follow-up labs, 4/23/25 chol 204,trig 139,hdl 64,ldl 116,lipoprotein a 107,apo b 87 and fairly recent CT calcium score done as well 1/29/25 CT calcium score = 0.0, currently not on statin therapy and 4/23/25 fibrosure fibrosis score 0.18 (F0),steatosis score 0.76 (S2-S3),Paez score 0.68 (N2),AST 59,ALT 36, [facility], since the FibroSure testing, she has cut back on alcohol maybe has had 1 drink since the testing.  She has an ultrasound elastography scheduled for [facility] upcoming and was told not to drink water or have any type of food for 8 hours prior to the test unfortunately that test is scheduled in the afternoon.  History of syncopal episode earlier this year, workup included MRI brain, MRA head and neck done in May, echo, event monitor, and EEG, seen by neurology last month, EEG occipital theta slowing unclear significance but suspect vasovagal episode, not consistent with seizure, told to follow-up for 6 months, patient has not had recurrent episodes.  She tries to keep well-hydrated with good water intake.  Seen by another provider last month for lower abdominal discomfort, similar to what she had prior to her syncopal episode earlier this year, urinalysis was sent on May 15, 3-5 WBCs, no RBCs, no bacteria, trace esterase, was given Bactrim x 3 days which she completed.  No recurrent symptoms.  Does have an upcoming appointment with her gynecologist, no further lower abdominal cramping symptoms, no diarrhea or change in bowel habits, no dysuria, no hematuria.  Also still with pruritus followed by dr.reyes, allergy on Zyrtec 4 tablets/day which does not cause sedation, still with pruritus   Travel sickness, is inquiring about treatment for that  Medications, allergies, medical history,  surgical history, social history, family history  reviewed and updated          Current Medications[1]              thyroid nodule  2/12/22 ultrasound soft tissue neck at [facility] left upper pole nodule 0.8 x 0.7 x 0.6 cm TIRADS 2/12/22 ultrasound soft tissue neck at [facility] left upper pole nodule 0.8 x 0.7 x 0.6 cm TIRADS 5 suspicious   4/26/22  endocrine note labs ordered, repeat ultrasound thyroid in 6 months to monitor size and progression  9/27/22 tsh 2.2,free t4 1.23,free t3 3.1,TPO 11 per  endocrine  9/28/22 ultrasound thyroid done at [facility] per  endocrinology left upper pole nodule 0.8 x 0.8 x 0.6 cm tirad 7, no change compared to previous  10/3/22 tsh 2.2,free t4 1.2,free t3 3.1,TPO 11 (0-34) per  endocrine  10/7/22  endocrine note repeat thyroid ultrasound one year  7/21/23 tsh 1.4  10/10/23 bea at [facility] endocrinology note thyroid labs ordered and ultrasound thyroid  11/21/23 [facility] note repeat thyroid ultrasound looks good, recheck 1 year   2/7/24 tsh 1.8,free t4 1.2,free t3 3.5   2/29/24 [facility] note repeat ultrasound thyroid 1 year, start estradiol patch 0.0375 mg/24 hours every 3 days  6/24/24 [facility] note repeat us thyroid ordered for october  10/22/24 [facility] endocrine note  4/23/25 tsh 2.1,free t4 1.3,free t3 3.9 labs done at [facility]     Sleep disordered breathing  12/12/19 OPO through preferred see if perhaps she has sleep apnea contributing to migraine headaches  1/6/20 OPO through preferred time less than 88% 23 minutes, low saturation 76%, basal saturation 92%     skin lesion  [facility]   [facility]  1/26/23 teressa beyer skin cancer dermatology note hand dermatitis start tacrolimus 0.1% ointment bid  9/11/23 teressa beyer dermatology note   11/27/23 teressa beyer dermatology note dermatitis back start triamcinolone 0.1% cream  bid, zyrtec  1/17/24 teressa  kayleen skin cancer dermatology note dermatitis back start triamcinolone 0.1% cream bid, zyrtec   9/11/24 teressa beyer np skin cancer dermatology note      Preventative health  9/12/14 tdap  12/31/19 hep c ab negative  1/15/20 cologuard negative  6/22/23 dr.marlow matos gynecology note   1/9/24 dexa at [facility] LS-0.3,hip-2.0  2/2/24 colon per [facility] moderate diverticulosis, single adenoma repeat 7 years  5/21/24 shingrix second  11/26/24 mammogram at [facility]   1/13/25 declines flu,tdap, prevnar  1/15/25  [facility] gyn note   [facility]     post menopausal  11/21/23 [facility] note start estradiol patch 0.025 mg weekly every 3 days and testosterone powder 5 mg/gm apply 0.5 gm topical daily  2/7/24 testosterone 41,SHBG 35,LH 28,FSH 46,estradiol 28  2/29/24 [facility] note repeat ultrasound thyroid 1 year, start estradiol patch 0.0375 mg/24 hours every 3 days  6/24/24 [facility] note start testosterone powder 5 mg/gm apply 0.5 gm daily and continue estradiol patch 0.0375 mg/24 hours twice weekly  10/3/24 LH 17,FSH 30,estradiol 42.7,SBHG 34.7 per [facility] start estradiol patch 0.375 mg twice weekly  10/22/24 [facility] note  1/15/25  [facility] gyn note recommend not use estradiol since only using for hair loss     Osteopenia  12/10/12 dexa LS -0.9, hip -1.2 per gyn  9/16/14 vit d 28 start 2000 units  10/6/15 dexa LS 0.0,hip -1.8 ;FRAX 9% major,1.0% hip  10/14/15 vit d 32   7/22/16 start 2000 units   10/19/16 vit d 35 on 2000 units done at [facility]  10/20/17 vit d 37 done at [facility]  11/10/17 dexa LS-0.5,hip-2.0;FRAX 10% major,1.5% hip done at [facility]  11/27/18 vit d 42  12/31/19 dexa at [facility] LS-0.5,hip-1.9 done at [facility],FRAX 10.8% major,2.5% hip  1/11/20 vit d 43  1/6/21 vit d 44  1/5/22 dexa at [facility] LS-0.4,hip-1.8  1/13/22 vit d 44  9/27/22 vit d 44 per  endocrine  7/21/23 PTH 42,celiac panel negative  1/9/24 dexa at [facility] LS-0.3,hip-2.0  6[facility]18/[facility]  4/23/25 vit d 41 labs [facility]     low back pain   12/20/24 [facility] note low back pain   12/20/24 x-ray lumbar spine flexion-extension views, questionable chronic changes mid thoracic spine T8-T10, no acute fractures     Insomnia uses Ambien for travel     Impaired glucose metabolism  12/31/19 A1c 5.8%  1/6/21 A1c 5.6%  1/13/22 A1c 5.7%  3/8/23 A1c 5.9%  8/18/24 A1c 5.6%  4/23/25 A1c 5.8% labs done at [facility]    history syncope  2/28/25 syncopal episode, 30-day monitor, echo, MRI brain, MRA head and neck ordered, EEG, neurology referral  4/7/25 EEG referral made march 5, it is still sitting there, we will call imaging to have them work on this right away she has an appointment with neurology in a month  5/3/25 MRI brain without age-related atrophy, mild periventricular white matter changes consistent with chronic microvascular gliosis  5/3/25 MR-MRA head without, normal [place]  5/3/25 MR-MRI neck without, normal MR angiogram carotid arteries and vertebrobasilar system  5/5/25 echo normal LV function, EF 60 to 65%, no wall motion abnormalities, normal diastolic function, trace MR  4/2/25 to 5/1/25 mcot event monitor 27 days, 23 hours, 31 minutes, average heart rate 90, low heart rate 57, high heart rate 146, 11 events transmitted, 2 patient triggered sinus rhythm, 1 heart block first-degree slowest rate 83 bpm, no PACs, PVC burden less than 1%, no atrial fibrillation  5/2/25 EEG single sharp wave left temporal region likely benign variant, occasional bursts of bilateral occipital theta slowing suggesting cerebral dysfunction bilateral occipital region  5/9/25  neurology consultation note episode loss of consciousness in february reviewed EEG when occipital theta slowing unclear significance, suspect vasovagal episode was not consistent with seizure type activity with no postictal, no shaking or jerking movements, twitching, or other focal neurologic symptoms; recommend  observation for recurrent symptoms follow-up 4 to 6 months     History of perforated tympanic membrane     History migraine tried Inderal previously but caused low blood pressure  Tried inderal in her 20s, but caused low bp  10/24/17 more migraine recently and declines medication   12/11/18 uses imitrex as needed  12/14/20 on imitrex as needed  5/3/25 MRI brain without age-related atrophy, mild periventricular white matter changes consistent with chronic microvascular gliosis     History endometrial cancer  3/10 JACKLYN BSO for stage I endometrial cancer   2013 gyn exam  sees twice per year  9/14 sees  gyn  3/7/17 pap per gyn   3/18 saw  gyn   6/22/23 dr.marlow matos gynecology note   11/21/23 [facility] note start estradiol patch 0.025 mg weekly every 3 days and testosterone powder 5 mg/gm apply 0.5 gm topical daily     history covid  1/17/22 patient MyChart message positive home test, will order PCR  1/18/22 covid positive rapid test     Family history cardiovascular disease  Father CABG and sister heart disease  11/20/13 echo normal LV 60%  12/10/13 CT calcium score 0     Dyslipidemia  11/20/13 echo normal LV 60%  11/14/13 chol 191,trig 113,hdl 61,ldl 107  12/10/13 CT calcium score 0  9/16/14 chol 206,trig 91,hdl 74,ldl 114  10/14/15 chol 215,trig 83,hdl 81,ldl 117  10/19/16 chol 189,trig 127,hdl 61,ldl 103 done [facility]  10/24/17 chol 205,trig 83,hdl 66,ldl 121  11/27/18 chol 204,trig 121,hdl 62,ldl 121  12/31/19 chol 204,trig 130,hdl 65,ldl 113  1/6/21 chol 206,trig 154,hdl 61,ldl 118; 10 year risk 5.75%  1/13/22 chol 213,trig 158,hdl 57,ldl 128; 10 year risk 4.5%  3/8/23 chol 218,trig 110,hdl 67,ldl 132; 10 year risk 4.9%  8/18/24 chol 202,trig 125,hdl 58,ldl 122; 10 year risk 5.7%  1/29/25 CT calcium score = 0.0, incidental 2 mm benign calcified granuloma anterior medial segment left lower lobe done at [facility]  2/28/25 CENTENO 10 year risk 1.4% based on last year's cholesterol  panel  4/23/25 chol 204,trig 139,hdl 64,ldl 116,lipoprotein a 107,apo b 87 [facility]     adenoma  2/2/24 colon per [facility] moderate diverticulosis, single adenoma repeat 7 years     abn liver enzymes  9/15/14 AST 22,ALT 35,alk phos 118,bili 0.3  11/27/18 AST 42,ALT 50,alk phos 121,bili 0.3  12/31/19 AST 56,ALT 30,alk phos 122,bili 0.4  1/8/20 ultrasound abdomen done at [facility], diffusely echogenic liver likely representing hepatic steatosis, no focal mass, common bile duct within normal limits 0.3 cm, pancreas unremarkable, gallbladder and spleen unremarkable  1/11/20 alk phos 137, acute hepatitis panel negative, iron 108,%sat 30, ferritin 75, SPEP negative, AMA negative, smooth muscle antibody negative  5/22/20 AST 35,ALT 40,GGT 69, alkaline phosphatase 115, liver fraction 58% (18-85%), bone fractions 42% (14-68%), bilirubin 0.2  1/6/21 alk phos 142,AST 39,ALT 73,bili 0.3  4/26/21 AST 66, ALT 89, GGT 95, fibrosis score 0.23 (stage F0-F1), steatosis score 0.65 (S2 moderate steatosis), BANKS 0.25 (N0)  1/13/22 alk phos 133(),bili 0.3,AST 24,ALT 40,GGT 70   3/8/23 alk phos 171,AST 35,ALT 62,bili 0.4  5/19/23 ultrasound right upper quadrant done at [facility], liver diffusely echogenic likely representing hepatic steatosis, no focal mass, normal portal vein blood flow, gallbladder negative, common bile duct 0.2 cm, pancreas unremarkable, normal spleen size  7/21/23 alk phos 123 (),liver fraction 61% (18-85%), bone fraction 39% (14-68%),AMA<20,AST 21,ALT 37,bili 0.5,,celiac panel negative,PTH 42,tsh 1.4  8/18/24 AST 34,ALT 44,alk phos 96,bili 0.4  4/23/25 AST 28,ALT 48,alk phos 117 (),bili 0.3 done at [facility]  4/23/25 fibrosure fibrosis score 0.18 (F0),steatosis score 0.76 (S2-S3),Banks score 0.68 (N2),AST 59,ALT 36, labs done at [facility]               Problem List[2]               Patient Care Team:  Feliciano Olson M.D. as PCP - General (Internal Medicine)      ROS           Objective     BP  "96/58   Pulse 81   Temp 36.6 °C (97.8 °F) (Temporal)   Ht 1.632 m (5' 4.25\")   Wt 73 kg (160 lb 14.4 oz)   SpO2 98%   BMI 27.40 kg/m²      Physical Exam  Vitals and nursing note reviewed.   Constitutional:       Appearance: Normal appearance.   HENT:      Head: Normocephalic and atraumatic.      Right Ear: External ear normal.      Left Ear: External ear normal.      Nose: Nose normal.   Eyes:      Conjunctiva/sclera: Conjunctivae normal.   Cardiovascular:      Rate and Rhythm: Normal rate and regular rhythm.      Heart sounds: Normal heart sounds.   Pulmonary:      Effort: No respiratory distress.      Breath sounds: Normal breath sounds.   Abdominal:      General: There is no distension.   Musculoskeletal:      Cervical back: Neck supple.   Skin:     Coloration: Skin is not jaundiced.   Neurological:      General: No focal deficit present.      Mental Status: She is alert.   Psychiatric:         Mood and Affect: Mood normal.         Behavior: Behavior normal.             Assessment & Plan         Assessment  #1 Dyslipidemia, reviewed most recent lipid panel from April as well as CT calcium score in January, 10-year Marie risk 1.4%, standard ASCVD 10-year risk is 5%, continues on a good nutrition and exercise program    #2 abnormal FibroSure test 4/23/25 fibrosure fibrosis score 0.18 (F0),steatosis score 0.76 (S2-S3),Paez score 0.68 (N2),AST 59,ALT 36,, standard us liver 2 years ago consistent with hepatic steatosis, has cut back on alcohol intake    #3 recent lower abdominal pain, symptoms resolved, urinalysis looks like it was not consistent with urinary tract infection but she did complete a 3-day course of Bactrim    #4 history of syncope, no recurrent symptoms workup negative, has seen neurology no evidence of seizure disorder    #5 travel sickness    #6 pruritus followed by allergy we still do not have any records currently on Zyrtec 4 per day with no drowsiness           Plan  #1 reviewed labs " with her from April 23 as well as CT calcium score from January 29, calculated standard atherosclerotic cardiovascular risk disease 5.4%, and Marie 10-year risk 1.4% incorporating her recent lipid panel, blood pressure, CT calcium and demographics, spent time reviewing each of these calculations with her and discussing the risks and benefits of potential cholesterol medication as far as being able to decrease her annual cardiac risk for an event, she declines medications which I think is reasonable given her low 10-year risk, time spent reviewing her lipid panel, CT calcium score, calculating 10-year Marie risk as well as standard ASCVD risk greater than 50% of her appointment time    #2 continue with a good nutrition and exercise program    #3 has upcoming ultrasound elastography from [facility]    #4 Old records dr.reyes allergy immunology on zyrtec 4 per day     #5 scopolamine patch sent to pharmacy please check with allergy immunology for feedback on how much Zyrtec she can take while on the scopolamine patch on her trip, as excessive antihistamine intake may cause side effects of drowsiness, sedation    #6 continue to limit alcohol intake for now pending results of the ultrasound elastography but she can have the occasional alcoholic beverage    #7 I will notify her with the ultrasound result, recommend she follow the instructions with no water intake or food 8 hours prior to the test    #8 follow-up gynecology       [1]   Current Outpatient Medications   Medication Sig Dispense Refill    sumatriptan (IMITREX) 100 MG tablet Take 0.5-1 Tablets by mouth one time as needed for Migraine (Limit use to <10 days/month.  If initial dose ineffective, may repeat the same dose after 1-2 hrs) for up to 1 dose. 10 Tablet 0    cetirizine (ZYRTEC) 10 MG Tab Take 20 mg by mouth 2 times a day.      fluocinonide (LIDEX) 0.05 % Ointment       OPZELURA 1.5 % Cream       Calcium Carbonate Antacid 1000 MG Chew Tab Chew.      Propylene Glycol  (SYSTANE BALANCE OP) Administer  into affected eye(s).      acyclovir (ZOVIRAX) 5 % Ointment APPLY TO THE AFFECTED AREA EVERY 2 HOURS AS DIRECTED      Multiple Vitamins-Minerals (PRESERVISION AREDS 2 PO) PreserVision AREDS 2      tacrolimus (PROTOPIC) 0.1 % Ointment       Cholecalciferol (VITAMIN D) 2000 UNIT Tab Take  by mouth every day.      Naproxen Sodium (ALEVE PO) Take  by mouth.       No current facility-administered medications for this visit.   [2]   Patient Active Problem List  Diagnosis    Preventative health care    Insomnia    History of endometrial cancer    History of migraine    Osteopenia    History of perforated tympanic membrane    Family history of cardiovascular disease    Dyslipidemia    Abnormal liver function    Dermatochalasis    Impaired glucose metabolism    Sleep disorder breathing    History of shingles    History of COVID-19    Thyroid nodule    Skin lesion    Post-menopausal    Patient has active power of  for health care    Adenoma of colon    Low back pain    History of syncope    S/P hysterectomy with oophorectomy

## 2025-06-16 NOTE — LETTER
[facility]  Feliciano Olson M.D.  [address]  [address]  [place] [address]  Fax: [phone]   Authorization for Release/Disclosure of   Protected Health Information   Name: NICOLE HAJI : 1955 SSN: [id number]   Address: [address] [place] [address]  [place] [address] Phone:    There are no phone numbers on file.   I authorize the entity listed below to release/disclose the PHI below to:   [facility]/Feliciano Olson M.D. and Feliciano Olson M.D.   Provider or Entity Name:  Dr. Reyes    Address   City, State, Zip   Phone:      Fax:  [phone]    Reason for request: continuity of care   Information to be released:    [  ] LAST COLONOSCOPY,  including any PATH REPORT and follow-up  [  ] LAST FIT/COLOGUARD RESULT [  ] LAST DEXA  [  ] LAST MAMMOGRAM  [  ] LAST PAP  [  ] LAST LABS [  ] RETINA EXAM REPORT  [  ] IMMUNIZATION RECORDS  [  ] Release all info      [  ] Check here and initial the line next to each item to release ALL health information INCLUDING  _____ Care and treatment for drug and / or alcohol abuse  _____ HIV testing, infection status, or AIDS  _____ Genetic Testing    DATES OF SERVICE OR TIME PERIOD TO BE DISCLOSED: _____________  I understand and acknowledge that:  * This Authorization may be revoked at any time by you in writing, except if your health information has already been used or disclosed.  * Your health information that will be used or disclosed as a result of you signing this authorization could be re-disclosed by the recipient. If this occurs, your re-disclosed health information may no longer be protected by State or Federal laws.  * You may refuse to sign this Authorization. Your refusal will not affect your ability to obtain treatment.  * This Authorization becomes effective upon signing and will  on (date) __________.      If no date is indicated, this Authorization will  one (1) year from the signature date.    Name: Nicole Haji  Signature: Date:    6/17/2025     PLEASE FAX REQUESTED RECORDS BACK TO: [phone]

## 2025-06-20 PROBLEM — L29.9 PRURITUS: Status: ACTIVE | Noted: 2025-06-20

## 2025-07-08 ENCOUNTER — APPOINTMENT (OUTPATIENT)
Dept: URBAN - METROPOLITAN AREA CLINIC 35 | Facility: CLINIC | Age: 70
Setting detail: DERMATOLOGY
End: 2025-07-08

## 2025-07-08 DIAGNOSIS — L30.9 DERMATITIS, UNSPECIFIED: ICD-10-CM

## 2025-07-08 DIAGNOSIS — L50.1 IDIOPATHIC URTICARIA: ICD-10-CM

## 2025-07-08 PROCEDURE — ? COUNSELING

## 2025-07-08 PROCEDURE — ? MEDICATION COUNSELING

## 2025-07-08 PROCEDURE — ? TREATMENT REGIMEN

## 2025-07-08 PROCEDURE — ? PRESCRIPTION MEDICATION MANAGEMENT

## 2025-07-08 ASSESSMENT — LOCATION DETAILED DESCRIPTION DERM
LOCATION DETAILED: RIGHT MID-UPPER BACK
LOCATION DETAILED: LEFT INFERIOR UPPER BACK
LOCATION DETAILED: RIGHT RIB CAGE

## 2025-07-08 ASSESSMENT — LOCATION SIMPLE DESCRIPTION DERM
LOCATION SIMPLE: ABDOMEN
LOCATION SIMPLE: RIGHT UPPER BACK
LOCATION SIMPLE: LEFT UPPER BACK

## 2025-07-08 ASSESSMENT — LOCATION ZONE DERM: LOCATION ZONE: TRUNK

## 2025-07-08 ASSESSMENT — ITCH NUMERIC RATING SCALE: HOW SEVERE IS YOUR ITCHING?: 4

## 2025-07-08 ASSESSMENT — BSA RASH: BSA RASH: 30

## 2025-07-08 NOTE — PROCEDURE: TREATMENT REGIMEN
Plan: 7/8/25: \\n\\n9/11/24 pt states her PCP has sent a referral for Dr. delacruz but she has not heard from them yet. Her PCP did lab work as well
Continue Regimen: - triamcinolone acetonide 0.1 % topical cream. Apply on affected skin BID as needed for 2 weeks alternating with 1 week off., repeat as needed. Never on face, underarms or groin. Taper to once a day, then every other day\\n\\n- Zyrtec 1-2 pills AM & PM
Detail Level: Zone
Plan: -7/8/25: pt did Zyrtec tx rounds, and then stopped taking medication and saw no correlation with itch. Discussed [place] kenalog injections, if pt itch scale becomes out of hand. Pt had success with opzelura on hands. Dr. Escamilla (pcp) is watching pt’s liver. Discussed bx, pt would like to wait until she has chemical patch testing. Lisa recommends Vanicream/ Cerave. Lisa believes pt should go on an idiopathic route. Pt has enough opzelura. \\n\\n-9/11/24 pt states her PCP has sent a referral for Dr. delacruz but she has not heard from them yet. Her PCP did lab work as well.

## 2025-07-08 NOTE — PROCEDURE: PRESCRIPTION MEDICATION MANAGEMENT
Detail Level: Detailed
Continue Regimen: Triamcinolone
Plan: Use as needed to affected areas on torso
Render In Strict Bullet Format?: Yes

## 2025-08-22 ENCOUNTER — TELEPHONE (OUTPATIENT)
Dept: MEDICAL GROUP | Facility: MEDICAL CENTER | Age: 70
End: 2025-08-22
Payer: COMMERCIAL

## 2025-08-22 ENCOUNTER — HOSPITAL ENCOUNTER (OUTPATIENT)
Dept: RADIOLOGY | Facility: MEDICAL CENTER | Age: 70
End: 2025-08-22
Payer: COMMERCIAL

## 2025-08-26 ENCOUNTER — APPOINTMENT (OUTPATIENT)
Dept: URBAN - METROPOLITAN AREA CLINIC 35 | Facility: CLINIC | Age: 70
Setting detail: DERMATOLOGY
End: 2025-08-26

## 2025-08-26 DIAGNOSIS — L82.1 OTHER SEBORRHEIC KERATOSIS: ICD-10-CM

## 2025-08-26 PROCEDURE — ? COUNSELING

## 2025-08-26 ASSESSMENT — LOCATION ZONE DERM: LOCATION ZONE: SCALP

## 2025-08-26 ASSESSMENT — LOCATION SIMPLE DESCRIPTION DERM: LOCATION SIMPLE: ANTERIOR SCALP

## 2025-08-26 ASSESSMENT — LOCATION DETAILED DESCRIPTION DERM: LOCATION DETAILED: MID-FRONTAL SCALP

## 2025-10-17 ENCOUNTER — APPOINTMENT (OUTPATIENT)
Dept: NEUROLOGY | Facility: MEDICAL CENTER | Age: 70
End: 2025-10-17
Attending: STUDENT IN AN ORGANIZED HEALTH CARE EDUCATION/TRAINING PROGRAM
Payer: COMMERCIAL